# Patient Record
Sex: MALE | Race: WHITE | Employment: OTHER | URBAN - NONMETROPOLITAN AREA
[De-identification: names, ages, dates, MRNs, and addresses within clinical notes are randomized per-mention and may not be internally consistent; named-entity substitution may affect disease eponyms.]

---

## 2019-09-12 ENCOUNTER — APPOINTMENT (OUTPATIENT)
Dept: CT IMAGING | Age: 49
End: 2019-09-12
Payer: COMMERCIAL

## 2019-09-12 ENCOUNTER — APPOINTMENT (OUTPATIENT)
Dept: GENERAL RADIOLOGY | Age: 49
End: 2019-09-12
Payer: COMMERCIAL

## 2019-09-12 ENCOUNTER — HOSPITAL ENCOUNTER (EMERGENCY)
Age: 49
Discharge: ANOTHER HOSPITAL | End: 2019-09-13
Attending: EMERGENCY MEDICINE
Payer: COMMERCIAL

## 2019-09-12 DIAGNOSIS — K92.2 GASTROINTESTINAL HEMORRHAGE, UNSPECIFIED GASTROINTESTINAL HEMORRHAGE TYPE: Primary | ICD-10-CM

## 2019-09-12 LAB
-: ABNORMAL
ACETAMINOPHEN LEVEL: <5 UG/ML (ref 10–30)
ALBUMIN SERPL-MCNC: 4 G/DL (ref 3.5–5.2)
ALBUMIN/GLOBULIN RATIO: 1.2 (ref 1–2.5)
ALP BLD-CCNC: 89 U/L (ref 40–129)
ALT SERPL-CCNC: 122 U/L (ref 5–41)
AMORPHOUS: ABNORMAL
AMPHETAMINE SCREEN URINE: NEGATIVE
ANION GAP SERPL CALCULATED.3IONS-SCNC: 18 MMOL/L (ref 9–17)
AST SERPL-CCNC: 187 U/L
BACTERIA: ABNORMAL
BARBITURATE SCREEN URINE: NEGATIVE
BENZODIAZEPINE SCREEN, URINE: NEGATIVE
BILIRUB SERPL-MCNC: 1.48 MG/DL (ref 0.3–1.2)
BILIRUBIN URINE: ABNORMAL
BUN BLDV-MCNC: 7 MG/DL (ref 6–20)
BUN/CREAT BLD: 13 (ref 9–20)
BUPRENORPHINE URINE: NEGATIVE
CALCIUM SERPL-MCNC: 9.6 MG/DL (ref 8.6–10.4)
CANNABINOID SCREEN URINE: POSITIVE
CASTS UA: ABNORMAL /LPF
CHLORIDE BLD-SCNC: 87 MMOL/L (ref 98–107)
CO2: 27 MMOL/L (ref 20–31)
COCAINE METABOLITE, URINE: NEGATIVE
COLOR: YELLOW
COMMENT UA: ABNORMAL
CREAT SERPL-MCNC: 0.52 MG/DL (ref 0.7–1.2)
CRYSTALS, UA: ABNORMAL /HPF
DIRECT EXAM: POSITIVE
EPITHELIAL CELLS UA: ABNORMAL /HPF (ref 0–5)
ETHANOL PERCENT: <0.01 %
ETHANOL: <10 MG/DL
GFR AFRICAN AMERICAN: >60 ML/MIN
GFR NON-AFRICAN AMERICAN: >60 ML/MIN
GFR SERPL CREATININE-BSD FRML MDRD: ABNORMAL ML/MIN/{1.73_M2}
GFR SERPL CREATININE-BSD FRML MDRD: ABNORMAL ML/MIN/{1.73_M2}
GLUCOSE BLD-MCNC: 75 MG/DL (ref 70–99)
GLUCOSE URINE: ABNORMAL
HCT VFR BLD CALC: 39 % (ref 40.7–50.3)
HEMOGLOBIN: 13.6 G/DL (ref 13–17)
INR BLD: 1 (ref 0.9–1.2)
KETONES, URINE: ABNORMAL
LEUKOCYTE ESTERASE, URINE: NEGATIVE
Lab: ABNORMAL
MAGNESIUM: 1.9 MG/DL (ref 1.6–2.6)
MCH RBC QN AUTO: 34.1 PG (ref 25.2–33.5)
MCHC RBC AUTO-ENTMCNC: 34.9 G/DL (ref 28.4–34.8)
MCV RBC AUTO: 97.7 FL (ref 82.6–102.9)
MDMA URINE: ABNORMAL
METHADONE SCREEN, URINE: NEGATIVE
METHAMPHETAMINE, URINE: NEGATIVE
MUCUS: ABNORMAL
NITRITE, URINE: NEGATIVE
NRBC AUTOMATED: 0 PER 100 WBC
OPIATES, URINE: NEGATIVE
OTHER OBSERVATIONS UA: ABNORMAL
OXYCODONE SCREEN URINE: NEGATIVE
PDW BLD-RTO: 12.6 % (ref 11.8–14.4)
PH UA: 7 (ref 5–9)
PHENCYCLIDINE, URINE: NEGATIVE
PLATELET # BLD: ABNORMAL K/UL (ref 138–453)
PLATELET, FLUORESCENCE: 83 K/UL (ref 138–453)
PLATELET, IMMATURE FRACTION: 10.7 % (ref 1.1–10.3)
PMV BLD AUTO: ABNORMAL FL (ref 8.1–13.5)
POTASSIUM SERPL-SCNC: 3.6 MMOL/L (ref 3.7–5.3)
PROPOXYPHENE, URINE: NEGATIVE
PROTEIN UA: ABNORMAL
PROTHROMBIN TIME: 10 SEC (ref 9.7–12.2)
RBC # BLD: 3.99 M/UL (ref 4.21–5.77)
RBC UA: ABNORMAL /HPF (ref 0–2)
RENAL EPITHELIAL, UA: ABNORMAL /HPF
SALICYLATE LEVEL: <1 MG/DL (ref 3–10)
SODIUM BLD-SCNC: 132 MMOL/L (ref 135–144)
SPECIFIC GRAVITY UA: 1.01 (ref 1.01–1.02)
SPECIMEN DESCRIPTION: ABNORMAL
TEST INFORMATION: ABNORMAL
TOTAL PROTEIN: 7.4 G/DL (ref 6.4–8.3)
TRICHOMONAS: ABNORMAL
TRICYCLIC ANTIDEPRESSANTS, UR: NEGATIVE
TROPONIN INTERP: NORMAL
TROPONIN T: <0.03 NG/ML
TROPONIN, HIGH SENSITIVITY: NORMAL NG/L (ref 0–22)
TURBIDITY: CLEAR
URINE HGB: ABNORMAL
UROBILINOGEN, URINE: ABNORMAL
WBC # BLD: 5.6 K/UL (ref 3.5–11.3)
WBC UA: ABNORMAL /HPF (ref 0–5)
YEAST: ABNORMAL

## 2019-09-12 PROCEDURE — G0480 DRUG TEST DEF 1-7 CLASSES: HCPCS

## 2019-09-12 PROCEDURE — 82271 OCCULT BLOOD OTHER SOURCES: CPT

## 2019-09-12 PROCEDURE — 99285 EMERGENCY DEPT VISIT HI MDM: CPT

## 2019-09-12 PROCEDURE — C9113 INJ PANTOPRAZOLE SODIUM, VIA: HCPCS | Performed by: PHYSICIAN ASSISTANT

## 2019-09-12 PROCEDURE — 70450 CT HEAD/BRAIN W/O DYE: CPT

## 2019-09-12 PROCEDURE — 71046 X-RAY EXAM CHEST 2 VIEWS: CPT

## 2019-09-12 PROCEDURE — 83735 ASSAY OF MAGNESIUM: CPT

## 2019-09-12 PROCEDURE — 81001 URINALYSIS AUTO W/SCOPE: CPT

## 2019-09-12 PROCEDURE — 85610 PROTHROMBIN TIME: CPT

## 2019-09-12 PROCEDURE — 6360000002 HC RX W HCPCS: Performed by: PHYSICIAN ASSISTANT

## 2019-09-12 PROCEDURE — 2580000003 HC RX 258: Performed by: PHYSICIAN ASSISTANT

## 2019-09-12 PROCEDURE — 84484 ASSAY OF TROPONIN QUANT: CPT

## 2019-09-12 PROCEDURE — 85055 RETICULATED PLATELET ASSAY: CPT

## 2019-09-12 PROCEDURE — 85027 COMPLETE CBC AUTOMATED: CPT

## 2019-09-12 PROCEDURE — 72125 CT NECK SPINE W/O DYE: CPT

## 2019-09-12 PROCEDURE — 74177 CT ABD & PELVIS W/CONTRAST: CPT

## 2019-09-12 PROCEDURE — 6360000004 HC RX CONTRAST MEDICATION: Performed by: PHYSICIAN ASSISTANT

## 2019-09-12 PROCEDURE — 36415 COLL VENOUS BLD VENIPUNCTURE: CPT

## 2019-09-12 PROCEDURE — 80307 DRUG TEST PRSMV CHEM ANLYZR: CPT

## 2019-09-12 PROCEDURE — 96375 TX/PRO/DX INJ NEW DRUG ADDON: CPT

## 2019-09-12 PROCEDURE — 82140 ASSAY OF AMMONIA: CPT

## 2019-09-12 PROCEDURE — 80306 DRUG TEST PRSMV INSTRMNT: CPT

## 2019-09-12 PROCEDURE — 96365 THER/PROPH/DIAG IV INF INIT: CPT

## 2019-09-12 PROCEDURE — 80053 COMPREHEN METABOLIC PANEL: CPT

## 2019-09-12 PROCEDURE — 93005 ELECTROCARDIOGRAM TRACING: CPT | Performed by: PHYSICIAN ASSISTANT

## 2019-09-12 RX ORDER — 0.9 % SODIUM CHLORIDE 0.9 %
1000 INTRAVENOUS SOLUTION INTRAVENOUS ONCE
Status: COMPLETED | OUTPATIENT
Start: 2019-09-12 | End: 2019-09-12

## 2019-09-12 RX ORDER — OCTREOTIDE ACETATE 50 UG/ML
50 INJECTION, SOLUTION INTRAVENOUS; SUBCUTANEOUS ONCE
Status: COMPLETED | OUTPATIENT
Start: 2019-09-12 | End: 2019-09-12

## 2019-09-12 RX ORDER — ONDANSETRON 2 MG/ML
4 INJECTION INTRAMUSCULAR; INTRAVENOUS ONCE
Status: COMPLETED | OUTPATIENT
Start: 2019-09-12 | End: 2019-09-12

## 2019-09-12 RX ADMIN — OCTREOTIDE ACETATE 50 MCG: 50 INJECTION, SOLUTION INTRAVENOUS; SUBCUTANEOUS at 19:59

## 2019-09-12 RX ADMIN — IOPAMIDOL 75 ML: 755 INJECTION, SOLUTION INTRAVENOUS at 19:18

## 2019-09-12 RX ADMIN — SODIUM CHLORIDE 80 MG: 9 INJECTION, SOLUTION INTRAVENOUS at 20:04

## 2019-09-12 RX ADMIN — ONDANSETRON 4 MG: 2 INJECTION INTRAMUSCULAR; INTRAVENOUS at 19:03

## 2019-09-12 RX ADMIN — SODIUM CHLORIDE 8 MG/HR: 9 INJECTION, SOLUTION INTRAVENOUS at 20:44

## 2019-09-12 RX ADMIN — SODIUM CHLORIDE 1000 ML: 9 INJECTION, SOLUTION INTRAVENOUS at 19:28

## 2019-09-12 ASSESSMENT — ENCOUNTER SYMPTOMS
RHINORRHEA: 0
VOMITING: 0
NAUSEA: 0
EYE REDNESS: 0
WHEEZING: 0
ABDOMINAL PAIN: 0
EYE DISCHARGE: 0
SORE THROAT: 0
CONSTIPATION: 0
BLOOD IN STOOL: 0
COUGH: 1
CHEST TIGHTNESS: 0
SHORTNESS OF BREATH: 0
DIARRHEA: 0
BACK PAIN: 0

## 2019-09-12 ASSESSMENT — PAIN DESCRIPTION - DESCRIPTORS: DESCRIPTORS: SHARP

## 2019-09-12 ASSESSMENT — PAIN SCALES - GENERAL: PAINLEVEL_OUTOF10: 10

## 2019-09-12 ASSESSMENT — PAIN DESCRIPTION - PAIN TYPE: TYPE: ACUTE PAIN

## 2019-09-12 ASSESSMENT — PAIN DESCRIPTION - ORIENTATION: ORIENTATION: LEFT

## 2019-09-13 ENCOUNTER — HOSPITAL ENCOUNTER (INPATIENT)
Age: 49
LOS: 4 days | Discharge: PSYCHIATRIC OR SUBSTANCE USE FACILITY | DRG: 253 | End: 2019-09-17
Attending: INTERNAL MEDICINE | Admitting: INTERNAL MEDICINE
Payer: COMMERCIAL

## 2019-09-13 ENCOUNTER — APPOINTMENT (OUTPATIENT)
Dept: ULTRASOUND IMAGING | Age: 49
DRG: 253 | End: 2019-09-13
Attending: INTERNAL MEDICINE
Payer: COMMERCIAL

## 2019-09-13 VITALS
WEIGHT: 190 LBS | HEART RATE: 78 BPM | SYSTOLIC BLOOD PRESSURE: 157 MMHG | HEIGHT: 72 IN | BODY MASS INDEX: 25.73 KG/M2 | DIASTOLIC BLOOD PRESSURE: 92 MMHG | OXYGEN SATURATION: 98 % | TEMPERATURE: 98.5 F | RESPIRATION RATE: 20 BRPM

## 2019-09-13 PROBLEM — I47.29 NSVT (NONSUSTAINED VENTRICULAR TACHYCARDIA): Status: ACTIVE | Noted: 2019-09-13

## 2019-09-13 PROBLEM — R45.851 SUICIDAL IDEATION: Status: ACTIVE | Noted: 2019-09-13

## 2019-09-13 PROBLEM — G40.909 SEIZURE DISORDER (HCC): Status: ACTIVE | Noted: 2019-09-13

## 2019-09-13 LAB
AFP: 3.1 UG/L
CHOLESTEROL/HDL RATIO: 1.6
CHOLESTEROL: 166 MG/DL
EKG ATRIAL RATE: 95 BPM
EKG P AXIS: 55 DEGREES
EKG P-R INTERVAL: 132 MS
EKG Q-T INTERVAL: 380 MS
EKG QRS DURATION: 84 MS
EKG QTC CALCULATION (BAZETT): 477 MS
EKG R AXIS: 16 DEGREES
EKG T AXIS: 51 DEGREES
EKG VENTRICULAR RATE: 95 BPM
HAV IGM SER IA-ACNC: NONREACTIVE
HDLC SERPL-MCNC: 106 MG/DL
HEMOGLOBIN: 13.1 G/DL (ref 13–17)
HEMOGLOBIN: 13.5 G/DL (ref 13–17)
HEMOGLOBIN: 13.6 G/DL (ref 13–17)
HEPATITIS B CORE IGM ANTIBODY: NONREACTIVE
HEPATITIS B SURFACE ANTIGEN: NONREACTIVE
HEPATITIS C ANTIBODY: NONREACTIVE
LDL CHOLESTEROL: 43 MG/DL (ref 0–130)
MAGNESIUM: 2.1 MG/DL (ref 1.6–2.6)
TRIGL SERPL-MCNC: 84 MG/DL
VALPROIC ACID LEVEL: <3 UG/ML (ref 50–125)
VALPROIC DATE LAST DOSE: ABNORMAL
VALPROIC DOSE AMOUNT: ABNORMAL
VALPROIC TIME LAST DOSE: ABNORMAL
VLDLC SERPL CALC-MCNC: NORMAL MG/DL (ref 1–30)

## 2019-09-13 PROCEDURE — 85018 HEMOGLOBIN: CPT

## 2019-09-13 PROCEDURE — 90792 PSYCH DIAG EVAL W/MED SRVCS: CPT | Performed by: NURSE PRACTITIONER

## 2019-09-13 PROCEDURE — 80164 ASSAY DIPROPYLACETIC ACD TOT: CPT

## 2019-09-13 PROCEDURE — 2580000003 HC RX 258: Performed by: NURSE PRACTITIONER

## 2019-09-13 PROCEDURE — 93005 ELECTROCARDIOGRAM TRACING: CPT | Performed by: INTERNAL MEDICINE

## 2019-09-13 PROCEDURE — 86255 FLUORESCENT ANTIBODY SCREEN: CPT

## 2019-09-13 PROCEDURE — 36415 COLL VENOUS BLD VENIPUNCTURE: CPT

## 2019-09-13 PROCEDURE — 86256 FLUORESCENT ANTIBODY TITER: CPT

## 2019-09-13 PROCEDURE — C9113 INJ PANTOPRAZOLE SODIUM, VIA: HCPCS | Performed by: NURSE PRACTITIONER

## 2019-09-13 PROCEDURE — 99223 1ST HOSP IP/OBS HIGH 75: CPT | Performed by: INTERNAL MEDICINE

## 2019-09-13 PROCEDURE — 6370000000 HC RX 637 (ALT 250 FOR IP): Performed by: NURSE PRACTITIONER

## 2019-09-13 PROCEDURE — 2060000000 HC ICU INTERMEDIATE R&B

## 2019-09-13 PROCEDURE — 6370000000 HC RX 637 (ALT 250 FOR IP): Performed by: INTERNAL MEDICINE

## 2019-09-13 PROCEDURE — 2580000003 HC RX 258: Performed by: INTERNAL MEDICINE

## 2019-09-13 PROCEDURE — 6360000002 HC RX W HCPCS: Performed by: NURSE PRACTITIONER

## 2019-09-13 PROCEDURE — 99223 1ST HOSP IP/OBS HIGH 75: CPT | Performed by: PSYCHIATRY & NEUROLOGY

## 2019-09-13 PROCEDURE — 80074 ACUTE HEPATITIS PANEL: CPT

## 2019-09-13 PROCEDURE — 83735 ASSAY OF MAGNESIUM: CPT

## 2019-09-13 PROCEDURE — 82105 ALPHA-FETOPROTEIN SERUM: CPT

## 2019-09-13 PROCEDURE — 95816 EEG AWAKE AND DROWSY: CPT

## 2019-09-13 PROCEDURE — 86038 ANTINUCLEAR ANTIBODIES: CPT

## 2019-09-13 PROCEDURE — 80061 LIPID PANEL: CPT

## 2019-09-13 PROCEDURE — 93010 ELECTROCARDIOGRAM REPORT: CPT | Performed by: INTERNAL MEDICINE

## 2019-09-13 PROCEDURE — 86376 MICROSOMAL ANTIBODY EACH: CPT

## 2019-09-13 PROCEDURE — 83516 IMMUNOASSAY NONANTIBODY: CPT

## 2019-09-13 PROCEDURE — 99254 IP/OBS CNSLTJ NEW/EST MOD 60: CPT | Performed by: NURSE PRACTITIONER

## 2019-09-13 PROCEDURE — 76705 ECHO EXAM OF ABDOMEN: CPT

## 2019-09-13 RX ORDER — LORAZEPAM 1 MG/1
1 TABLET ORAL
Status: DISCONTINUED | OUTPATIENT
Start: 2019-09-13 | End: 2019-09-17 | Stop reason: HOSPADM

## 2019-09-13 RX ORDER — FOLIC ACID 1 MG/1
1 TABLET ORAL DAILY
Status: DISCONTINUED | OUTPATIENT
Start: 2019-09-13 | End: 2019-09-17 | Stop reason: HOSPADM

## 2019-09-13 RX ORDER — HYDROCODONE BITARTRATE AND ACETAMINOPHEN 5; 325 MG/1; MG/1
2 TABLET ORAL EVERY 4 HOURS PRN
Status: DISCONTINUED | OUTPATIENT
Start: 2019-09-13 | End: 2019-09-17 | Stop reason: HOSPADM

## 2019-09-13 RX ORDER — ACETAMINOPHEN 325 MG/1
650 TABLET ORAL EVERY 4 HOURS PRN
Status: DISCONTINUED | OUTPATIENT
Start: 2019-09-13 | End: 2019-09-17 | Stop reason: HOSPADM

## 2019-09-13 RX ORDER — 0.9 % SODIUM CHLORIDE 0.9 %
10 VIAL (ML) INJECTION 2 TIMES DAILY
Status: DISCONTINUED | OUTPATIENT
Start: 2019-09-13 | End: 2019-09-17

## 2019-09-13 RX ORDER — LORAZEPAM 2 MG/ML
4 INJECTION INTRAMUSCULAR
Status: DISCONTINUED | OUTPATIENT
Start: 2019-09-13 | End: 2019-09-13 | Stop reason: SDUPTHER

## 2019-09-13 RX ORDER — THIAMINE MONONITRATE (VIT B1) 100 MG
100 TABLET ORAL DAILY
Status: DISCONTINUED | OUTPATIENT
Start: 2019-09-13 | End: 2019-09-17 | Stop reason: HOSPADM

## 2019-09-13 RX ORDER — ONDANSETRON 2 MG/ML
4 INJECTION INTRAMUSCULAR; INTRAVENOUS EVERY 6 HOURS PRN
Status: DISCONTINUED | OUTPATIENT
Start: 2019-09-13 | End: 2019-09-17 | Stop reason: HOSPADM

## 2019-09-13 RX ORDER — LORAZEPAM 2 MG/ML
1 INJECTION INTRAMUSCULAR
Status: DISCONTINUED | OUTPATIENT
Start: 2019-09-13 | End: 2019-09-17 | Stop reason: HOSPADM

## 2019-09-13 RX ORDER — LORAZEPAM 2 MG/ML
1 INJECTION INTRAMUSCULAR
Status: DISCONTINUED | OUTPATIENT
Start: 2019-09-13 | End: 2019-09-13 | Stop reason: SDUPTHER

## 2019-09-13 RX ORDER — SODIUM CHLORIDE 0.9 % (FLUSH) 0.9 %
10 SYRINGE (ML) INJECTION EVERY 12 HOURS SCHEDULED
Status: DISCONTINUED | OUTPATIENT
Start: 2019-09-13 | End: 2019-09-13 | Stop reason: SDUPTHER

## 2019-09-13 RX ORDER — SODIUM CHLORIDE 0.9 % (FLUSH) 0.9 %
10 SYRINGE (ML) INJECTION PRN
Status: DISCONTINUED | OUTPATIENT
Start: 2019-09-13 | End: 2019-09-13 | Stop reason: SDUPTHER

## 2019-09-13 RX ORDER — SODIUM CHLORIDE 0.9 % (FLUSH) 0.9 %
10 SYRINGE (ML) INJECTION PRN
Status: DISCONTINUED | OUTPATIENT
Start: 2019-09-13 | End: 2019-09-17 | Stop reason: HOSPADM

## 2019-09-13 RX ORDER — BUSPIRONE HYDROCHLORIDE 5 MG/1
5 TABLET ORAL DAILY
Status: ON HOLD | COMMUNITY
Start: 2019-08-15 | End: 2019-09-16 | Stop reason: HOSPADM

## 2019-09-13 RX ORDER — LORAZEPAM 1 MG/1
2 TABLET ORAL
Status: DISCONTINUED | OUTPATIENT
Start: 2019-09-13 | End: 2019-09-17 | Stop reason: HOSPADM

## 2019-09-13 RX ORDER — PANTOPRAZOLE SODIUM 40 MG/10ML
40 INJECTION, POWDER, LYOPHILIZED, FOR SOLUTION INTRAVENOUS 2 TIMES DAILY
Status: DISCONTINUED | OUTPATIENT
Start: 2019-09-13 | End: 2019-09-17

## 2019-09-13 RX ORDER — PANTOPRAZOLE SODIUM 40 MG/10ML
40 INJECTION, POWDER, LYOPHILIZED, FOR SOLUTION INTRAVENOUS DAILY
Status: DISCONTINUED | OUTPATIENT
Start: 2019-09-16 | End: 2019-09-13

## 2019-09-13 RX ORDER — MULTIVITAMIN WITH FOLIC ACID 400 MCG
1 TABLET ORAL DAILY
Status: DISCONTINUED | OUTPATIENT
Start: 2019-09-13 | End: 2019-09-17 | Stop reason: HOSPADM

## 2019-09-13 RX ORDER — LORAZEPAM 1 MG/1
4 TABLET ORAL
Status: DISCONTINUED | OUTPATIENT
Start: 2019-09-13 | End: 2019-09-13 | Stop reason: SDUPTHER

## 2019-09-13 RX ORDER — LORAZEPAM 1 MG/1
3 TABLET ORAL
Status: DISCONTINUED | OUTPATIENT
Start: 2019-09-13 | End: 2019-09-17 | Stop reason: HOSPADM

## 2019-09-13 RX ORDER — SODIUM CHLORIDE 9 MG/ML
INJECTION, SOLUTION INTRAVENOUS CONTINUOUS
Status: DISCONTINUED | OUTPATIENT
Start: 2019-09-13 | End: 2019-09-13

## 2019-09-13 RX ORDER — HYDROCODONE BITARTRATE AND ACETAMINOPHEN 5; 325 MG/1; MG/1
1 TABLET ORAL EVERY 4 HOURS PRN
Status: DISCONTINUED | OUTPATIENT
Start: 2019-09-13 | End: 2019-09-17 | Stop reason: HOSPADM

## 2019-09-13 RX ORDER — LORAZEPAM 1 MG/1
1 TABLET ORAL
Status: DISCONTINUED | OUTPATIENT
Start: 2019-09-13 | End: 2019-09-13 | Stop reason: SDUPTHER

## 2019-09-13 RX ORDER — SODIUM CHLORIDE 0.9 % (FLUSH) 0.9 %
10 SYRINGE (ML) INJECTION EVERY 12 HOURS SCHEDULED
Status: DISCONTINUED | OUTPATIENT
Start: 2019-09-13 | End: 2019-09-17 | Stop reason: HOSPADM

## 2019-09-13 RX ORDER — LORAZEPAM 1 MG/1
2 TABLET ORAL
Status: DISCONTINUED | OUTPATIENT
Start: 2019-09-13 | End: 2019-09-13 | Stop reason: SDUPTHER

## 2019-09-13 RX ORDER — LORAZEPAM 2 MG/ML
2 INJECTION INTRAMUSCULAR
Status: DISCONTINUED | OUTPATIENT
Start: 2019-09-13 | End: 2019-09-17 | Stop reason: HOSPADM

## 2019-09-13 RX ORDER — LORAZEPAM 2 MG/ML
3 INJECTION INTRAMUSCULAR
Status: DISCONTINUED | OUTPATIENT
Start: 2019-09-13 | End: 2019-09-17 | Stop reason: HOSPADM

## 2019-09-13 RX ORDER — 0.9 % SODIUM CHLORIDE 0.9 %
10 VIAL (ML) INJECTION DAILY
Status: DISCONTINUED | OUTPATIENT
Start: 2019-09-16 | End: 2019-09-13

## 2019-09-13 RX ORDER — LORAZEPAM 2 MG/ML
3 INJECTION INTRAMUSCULAR
Status: DISCONTINUED | OUTPATIENT
Start: 2019-09-13 | End: 2019-09-13 | Stop reason: SDUPTHER

## 2019-09-13 RX ORDER — MORPHINE SULFATE 2 MG/ML
2 INJECTION, SOLUTION INTRAMUSCULAR; INTRAVENOUS
Status: DISCONTINUED | OUTPATIENT
Start: 2019-09-13 | End: 2019-09-17 | Stop reason: HOSPADM

## 2019-09-13 RX ORDER — LORAZEPAM 2 MG/ML
4 INJECTION INTRAMUSCULAR
Status: DISCONTINUED | OUTPATIENT
Start: 2019-09-13 | End: 2019-09-17 | Stop reason: HOSPADM

## 2019-09-13 RX ORDER — LORAZEPAM 1 MG/1
3 TABLET ORAL
Status: DISCONTINUED | OUTPATIENT
Start: 2019-09-13 | End: 2019-09-13 | Stop reason: SDUPTHER

## 2019-09-13 RX ORDER — NICOTINE 21 MG/24HR
1 PATCH, TRANSDERMAL 24 HOURS TRANSDERMAL DAILY
Status: DISCONTINUED | OUTPATIENT
Start: 2019-09-13 | End: 2019-09-17 | Stop reason: HOSPADM

## 2019-09-13 RX ORDER — LORAZEPAM 1 MG/1
4 TABLET ORAL
Status: DISCONTINUED | OUTPATIENT
Start: 2019-09-13 | End: 2019-09-17 | Stop reason: HOSPADM

## 2019-09-13 RX ORDER — LORAZEPAM 2 MG/ML
2 INJECTION INTRAMUSCULAR
Status: DISCONTINUED | OUTPATIENT
Start: 2019-09-13 | End: 2019-09-13 | Stop reason: SDUPTHER

## 2019-09-13 RX ORDER — MORPHINE SULFATE 4 MG/ML
4 INJECTION, SOLUTION INTRAMUSCULAR; INTRAVENOUS
Status: DISCONTINUED | OUTPATIENT
Start: 2019-09-13 | End: 2019-09-17 | Stop reason: HOSPADM

## 2019-09-13 RX ADMIN — Medication 10 ML: at 20:39

## 2019-09-13 RX ADMIN — ACETAMINOPHEN 650 MG: 325 TABLET ORAL at 03:13

## 2019-09-13 RX ADMIN — THERA TABS 1 TABLET: TAB at 08:55

## 2019-09-13 RX ADMIN — FOLIC ACID 1 MG: 1 TABLET ORAL at 08:55

## 2019-09-13 RX ADMIN — PANTOPRAZOLE SODIUM 40 MG: 40 INJECTION, POWDER, FOR SOLUTION INTRAVENOUS at 19:46

## 2019-09-13 RX ADMIN — SODIUM CHLORIDE: 9 INJECTION, SOLUTION INTRAVENOUS at 03:11

## 2019-09-13 RX ADMIN — SODIUM CHLORIDE 10 ML: 9 INJECTION, SOLUTION INTRAMUSCULAR; INTRAVENOUS; SUBCUTANEOUS at 19:46

## 2019-09-13 RX ADMIN — Medication 100 MG: at 08:55

## 2019-09-13 RX ADMIN — Medication 10 ML: at 19:48

## 2019-09-13 RX ADMIN — SODIUM CHLORIDE 8 MG/HR: 9 INJECTION, SOLUTION INTRAVENOUS at 04:39

## 2019-09-13 RX ADMIN — SODIUM CHLORIDE 80 MG: 9 INJECTION, SOLUTION INTRAVENOUS at 04:04

## 2019-09-13 RX ADMIN — SODIUM CHLORIDE, PRESERVATIVE FREE 10 ML: 5 INJECTION INTRAVENOUS at 08:56

## 2019-09-13 ASSESSMENT — PAIN SCALES - GENERAL
PAINLEVEL_OUTOF10: 8
PAINLEVEL_OUTOF10: 10

## 2019-09-13 ASSESSMENT — PAIN - FUNCTIONAL ASSESSMENT: PAIN_FUNCTIONAL_ASSESSMENT: 0-10

## 2019-09-13 ASSESSMENT — PAIN DESCRIPTION - DESCRIPTORS: DESCRIPTORS: ACHING

## 2019-09-13 NOTE — H&P
477 ms    P Axis 55 degrees    R Axis 16 degrees    T Axis 51 degrees   CBC    Collection Time: 09/12/19  6:30 PM   Result Value Ref Range    WBC 5.6 3.5 - 11.3 k/uL    RBC 3.99 (L) 4.21 - 5.77 m/uL    Hemoglobin 13.6 13.0 - 17.0 g/dL    Hematocrit 39.0 (L) 40.7 - 50.3 %    MCV 97.7 82.6 - 102.9 fL    MCH 34.1 (H) 25.2 - 33.5 pg    MCHC 34.9 (H) 28.4 - 34.8 g/dL    RDW 12.6 11.8 - 14.4 %    Platelets See Reflexed IPF Result 138 - 453 k/uL    MPV NOT REPORTED 8.1 - 13.5 fL    NRBC Automated 0.0 0.0 per 100 WBC   Comprehensive Metabolic Panel    Collection Time: 09/12/19  6:30 PM   Result Value Ref Range    Glucose 75 70 - 99 mg/dL    BUN 7 6 - 20 mg/dL    CREATININE 0.52 (L) 0.70 - 1.20 mg/dL    Bun/Cre Ratio 13 9 - 20    Calcium 9.6 8.6 - 10.4 mg/dL    Sodium 132 (L) 135 - 144 mmol/L    Potassium 3.6 (L) 3.7 - 5.3 mmol/L    Chloride 87 (L) 98 - 107 mmol/L    CO2 27 20 - 31 mmol/L    Anion Gap 18 (H) 9 - 17 mmol/L    Alkaline Phosphatase 89 40 - 129 U/L     (H) 5 - 41 U/L     (H) <40 U/L    Total Bilirubin 1.48 (H) 0.3 - 1.2 mg/dL    Total Protein 7.4 6.4 - 8.3 g/dL    Alb 4.0 3.5 - 5.2 g/dL    Albumin/Globulin Ratio 1.2 1.0 - 2.5    GFR Non-African American >60 >60 mL/min    GFR African American >60 >60 mL/min    GFR Comment          GFR Staging         Salicylate    Collection Time: 09/12/19  6:30 PM   Result Value Ref Range    Salicylate Lvl <1 (L) 3 - 10 mg/dL   Acetaminophen level    Collection Time: 09/12/19  6:30 PM   Result Value Ref Range    Acetaminophen Level <5 (L) 10 - 30 ug/mL   Magnesium    Collection Time: 09/12/19  6:30 PM   Result Value Ref Range    Magnesium 1.9 1.6 - 2.6 mg/dL   Troponin    Collection Time: 09/12/19  6:30 PM   Result Value Ref Range    Troponin, High Sensitivity NOT REPORTED 0 - 22 ng/L    Troponin T <0.03 <0.03 ng/mL    Troponin Interp         Ethanol    Collection Time: 09/12/19  6:30 PM   Result Value Ref Range    Ethanol <10 <10 mg/dL    Ethanol percent <0.010

## 2019-09-13 NOTE — CONSULTS
home, indicates that his energy level recently has been down and states he does not have any interest in any activities \"because of my eyesight. \"  He also endorses some difficulty concentrating at times. He mentions that his daughter lives in [facility] and therefore he has limited contact, and this is a stressor. Throughout the interview, the patient appears to have significant psychomotor retardation, with monotone speech. When asked specifically about his current psychiatric medications, patient states he generally takes them, but he has previously reported history of noncompliance. Notably, he does not see a psychiatrist.  He sees a counselor in the office of his PCP, who makes recommendations for psychiatric medications to his physician. Patient is not currently taking an antidepressant and reportedly has a history of bipolar disorder. However, he is unable to describe any past manic symptoms. He does state that in the past he heard voices, but none recently. He is prescribed Depakote for seizures, that is an effective mood stabilizer for bipolar symptoms, however he is not always compliant. Current Outpatient Psychiatric Medications:  Haldol 2 mg at bedtime, Cogentin 2 mg at bedtime, BuSpar 5 mg daily.     Medications:    Current Facility-Administered Medications: folic acid (FOLVITE) tablet 1 mg, 1 mg, Oral, Daily  multivitamin 1 tablet, 1 tablet, Oral, Daily  vitamin B-1 (THIAMINE) tablet 100 mg, 100 mg, Oral, Daily  acetaminophen (TYLENOL) tablet 650 mg, 650 mg, Oral, Q4H PRN  HYDROcodone-acetaminophen (NORCO) 5-325 MG per tablet 1 tablet, 1 tablet, Oral, Q4H PRN **OR** HYDROcodone-acetaminophen (NORCO) 5-325 MG per tablet 2 tablet, 2 tablet, Oral, Q4H PRN  morphine (PF) injection 2 mg, 2 mg, Intravenous, Q2H PRN **OR** morphine injection 4 mg, 4 mg, Intravenous, Q2H PRN  ondansetron (ZOFRAN) injection 4 mg, 4 mg, Intravenous, Q6H PRN  valproate (DEPACON) 500 mg in dextrose 5 % 100 mL IVPB, 500 mg,

## 2019-09-13 NOTE — ED PROVIDER NOTES
FUMARATE-FA (PRENATAL 1 PLUS 1 PO)    Take 1 tablet by mouth daily. VITAMIN B-1 (THIAMINE) 100 MG TABLET    Take 100 mg by mouth daily. ALLERGIES     Patient has no known allergies.     FAMILY HISTORY       Family History   Problem Relation Age of Onset    High Blood Pressure Mother     Diabetes Mother           SOCIAL HISTORY       Social History     Socioeconomic History    Marital status: Single     Spouse name: None    Number of children: None    Years of education: None    Highest education level: None   Occupational History    None   Social Needs    Financial resource strain: None    Food insecurity:     Worry: None     Inability: None    Transportation needs:     Medical: None     Non-medical: None   Tobacco Use    Smoking status: Current Every Day Smoker     Packs/day: 2.00    Smokeless tobacco: Never Used   Substance and Sexual Activity    Alcohol use: Yes     Comment: 12 pack per week of beer    Drug use: Yes     Comment: Marijuana last week    Sexual activity: None   Lifestyle    Physical activity:     Days per week: None     Minutes per session: None    Stress: None   Relationships    Social connections:     Talks on phone: None     Gets together: None     Attends Temple service: None     Active member of club or organization: None     Attends meetings of clubs or organizations: None     Relationship status: None    Intimate partner violence:     Fear of current or ex partner: None     Emotionally abused: None     Physically abused: None     Forced sexual activity: None   Other Topics Concern    None   Social History Narrative    None       SCREENINGS    Naty Coma Scale  Eye Opening: Spontaneous  Best Verbal Response: Oriented  Best Motor Response: Obeys commands  Naty Coma Scale Score: 15 @FLOW([id number])@      PHYSICAL EXAM    (up to 7 for level 4, 8 or more for level 5)     ED Triage Vitals   BP Temp Temp Source Pulse Resp SpO2 Height Weight   09/12/19 1717

## 2019-09-13 NOTE — CONSULTS
with iron   Yes Historical Provider, MD   vitamin B-1 (THIAMINE) 100 MG tablet Take 100 mg by mouth daily. Yes Historical Provider, MD   artificial tears (ARTIFICIAL TEARS) OINT as needed. Yes Historical Provider, MD   albuterol (PROVENTIL HFA) 108 (90 BASE) MCG/ACT inhaler Inhale 2 puffs into the lungs every 6 hours as needed for Wheezing. Yes Historical Provider, MD   benztropine (COGENTIN) 2 MG tablet Take 2 mg by mouth nightly. Yes Historical Provider, MD   pantoprazole (PROTONIX) 40 MG tablet Take 40 mg by mouth daily. Yes Historical Provider, MD   busPIRone (BUSPAR) 5 MG tablet Take 5 mg by mouth daily 8/15/19   Historical Provider, MD   haloperidol (HALDOL) 2 MG tablet Take 2 mg by mouth nightly. Historical Provider, MD   folic acid (FOLVITE) 1 MG tablet Take 1 mg by mouth daily. Historical Provider, MD   Prenatal Vit-Fe Fumarate-FA (PRENATAL 1 PLUS 1 PO) Take 1 tablet by mouth daily. Historical Provider, MD   haloperidol (HALDOL) 1 MG tablet Take 1 mg by mouth 2 times daily. Historical Provider, MD   LORazepam (ATIVAN) 0.5 MG tablet Take 0.5 mg by mouth every 8 hours. Historical Provider, MD        Allergies:     Patient has no known allergies. Social History:     Tobacco:    reports that he has been smoking. He has been smoking about 2.00 packs per day. He has never used smokeless tobacco.  Alcohol:      reports that he drinks alcohol. Drug Use:  reports that he has current or past drug history.     Family History:     Family History   Problem Relation Age of Onset    High Blood Pressure Mother     Diabetes Mother        Review of Systems:       Constitutional Negative for fever and chills   HEENT Negative for ear discharge, ear pain, nosebleed   Eyes Negative for photophobia, pain and discharge   Respiratory Negative for hemoptysis and sputum   Cardiovascular Negative for orthopnea, claudication and PND   Gastrointestinal  positive for emesis, negative for abdominal pain, and tone. Mild action tremor in UE bilaterally                       Sensory function Intact to touch, pin, vibration, proprioception throughout     Cerebellar Intact finger-nose-finger testing. Intact heel-shin testing. No dysdiadochokinesia present. Reflex function 3/4 symmetric throughout . Downgoing plantar response bilaterally. (-)Iyer's sign bilaterally    Gait                  Normal station and gait             Diagnostics:      Laboratory Testing:  CBC:   Recent Labs     09/12/19  1830 09/13/19  0347   WBC 5.6  --    HGB 13.6 13.1   PLT See Reflexed IPF Result  --      BMP:    Recent Labs     09/12/19  1830   *   K 3.6*   CL 87*   CO2 27   BUN 7   CREATININE 0.52*   GLUCOSE 75         Lab Results   Component Value Date     (H) 09/12/2019     (H) 09/12/2019    INR 1.0 09/12/2019         Imaging/Diagnostics:      EEG: Pending      CT head - no acute process. Mild diffuse cerebral and cerebellar atrophy. CT abdomen pelvis - severe hepatic steatosis and findings compatible with portal venous hypertension     All of the above medications, clinical laboratory, imaging and other diagnostic tests were reviewed by myself. Impression:      1. Breakthrough seizure in patient with history of epilepsy, medically noncompliant. 2.  Hematemesis, possible GI bleed    3. History of alcohol abuse    4. Diffuse cerebral, cerebellar atrophy suspect secondary to history of alcohol abuse    5. History of bipolar disorder    Plan:   -Check EEG  -Restart home dose Depakote 500 mg daily  -Check valproic acid levels  -Seizure precautions  -GI work-up for GI bleed  -Recommend CIWA protocol  -Will follow       Thank you for this very interesting consultation.       Electronically signed by Corazon Mae DO on 9/13/2019 at 9:39 AM      Corazon Mae, [address] [place] [address]  Neurology

## 2019-09-13 NOTE — PROGRESS NOTES
Smoking Cessation - topics covered   []  Health Risks  []  Benefits of Quitting   []  Smoking Cessation  []  Patient has no history of tobacco use  []  Patient is former smoker. []  No need for tobacco cessation education. []  Booklet given  []  Patient verbalizes understanding. []  Patient denies need for tobacco cessation education. [x]  Unable to meet with patient today. Will follow up as able.   Alex Rued  1:26 PM

## 2019-09-14 LAB
ALBUMIN SERPL-MCNC: 3.5 G/DL (ref 3.5–5.2)
ALBUMIN/GLOBULIN RATIO: 1.1 (ref 1–2.5)
ALP BLD-CCNC: 101 U/L (ref 40–129)
ALT SERPL-CCNC: 102 U/L (ref 5–41)
ANION GAP SERPL CALCULATED.3IONS-SCNC: 15 MMOL/L (ref 9–17)
AST SERPL-CCNC: 143 U/L
BILIRUB SERPL-MCNC: 1.34 MG/DL (ref 0.3–1.2)
BILIRUBIN DIRECT: 0.35 MG/DL
BILIRUBIN, INDIRECT: 0.99 MG/DL (ref 0–1)
BUN BLDV-MCNC: 10 MG/DL (ref 6–20)
BUN/CREAT BLD: ABNORMAL (ref 9–20)
CALCIUM SERPL-MCNC: 9.1 MG/DL (ref 8.6–10.4)
CHLORIDE BLD-SCNC: 95 MMOL/L (ref 98–107)
CO2: 26 MMOL/L (ref 20–31)
CREAT SERPL-MCNC: 0.44 MG/DL (ref 0.7–1.2)
GFR AFRICAN AMERICAN: >60 ML/MIN
GFR NON-AFRICAN AMERICAN: >60 ML/MIN
GFR SERPL CREATININE-BSD FRML MDRD: ABNORMAL ML/MIN/{1.73_M2}
GFR SERPL CREATININE-BSD FRML MDRD: ABNORMAL ML/MIN/{1.73_M2}
GLOBULIN: ABNORMAL G/DL (ref 1.5–3.8)
GLUCOSE BLD-MCNC: 87 MG/DL (ref 70–99)
HCT VFR BLD CALC: 39.1 % (ref 40.7–50.3)
HEMOGLOBIN: 12.9 G/DL (ref 13–17)
HEMOGLOBIN: 13 G/DL (ref 13–17)
HEMOGLOBIN: 13.2 G/DL (ref 13–17)
HEMOGLOBIN: 13.3 G/DL (ref 13–17)
INR BLD: 1.1
INR BLD: 1.1
MCH RBC QN AUTO: 33.7 PG (ref 25.2–33.5)
MCHC RBC AUTO-ENTMCNC: 33.8 G/DL (ref 28.4–34.8)
MCV RBC AUTO: 99.7 FL (ref 82.6–102.9)
NRBC AUTOMATED: 0 PER 100 WBC
PDW BLD-RTO: 12.3 % (ref 11.8–14.4)
PLATELET # BLD: 67 K/UL (ref 138–453)
PMV BLD AUTO: 12.2 FL (ref 8.1–13.5)
POTASSIUM SERPL-SCNC: 3.6 MMOL/L (ref 3.7–5.3)
PROTHROMBIN TIME: 11.3 SEC (ref 9–12)
PROTHROMBIN TIME: 11.4 SEC (ref 9–12)
RBC # BLD: 3.92 M/UL (ref 4.21–5.77)
SODIUM BLD-SCNC: 136 MMOL/L (ref 135–144)
TOTAL PROTEIN: 6.8 G/DL (ref 6.4–8.3)
VALPROIC ACID LEVEL: 30 UG/ML (ref 50–125)
VALPROIC ACID, FREE: 5.4 UG/ML (ref 7–23)
VALPROIC DATE LAST DOSE: ABNORMAL
VALPROIC DOSE AMOUNT: ABNORMAL
VALPROIC TIME LAST DOSE: ABNORMAL
WBC # BLD: 4.2 K/UL (ref 3.5–11.3)

## 2019-09-14 PROCEDURE — 36415 COLL VENOUS BLD VENIPUNCTURE: CPT

## 2019-09-14 PROCEDURE — 6370000000 HC RX 637 (ALT 250 FOR IP): Performed by: INTERNAL MEDICINE

## 2019-09-14 PROCEDURE — 85610 PROTHROMBIN TIME: CPT

## 2019-09-14 PROCEDURE — 2500000003 HC RX 250 WO HCPCS: Performed by: INTERNAL MEDICINE

## 2019-09-14 PROCEDURE — 80048 BASIC METABOLIC PNL TOTAL CA: CPT

## 2019-09-14 PROCEDURE — 80076 HEPATIC FUNCTION PANEL: CPT

## 2019-09-14 PROCEDURE — 2060000000 HC ICU INTERMEDIATE R&B

## 2019-09-14 PROCEDURE — 85027 COMPLETE CBC AUTOMATED: CPT

## 2019-09-14 PROCEDURE — 99232 SBSQ HOSP IP/OBS MODERATE 35: CPT | Performed by: INTERNAL MEDICINE

## 2019-09-14 PROCEDURE — 85018 HEMOGLOBIN: CPT

## 2019-09-14 PROCEDURE — 80165 DIPROPYLACETIC ACID FREE: CPT

## 2019-09-14 PROCEDURE — 99233 SBSQ HOSP IP/OBS HIGH 50: CPT | Performed by: NURSE PRACTITIONER

## 2019-09-14 PROCEDURE — 2580000003 HC RX 258: Performed by: NURSE PRACTITIONER

## 2019-09-14 PROCEDURE — 2500000003 HC RX 250 WO HCPCS: Performed by: NURSE PRACTITIONER

## 2019-09-14 PROCEDURE — 2580000003 HC RX 258: Performed by: INTERNAL MEDICINE

## 2019-09-14 PROCEDURE — 95819 EEG AWAKE AND ASLEEP: CPT | Performed by: PSYCHIATRY & NEUROLOGY

## 2019-09-14 PROCEDURE — 6360000002 HC RX W HCPCS: Performed by: NURSE PRACTITIONER

## 2019-09-14 PROCEDURE — C9113 INJ PANTOPRAZOLE SODIUM, VIA: HCPCS | Performed by: NURSE PRACTITIONER

## 2019-09-14 PROCEDURE — 80164 ASSAY DIPROPYLACETIC ACD TOT: CPT

## 2019-09-14 RX ORDER — SERTRALINE HYDROCHLORIDE 25 MG/1
25 TABLET, FILM COATED ORAL DAILY
Status: DISCONTINUED | OUTPATIENT
Start: 2019-09-15 | End: 2019-09-17 | Stop reason: HOSPADM

## 2019-09-14 RX ORDER — LORAZEPAM 2 MG/ML
1 INJECTION INTRAMUSCULAR EVERY 30 MIN PRN
Status: DISCONTINUED | OUTPATIENT
Start: 2019-09-14 | End: 2019-09-17 | Stop reason: HOSPADM

## 2019-09-14 RX ADMIN — FOLIC ACID 1 MG: 1 TABLET ORAL at 08:23

## 2019-09-14 RX ADMIN — Medication 10 ML: at 23:44

## 2019-09-14 RX ADMIN — Medication 10 ML: at 20:45

## 2019-09-14 RX ADMIN — THERA TABS 1 TABLET: TAB at 10:11

## 2019-09-14 RX ADMIN — METOPROLOL TARTRATE 25 MG: 25 TABLET ORAL at 13:00

## 2019-09-14 RX ADMIN — Medication 10 ML: at 08:30

## 2019-09-14 RX ADMIN — VALPROATE SODIUM 500 MG: 100 INJECTION, SOLUTION INTRAVENOUS at 08:30

## 2019-09-14 RX ADMIN — VALPROATE SODIUM 500 MG: 100 INJECTION, SOLUTION INTRAVENOUS at 20:45

## 2019-09-14 RX ADMIN — PANTOPRAZOLE SODIUM 40 MG: 40 INJECTION, POWDER, FOR SOLUTION INTRAVENOUS at 08:30

## 2019-09-14 RX ADMIN — METOPROLOL TARTRATE 25 MG: 25 TABLET ORAL at 20:45

## 2019-09-14 RX ADMIN — PANTOPRAZOLE SODIUM 40 MG: 40 INJECTION, POWDER, FOR SOLUTION INTRAVENOUS at 23:43

## 2019-09-14 RX ADMIN — Medication 10 ML: at 09:00

## 2019-09-14 RX ADMIN — Medication 100 MG: at 08:23

## 2019-09-14 ASSESSMENT — PAIN SCALES - GENERAL: PAINLEVEL_OUTOF10: 0

## 2019-09-14 NOTE — PROGRESS NOTES
dysarthria, aphasia   Cranial nerves   II - visual fields intact to confrontation; pupils reactive  III, IV, VI - extraocular muscles intact; no JOS; no nystagmus; no ptosis   V - normal facial sensation                                                               VII - normal facial symmetry                                                             VIII - intact hearing                                                                             IX, X - symmetrical palate elevation                                               XI - symmetrical shoulder shrug                                                       XII - midline tongue without atrophy or fasciculation   Motor function  Strength: 5/5 RUE, 5/5 RLE, 5/5 LUE, 5/5  LLE  Normal bulk and tone. Mild tremors b/l hands                     Sensory function Grossly intact     Cerebellar Grossly intact   Reflex function 2/4 symmetric throughout . Downgoing plantar response bilaterally. (-)Iyer's sign bilaterally    Gait                  Not tested       DATA      Lab Results   Component Value Date    WBC 4.2 09/14/2019    HGB 13.2 09/14/2019    HCT 39.1 (L) 09/14/2019    PLT 67 (L) 09/14/2019    CHOL 166 09/13/2019    TRIG 84 09/13/2019     09/13/2019     (H) 09/12/2019     (H) 09/12/2019     09/14/2019    K 3.6 (L) 09/14/2019    CL 95 (L) 09/14/2019    CREATININE 0.44 (L) 09/14/2019    BUN 10 09/14/2019    CO2 26 09/14/2019    INR 1.1 09/14/2019 9/13/2019 12:19 9/14/2019 11:06   Valproic Acid Lvl <3 (L) 30 (L)   Valproic Acid, Free  5.4 (L)             CT HEAD (9/12/2019):  Mild diffuse cerebral & cerebellar atrophy     ECHO    EEG (9/13/2019): Normal                          IMPRESSION & PLAN: 44 y.o.  male admitted with  Breakthrough seizures with AED non-compliance; continue Depacon 500 mg IVPB Q12hr (higher dose) & repeat levels tomorrow morning; continue Ativan 1 mg IV PRN; seizure precautions    Hematemesis; severe hepatic steatosis & portal venous HTN. EGD was planned but cancelled due to episode of NSVT. EGD will be done if EF is preserved      H/O Wernicke's encephalopathy; chronic acoholism with diffuse cerebral & cerebellar atrophy; is on Thiamine, folic acid & multivitamin     Bipolar disorder, depression, anxiety with suicidal ideation; non-compliant; underwent telepsych evaluation.  Pt agreed to admit in [facility] after medically cleared    Comorbid conditions - HTN, COPD, hearing loss, cannabinoid use    Will follow

## 2019-09-14 NOTE — CONSULTS
loss. There's been No change in energy level, No change in activity level. · Eyes: No visual changes or diplopia. No scleral icterus. · ENT: No Headaches  · Cardiovascular: No cardiac history. Otherwise as noted above  · Respiratory: No previous pulmonary problems, + cough  · Gastrointestinal: + abdominal pain. No change in bowel or bladder habits. · Genitourinary: No dysuria, trouble voiding, or hematuria. · Musculoskeletal:  No gait disturbance, No weakness or joint complaints. · Integumentary: No rash or pruritis. · Neurological: No headache, diplopia, change in muscle strength, numbness or tingling. No change in gait, balance, coordination, mood, affect, memory, mentation, behavior. · Psychiatric: + anxiety, + depression. · Endocrine: No temperature intolerance. No excessive thirst, fluid intake, or urination. No tremor. · Hematologic/Lymphatic: + bleeding, no blood clots or swollen lymph nodes. PHYSICAL EXAM:      BP (!) 149/88   Pulse 69   Temp 98 °F (36.7 °C) (Oral)   Resp 15   Ht 6' (1.829 m)   Wt 182 lb 8.7 oz (82.8 kg)   SpO2 99%   BMI 24.76 kg/m²    Constitutional and General Appearance: Alert, cooperative, no distress and appears stated age  HEENT: PERRLA, no cervical lymphadenopathy. No masses palpable. Respiratory:  · Normal excursion and expansion without use of accessory muscles  · Resp Auscultation: Fair respiratory effort. No increased work of breathing. · Clear to auscultation bilaterally  Cardiovascular:  · Normal S1 and S2.   · Jugular venous pulsation Normal  · The carotid upstroke is normal in amplitude and contour without delay or bruit  · Peripheral pulses are symmetrical and full   Abdomen:   · Soft  · No tenderness  · Bowel sounds present  Extremities:  · No cyanosis or clubbing  · No lower extremity edema  · Skin: Warm and dry  Neurologic:  · Alert and oriented.   · Moves all extremities well  Psychiatric:  · No abnormalities of mood, affect, memory, mentation, or behavior are noted      DATA:    Diagnostics:    EKG: normal EKG, normal sinus rhythm, unchanged from previous tracings. ECHO: not available    Stress Test: reports has had one some years ago. No report available. Cardiac Angiography: not obtained. Labs:     CBC:   Recent Labs     09/12/19 1830 09/14/19  0041 09/14/19 0455   WBC 5.6  --   --  4.2   HGB 13.6   < > 12.9* 13.2   HCT 39.0*  --   --  39.1*   PLT See Reflexed IPF Result  --   --  67*    < > = values in this interval not displayed. BMP:   Recent Labs     09/12/19 1830 09/14/19 0455   * 136   K 3.6* 3.6*   CO2 27 26   BUN 7 10   CREATININE 0.52* 0.44*   LABGLOM >60 >60   GLUCOSE 75 87     BNP: No results for input(s): BNP in the last 72 hours. PT/INR:   Recent Labs     09/12/19 1830 09/14/19 0455   PROTIME 10.0 11.3   INR 1.0 1.1     APTT:No results for input(s): APTT in the last 72 hours. CARDIAC ENZYMES:  Recent Labs     09/12/19  1830   TROPHS NOT REPORTED     No results for input(s): CKTOTAL, CKMB, CKMBINDEX, TROPONINI in the last 72 hours. Invalid input(s):  [address]  Recent Labs     09/12/19 1830   TROPONINT <0.03     FASTING LIPID PANEL:  Lab Results   Component Value Date     09/13/2019    TRIG 84 09/13/2019     LIVER PROFILE:  Recent Labs     09/12/19 1830   *   *   LABALBU 4.0     LDL 43 (9/13/19)    IMPRESSION:    1. HTN - not on home meds, uncontrolled  2. NSVT  3. GI bleed - likely upper GI  4. Active tobacco use  5. Depression with suicidal ideation  6. Seizure disorder  7. Alcohol use disorder  8.  Possible COPD    Patient Active Problem List   Diagnosis    Wernicke-Korsakoff psychosis    Hypertension    [facility]ism ([facility]    Malnutrition ([facility]    Tobacco abuse    GI bleed    Seizure disorder ([facility]    NSVT (nonsustained ventricular tachycardia) (HCC)    Suicidal ideation    Bipolar 1 disorder, depressed, severe ([facility]    Alcohol use disorder, severe, dependence ([facility] RECOMMENDATIONS:  1. Obtain formal ECHO  2. Monitor for alcohol withdrawal  3. Replete electrolytes K >4, Mg >2  4. Will consider Cardiolite stress test (patient unable to run, has hx of ankle fracture on right with decreased range of motion). Discussed with patient and nurse.       Electronically signed on 09/14/19 at 7:14 AM by:    Cheyenne Milligan MD   Fellow, [address]

## 2019-09-14 NOTE — PROCEDURES
rhythm. Interpretation  This EEG was normal in wakefulness and sleep. Clinical correlation  This EEG was normal. No focal or epileptiform abnormalities were seen.     Wilmer Vaca MD  Diplomate, American Board of Psychiatry and Neurology  Diplomate, American Board of Clinical Neurophysiology  Diplomate, American Board of Epilepsy

## 2019-09-15 LAB
ALBUMIN SERPL-MCNC: 3.6 G/DL (ref 3.5–5.2)
ALBUMIN/GLOBULIN RATIO: 1 (ref 1–2.5)
ALP BLD-CCNC: 105 U/L (ref 40–129)
ALT SERPL-CCNC: 108 U/L (ref 5–41)
AST SERPL-CCNC: 117 U/L
BILIRUB SERPL-MCNC: 1.21 MG/DL (ref 0.3–1.2)
BILIRUBIN DIRECT: 0.4 MG/DL
BILIRUBIN, INDIRECT: 0.81 MG/DL (ref 0–1)
EKG ATRIAL RATE: 78 BPM
EKG P AXIS: 46 DEGREES
EKG P-R INTERVAL: 134 MS
EKG Q-T INTERVAL: 380 MS
EKG QRS DURATION: 96 MS
EKG QTC CALCULATION (BAZETT): 433 MS
EKG R AXIS: 19 DEGREES
EKG T AXIS: 36 DEGREES
EKG VENTRICULAR RATE: 78 BPM
GLOBULIN: ABNORMAL G/DL (ref 1.5–3.8)
HEMOGLOBIN: 13 G/DL (ref 13–17)
HEMOGLOBIN: 13.1 G/DL (ref 13–17)
HEMOGLOBIN: 13.6 G/DL (ref 13–17)
HEMOGLOBIN: 13.9 G/DL (ref 13–17)
INR BLD: 1
LIVER-KIDNEY MICROSOMAL AB: NORMAL
PROTHROMBIN TIME: 10.9 SEC (ref 9–12)
TOTAL PROTEIN: 7.1 G/DL (ref 6.4–8.3)
VALPROIC ACID LEVEL: 38 UG/ML (ref 50–125)
VALPROIC ACID, FREE: 5.9 UG/ML (ref 7–23)
VALPROIC DATE LAST DOSE: ABNORMAL
VALPROIC DOSE AMOUNT: ABNORMAL
VALPROIC TIME LAST DOSE: ABNORMAL

## 2019-09-15 PROCEDURE — 80076 HEPATIC FUNCTION PANEL: CPT

## 2019-09-15 PROCEDURE — 2580000003 HC RX 258: Performed by: NURSE PRACTITIONER

## 2019-09-15 PROCEDURE — 93010 ELECTROCARDIOGRAM REPORT: CPT | Performed by: INTERNAL MEDICINE

## 2019-09-15 PROCEDURE — 36415 COLL VENOUS BLD VENIPUNCTURE: CPT

## 2019-09-15 PROCEDURE — 6370000000 HC RX 637 (ALT 250 FOR IP): Performed by: NURSE PRACTITIONER

## 2019-09-15 PROCEDURE — 99232 SBSQ HOSP IP/OBS MODERATE 35: CPT | Performed by: INTERNAL MEDICINE

## 2019-09-15 PROCEDURE — 99232 SBSQ HOSP IP/OBS MODERATE 35: CPT | Performed by: NURSE PRACTITIONER

## 2019-09-15 PROCEDURE — 6370000000 HC RX 637 (ALT 250 FOR IP): Performed by: INTERNAL MEDICINE

## 2019-09-15 PROCEDURE — 2580000003 HC RX 258: Performed by: INTERNAL MEDICINE

## 2019-09-15 PROCEDURE — 80165 DIPROPYLACETIC ACID FREE: CPT

## 2019-09-15 PROCEDURE — 2060000000 HC ICU INTERMEDIATE R&B

## 2019-09-15 PROCEDURE — 85610 PROTHROMBIN TIME: CPT

## 2019-09-15 PROCEDURE — 6360000002 HC RX W HCPCS: Performed by: INTERNAL MEDICINE

## 2019-09-15 PROCEDURE — 2500000003 HC RX 250 WO HCPCS: Performed by: NURSE PRACTITIONER

## 2019-09-15 PROCEDURE — 85018 HEMOGLOBIN: CPT

## 2019-09-15 PROCEDURE — C9113 INJ PANTOPRAZOLE SODIUM, VIA: HCPCS | Performed by: NURSE PRACTITIONER

## 2019-09-15 PROCEDURE — 6360000002 HC RX W HCPCS: Performed by: NURSE PRACTITIONER

## 2019-09-15 PROCEDURE — 80164 ASSAY DIPROPYLACETIC ACD TOT: CPT

## 2019-09-15 PROCEDURE — 6370000000 HC RX 637 (ALT 250 FOR IP): Performed by: STUDENT IN AN ORGANIZED HEALTH CARE EDUCATION/TRAINING PROGRAM

## 2019-09-15 RX ORDER — DIVALPROEX SODIUM 500 MG/1
500 TABLET, DELAYED RELEASE ORAL EVERY 12 HOURS SCHEDULED
Status: DISCONTINUED | OUTPATIENT
Start: 2019-09-15 | End: 2019-09-17 | Stop reason: HOSPADM

## 2019-09-15 RX ORDER — METOPROLOL TARTRATE 50 MG/1
50 TABLET, FILM COATED ORAL 2 TIMES DAILY
Status: DISCONTINUED | OUTPATIENT
Start: 2019-09-15 | End: 2019-09-17

## 2019-09-15 RX ADMIN — Medication 10 ML: at 10:50

## 2019-09-15 RX ADMIN — LORAZEPAM 2 MG: 2 INJECTION INTRAMUSCULAR; INTRAVENOUS at 13:17

## 2019-09-15 RX ADMIN — Medication 10 ML: at 09:00

## 2019-09-15 RX ADMIN — METOPROLOL TARTRATE 25 MG: 25 TABLET ORAL at 08:30

## 2019-09-15 RX ADMIN — FOLIC ACID 1 MG: 1 TABLET ORAL at 10:47

## 2019-09-15 RX ADMIN — METOPROLOL TARTRATE 50 MG: 25 TABLET ORAL at 22:06

## 2019-09-15 RX ADMIN — THERA TABS 1 TABLET: TAB at 10:47

## 2019-09-15 RX ADMIN — Medication 10 ML: at 22:16

## 2019-09-15 RX ADMIN — PANTOPRAZOLE SODIUM 40 MG: 40 INJECTION, POWDER, FOR SOLUTION INTRAVENOUS at 09:00

## 2019-09-15 RX ADMIN — Medication 100 MG: at 10:51

## 2019-09-15 RX ADMIN — Medication 10 ML: at 22:17

## 2019-09-15 RX ADMIN — VALPROATE SODIUM 500 MG: 100 INJECTION, SOLUTION INTRAVENOUS at 10:00

## 2019-09-15 RX ADMIN — SERTRALINE 25 MG: 25 TABLET, FILM COATED ORAL at 10:49

## 2019-09-15 RX ADMIN — DIVALPROEX SODIUM 500 MG: 500 TABLET, DELAYED RELEASE ORAL at 22:02

## 2019-09-15 RX ADMIN — PANTOPRAZOLE SODIUM 40 MG: 40 INJECTION, POWDER, FOR SOLUTION INTRAVENOUS at 22:14

## 2019-09-15 ASSESSMENT — PAIN SCALES - GENERAL
PAINLEVEL_OUTOF10: 0

## 2019-09-15 NOTE — PROGRESS NOTES
--   --   --    MPV NOT REPORTED  --  12.2  --   --   --   --    INR 1.0  --  1.1 1.1  --   --  1.0    < > = values in this interval not displayed. Chemistry:  Recent Labs     09/12/19 1830 09/13/19 0347 09/14/19 0455   *  --  136   K 3.6*  --  3.6*   CL 87*  --  95*   CO2 27  --  26   GLUCOSE 75  --  87   BUN 7  --  10   CREATININE 0.52*  --  0.44*   MG 1.9 2.1  --    ANIONGAP 18*  --  15   LABGLOM >60  --  >60   GFRAA >60  --  >60   CALCIUM 9.6  --  9.1   TROPHS NOT REPORTED  --   --      Recent Labs     09/12/19  1830 09/13/19  1838 09/14/19  0455 09/15/19  0524   PROT 7.4  --  6.8 7.1   LABALBU 4.0  --  3.5 3.6   *  --  143* 117*   *  --  102* 108*   ALKPHOS 89  --  101 105   BILITOT 1.48*  --  1.34* 1.21*   BILIDIR  --   --  0.35* 0.40*   CHOL  --  166  --   --    HDL  --  106  --   --    LDLCHOLESTEROL  --  43  --   --    CHOLHDLRATIO  --  1.6  --   --    TRIG  --  84  --   --    VLDL  --  NOT REPORTED  --   --      ABG:No results found for: POCPH, PHART, PH, POCPCO2, [id number], PCO2, POCPO2, PO2ART, PO2, POCHCO3, HXB2CZG, HCO3, NBEA, PBEA, BEART, BE, THGBART, THB, WMW3TDX, EGKY3LVW, P3OTWRBY, O2SAT, FIO2  Lab Results   Component Value Date/Time    SPECIAL NOT REPORTED 09/12/2019 07:25 PM     No results found for: CULTURE    Radiology:  Xr Chest Standard (2 Vw)    Result Date: 9/12/2019  No acute cardiopulmonary process. Findings of COPD. Ct Head Wo Contrast    Result Date: 9/12/2019  No acute intracranial abnormality. No fracture or malalignment of the cervical spine. Ct Cervical Spine Wo Contrast    Result Date: 9/12/2019  No acute intracranial abnormality. No fracture or malalignment of the cervical spine. Ct Abdomen Pelvis W Iv Contrast Additional Contrast? None    Result Date: 9/12/2019  No evidence for acute intra-abdominal or intrapelvic pathology. No bowel obstruction or inflammation. No free intraperitoneal air or fluid. No evidence for urinary obstruction. Normal appendix. Severe hepatic steatosis and findings compatible with portal venous hypertension. Us Abdomen Limited    Result Date: 9/13/2019  Coarsely increased echogenicity of the liver, finding suggesting diffuse hepatocellular disease to include at least a component of fatty infiltration.        Physical Examination:        General appearance:  alert, cooperative and no distress  Mental Status:  oriented to person, place and time and normal affect  Lungs:  clear to auscultation bilaterally, normal effort  Heart:  regular rate and rhythm  Abdomen:  soft, nontender, nondistended, normal bowel sounds  Extremities:  no edema, redness, tenderness in the calves  Skin:  no gross lesions, rashes, induration    Assessment:        Hospital Problems           Last Modified POA    * (Principal) GI bleed 9/13/2019 Yes    Hypertension 9/13/2019 Yes    Alcoholism ([address] 9/13/2019 Yes    Tobacco abuse 9/13/2019 Yes    Seizure disorder ([address] 9/13/2019 Yes    NSVT (nonsustained ventricular tachycardia) ([address] 9/13/2019 Yes    Suicidal ideation 9/13/2019 Yes    Bipolar 1 disorder, depressed, severe ([address] 9/13/2019 Yes    Alcohol use disorder, severe, dependence ([address] 9/13/2019 Yes          Plan:        - GI consulted - plans for EGD pending cardiology clearance  - Cardiology consulted - check ECHO, if EF preserved ok to proceed  - Neurology consulted - continue seizure precautions, AED  - Psych consulted - admit to [facility] when medically cleared, start zoloft  - Monitor H/H  - Continue PPI  - Continue CIWA  - Hold home meds due to non-compliance  - Sitter at bedside  - Suicide precautions  - Check ECHO, if preserved EF, clear for EGD  - [facility] when medically stable       Vince Greene MD  9/15/2019  10:16 AM

## 2019-09-15 NOTE — PROGRESS NOTES
commands; speech is fluent, no dysarthria, aphasia   Cranial nerves   II - visual fields intact to confrontation; pupils reactive  III, IV, VI - extraocular muscles intact; no JOS; no nystagmus; no ptosis   V - normal facial sensation                                                               VII - normal facial symmetry                                                             VIII - intact hearing                                                                             IX, X - symmetrical palate elevation                                               XI - symmetrical shoulder shrug                                                       XII - midline tongue without atrophy or fasciculation   Motor function  Strength: 5/5 RUE, 5/5 RLE, 5/5 LUE, 5/5  LLE  Normal bulk and tone. Sensory function Grossly intact     Cerebellar Grossly intact   Reflex function 2/4 symmetric throughout . Downgoing plantar response bilaterally. (-)Iyer's sign bilaterally    Gait                  Not tested       DATA      Lab Results   Component Value Date    WBC 4.2 09/14/2019    HGB 13.1 09/15/2019    HCT 39.1 (L) 09/14/2019    PLT 67 (L) 09/14/2019    CHOL 166 09/13/2019    TRIG 84 09/13/2019     09/13/2019     (H) 09/15/2019     (H) 09/15/2019     09/14/2019    K 3.6 (L) 09/14/2019    CL 95 (L) 09/14/2019    CREATININE 0.44 (L) 09/14/2019    BUN 10 09/14/2019    CO2 26 09/14/2019    INR 1.0 09/15/2019        9/13/2019 12:19 9/14/2019          9/15/2019    11:06                   05:24   Valproic Acid Lvl <3 (L) 30 (L)                    38 (L)   Valproic Acid, Free  5.4 (L)                   5.9 (L)             CT HEAD (9/12/2019):  Mild diffuse cerebral & cerebellar atrophy     ECHO    EEG (9/13/2019): Normal                          IMPRESSION & PLAN: 44 y.o.  male admitted with  Breakthrough seizures with AED non-compliance; continue Depakote 500 mg PO Q12hr; continue Ativan 1 mg

## 2019-09-15 NOTE — PROGRESS NOTES
THE [facility] AT [employer] [facility] Progress Note    Hui Schilling is a 44 y.o. male patient. Hospitalization Day:2      Chief consult reason:   GI bleed-hematemesis    Subjective:  Pt seen and examined. No acute issues overnight. Pt is resting in bed. No episodes of hematemesis. Awaiting ECHo to assess LV function-if preserved we will proceed with EGD  LFT's continue to improve  Pt a/o x 3-only slight tremors    VITALS:  /84   Pulse 152   Temp 97.8 °F (36.6 °C) (Oral)   Resp 18   Ht 6' (1.829 m)   Wt 182 lb 8.7 oz (82.8 kg)   SpO2 97%   BMI 24.76 kg/m²   TEMPERATURE:  Current - Temp: 97.8 °F (36.6 °C); Max - Temp  Av.3 °F (36.8 °C)  Min: 97.8 °F (36.6 °C)  Max: 98.6 °F (37 °C)    Physical Assessment:  General appearance:  alert, cooperative and slightly anxious  Lungs:  clear to auscultation bilaterally, normal effort  Heart:  regular rate and rhythm, no murmur  Abdomen:  soft, nontender, nondistended, normal bowel sounds, no masses, hepatomegaly, splenomegaly  Extremities:  no edema, redness, tenderness in the calves  Skin:  no gross lesions, rashes, induration    Data Review:  LABS and IMAGING:     CBC  Recent Labs     19  1830  19  0455 19  1106 19  1843 09/15/19  0007 09/15/19  0524 09/15/19  1123   WBC 5.6  --  4.2  --   --   --   --   --    HGB 13.6   < > 13.2 13.3 13.0 13.6 13.9 13.1   MCV 97.7  --  99.7  --   --   --   --   --    RDW 12.6  --  12.3  --   --   --   --   --    PLT See Reflexed IPF Result  --  67*  --   --   --   --   --     < > = values in this interval not displayed. ANEMIA STUDIES  No results for input(s): LABIRON, TIBC, FERRITIN, [id number], FOLATE, OCCULTBLD in the last 72 hours.     BMP  Recent Labs     19  1830 19  0455   * 136   K 3.6* 3.6*   CL 87* 95*   CO2 27 26   BUN 7 10   CREATININE 0.52* 0.44*   GLUCOSE 75 87   CALCIUM 9.6 9.1       LFTS  Recent Labs     19  1830 19  0455 09/15/19  0524   ALKPHOS 89 101 105 hydronephrosis.       PANCREAS:  Not well seen.       OTHER: No evidence of right upper quadrant ascites.           Impression   Coarsely increased echogenicity of the liver, finding suggesting diffuse   hepatocellular disease to include at least a component of fatty infiltration. Principal Problem:    GI bleed  Active Problems:    Hypertension    Alcoholism ([address]    Tobacco abuse    Seizure disorder (HCC)    NSVT (nonsustained ventricular tachycardia) (HCC)    Suicidal ideation    Bipolar 1 disorder, depressed, severe (HCC)    Alcohol use disorder, severe, dependence ([address]  Resolved Problems:    * No resolved hospital problems. *       GI Assessment:  1. Hematemesis- No further episodes of hematemesis.      2. HX ETOH misuse-recent binge from Tuesday this week until admission. LFT pattern indicated Alcoholic Hepatis-improving. US showed coarsely increased echogenicity of the liver/diffuse hepatocellular disease  -AFP WNL, Hep panel non-reactive, Lipid panel WNL     3. Current suicidal ideations-no plan or action-guard at bedside for pt safety. Mgt per primary/Psych     3. HX of Wernickie's encephalopathy     4. HX of seizures     Plan of care:  1. Will plan for EGD once cleared per cardiology. Per note Dr. Radha Miguel LVEF is preserved, then okay to proceed with EGD at intermediate risk. Hopefully Echo can be completed and follow by EGD. 2. Monitor daily CBC, BMP, LFT's, INR  3. Monitor for alcohol withdrawal-rec CIWA  4. Cont MV, Thiamine, Folate  5. Will follow up liver serologies-pending  6. Cont Protonix 40 mg IV BID  7. Monitor for suicidal actions-guard at bedside  8. Rec watch closely for seizures due to alcohol withdrawal  9. Pt will eventually need OP screening Colonoscopy  10. Will follow closely     This plan was formulated in collaboration with Dr. Molly Padilla MD      Thank you for allowing me to participate in the care of your patient. Please feel free to contact me with any questions or concerns.

## 2019-09-16 ENCOUNTER — ANESTHESIA (OUTPATIENT)
Dept: ENDOSCOPY | Age: 49
DRG: 253 | End: 2019-09-16
Payer: COMMERCIAL

## 2019-09-16 ENCOUNTER — ANESTHESIA EVENT (OUTPATIENT)
Dept: ENDOSCOPY | Age: 49
DRG: 253 | End: 2019-09-16
Payer: COMMERCIAL

## 2019-09-16 LAB
ALBUMIN SERPL-MCNC: 3.7 G/DL (ref 3.5–5.2)
ALBUMIN/GLOBULIN RATIO: 1.2 (ref 1–2.5)
ALP BLD-CCNC: 108 U/L (ref 40–129)
ALT SERPL-CCNC: 102 U/L (ref 5–41)
ANION GAP SERPL CALCULATED.3IONS-SCNC: 12 MMOL/L (ref 9–17)
ANTI-NUCLEAR ANTIBODY (ANA): NEGATIVE
AST SERPL-CCNC: 95 U/L
BILIRUB SERPL-MCNC: 0.72 MG/DL (ref 0.3–1.2)
BILIRUBIN DIRECT: 0.31 MG/DL
BILIRUBIN, INDIRECT: 0.41 MG/DL (ref 0–1)
BUN BLDV-MCNC: 9 MG/DL (ref 6–20)
BUN/CREAT BLD: ABNORMAL (ref 9–20)
CALCIUM SERPL-MCNC: 9.4 MG/DL (ref 8.6–10.4)
CHLORIDE BLD-SCNC: 102 MMOL/L (ref 98–107)
CO2: 24 MMOL/L (ref 20–31)
CREAT SERPL-MCNC: 0.51 MG/DL (ref 0.7–1.2)
GFR AFRICAN AMERICAN: >60 ML/MIN
GFR NON-AFRICAN AMERICAN: >60 ML/MIN
GFR SERPL CREATININE-BSD FRML MDRD: ABNORMAL ML/MIN/{1.73_M2}
GFR SERPL CREATININE-BSD FRML MDRD: ABNORMAL ML/MIN/{1.73_M2}
GLOBULIN: ABNORMAL G/DL (ref 1.5–3.8)
GLUCOSE BLD-MCNC: 79 MG/DL (ref 70–99)
HEMOGLOBIN: 13.9 G/DL (ref 13–17)
HEMOGLOBIN: 14 G/DL (ref 13–17)
INR BLD: 1
LV EF: 60 %
LVEF MODALITY: NORMAL
MAGNESIUM: 2.2 MG/DL (ref 1.6–2.6)
POTASSIUM SERPL-SCNC: 3.8 MMOL/L (ref 3.7–5.3)
PROTHROMBIN TIME: 10.6 SEC (ref 9–12)
SMOOTH MUSCLE ANTIBODY: 13 UNITS (ref 0–19)
SMOOTH MUSCLE ANTIBODY: NORMAL
SODIUM BLD-SCNC: 138 MMOL/L (ref 135–144)
TOTAL PROTEIN: 6.8 G/DL (ref 6.4–8.3)

## 2019-09-16 PROCEDURE — 6370000000 HC RX 637 (ALT 250 FOR IP): Performed by: INTERNAL MEDICINE

## 2019-09-16 PROCEDURE — 6370000000 HC RX 637 (ALT 250 FOR IP): Performed by: NURSE PRACTITIONER

## 2019-09-16 PROCEDURE — 6360000002 HC RX W HCPCS: Performed by: NURSE PRACTITIONER

## 2019-09-16 PROCEDURE — 36415 COLL VENOUS BLD VENIPUNCTURE: CPT

## 2019-09-16 PROCEDURE — 80048 BASIC METABOLIC PNL TOTAL CA: CPT

## 2019-09-16 PROCEDURE — 2580000003 HC RX 258: Performed by: INTERNAL MEDICINE

## 2019-09-16 PROCEDURE — 93306 TTE W/DOPPLER COMPLETE: CPT

## 2019-09-16 PROCEDURE — 80076 HEPATIC FUNCTION PANEL: CPT

## 2019-09-16 PROCEDURE — 85610 PROTHROMBIN TIME: CPT

## 2019-09-16 PROCEDURE — 2580000003 HC RX 258: Performed by: NURSE PRACTITIONER

## 2019-09-16 PROCEDURE — 2060000000 HC ICU INTERMEDIATE R&B

## 2019-09-16 PROCEDURE — 99232 SBSQ HOSP IP/OBS MODERATE 35: CPT | Performed by: INTERNAL MEDICINE

## 2019-09-16 PROCEDURE — 83735 ASSAY OF MAGNESIUM: CPT

## 2019-09-16 PROCEDURE — C9113 INJ PANTOPRAZOLE SODIUM, VIA: HCPCS | Performed by: NURSE PRACTITIONER

## 2019-09-16 PROCEDURE — 85018 HEMOGLOBIN: CPT

## 2019-09-16 PROCEDURE — 6370000000 HC RX 637 (ALT 250 FOR IP): Performed by: STUDENT IN AN ORGANIZED HEALTH CARE EDUCATION/TRAINING PROGRAM

## 2019-09-16 RX ORDER — M-VIT,TX,IRON,MINS/CALC/FOLIC 27MG-0.4MG
1 TABLET ORAL DAILY
Qty: 30 TABLET | Refills: 3 | Status: SHIPPED | OUTPATIENT
Start: 2019-09-16 | End: 2019-11-27 | Stop reason: SDUPTHER

## 2019-09-16 RX ORDER — SERTRALINE HYDROCHLORIDE 25 MG/1
25 TABLET, FILM COATED ORAL DAILY
Status: ON HOLD | DISCHARGE
Start: 2019-09-17 | End: 2019-09-23 | Stop reason: HOSPADM

## 2019-09-16 RX ORDER — THIAMINE MONONITRATE (VIT B1) 100 MG
100 TABLET ORAL DAILY
Qty: 30 TABLET | Refills: 3 | Status: SHIPPED | OUTPATIENT
Start: 2019-09-16 | End: 2019-12-05 | Stop reason: SDUPTHER

## 2019-09-16 RX ORDER — DIVALPROEX SODIUM 500 MG/1
500 TABLET, DELAYED RELEASE ORAL EVERY 12 HOURS SCHEDULED
Qty: 60 TABLET | Refills: 3 | Status: ON HOLD | OUTPATIENT
Start: 2019-09-16 | End: 2019-09-23 | Stop reason: HOSPADM

## 2019-09-16 RX ORDER — FOLIC ACID 1 MG/1
1 TABLET ORAL DAILY
Qty: 30 TABLET | Refills: 3 | Status: SHIPPED | OUTPATIENT
Start: 2019-09-16 | End: 2019-12-05 | Stop reason: SDUPTHER

## 2019-09-16 RX ADMIN — SERTRALINE 25 MG: 25 TABLET, FILM COATED ORAL at 09:25

## 2019-09-16 RX ADMIN — Medication 10 ML: at 09:26

## 2019-09-16 RX ADMIN — HYDROCODONE BITARTRATE AND ACETAMINOPHEN 2 TABLET: 5; 325 TABLET ORAL at 20:28

## 2019-09-16 RX ADMIN — METOPROLOL TARTRATE 50 MG: 25 TABLET ORAL at 20:25

## 2019-09-16 RX ADMIN — DIVALPROEX SODIUM 500 MG: 500 TABLET, DELAYED RELEASE ORAL at 20:25

## 2019-09-16 RX ADMIN — DIVALPROEX SODIUM 500 MG: 500 TABLET, DELAYED RELEASE ORAL at 09:26

## 2019-09-16 RX ADMIN — FOLIC ACID 1 MG: 1 TABLET ORAL at 09:26

## 2019-09-16 RX ADMIN — Medication 10 ML: at 09:28

## 2019-09-16 RX ADMIN — Medication 10 ML: at 20:25

## 2019-09-16 RX ADMIN — PANTOPRAZOLE SODIUM 40 MG: 40 INJECTION, POWDER, FOR SOLUTION INTRAVENOUS at 09:26

## 2019-09-16 RX ADMIN — Medication 100 MG: at 09:25

## 2019-09-16 RX ADMIN — PANTOPRAZOLE SODIUM 40 MG: 40 INJECTION, POWDER, FOR SOLUTION INTRAVENOUS at 20:24

## 2019-09-16 RX ADMIN — METOPROLOL TARTRATE 50 MG: 25 TABLET ORAL at 09:25

## 2019-09-16 RX ADMIN — THERA TABS 1 TABLET: TAB at 09:25

## 2019-09-16 ASSESSMENT — PAIN SCALES - GENERAL
PAINLEVEL_OUTOF10: 0
PAINLEVEL_OUTOF10: 7
PAINLEVEL_OUTOF10: 0
PAINLEVEL_OUTOF10: 4
PAINLEVEL_OUTOF10: 0

## 2019-09-16 NOTE — PROGRESS NOTES
Oral Daily    thiamine  100 mg Oral Daily    pantoprazole  40 mg Intravenous BID    And    sodium chloride (PF)  10 mL Intravenous BID    sodium chloride flush  10 mL Intravenous 2 times per day    nicotine  1 patch Transdermal Daily     Continuous Infusions:   PRN Meds: LORazepam, acetaminophen, HYDROcodone 5 mg - acetaminophen **OR** HYDROcodone 5 mg - acetaminophen, morphine **OR** morphine, ondansetron, sodium chloride flush, LORazepam **OR** LORazepam **OR** LORazepam **OR** LORazepam **OR** LORazepam **OR** LORazepam **OR** LORazepam **OR** LORazepam    Data:     Past Medical History:   has a past medical history of Alcohol abuse, Anxiety, Depression, and Hypertension. Social History:   reports that he has been smoking. He has been smoking about 2.00 packs per day. He has never used smokeless tobacco. He reports that he drinks alcohol. He reports that he has current or past drug history. Family History:   Family History   Problem Relation Age of Onset    High Blood Pressure Mother     Diabetes Mother        Vitals:  /67   Pulse 60   Temp 98.2 °F (36.8 °C) (Oral)   Resp 16   Ht 6' (1.829 m)   Wt 181 lb 11.2 oz (82.4 kg)   SpO2 99%   BMI 24.64 kg/m²   Temp (24hrs), Av.4 °F (36.9 °C), Min:97.8 °F (36.6 °C), Max:99.3 °F (37.4 °C)    No results for input(s): POCGLU in the last 72 hours. I/O (24Hr):     Intake/Output Summary (Last 24 hours) at 2019 1349  Last data filed at 2019 0600  Gross per 24 hour   Intake 720 ml   Output --   Net 720 ml       Labs:  Hematology:  Recent Labs     19  0455 19  1106  09/15/19  0524 09/15/19  1123 09/15/19  2009 09/16/19  0308 19  0726   WBC 4.2  --   --   --   --   --   --   --    RBC 3.92*  --   --   --   --   --   --   --    HGB 13.2 13.3   < > 13.9 13.1 13.0  --  14.0   HCT 39.1*  --   --   --   --   --   --   --    MCV 99.7  --   --   --   --   --   --   --    MCH 33.7*  --   --   --   --   --   --   --    MCHC 33.8

## 2019-09-16 NOTE — PROGRESS NOTES
AMYLASE/LIPASE/AMMONIA  No results for input(s): AMYLASE, LIPASE, AMMONIA in the last 72 hours. Acute Hepatitis Panel   Lab Results   Component Value Date    HEPBSAG NONREACTIVE 09/13/2019    HEPCAB NONREACTIVE 09/13/2019    HEPBIGM NONREACTIVE 09/13/2019    HEPAIGM NONREACTIVE 09/13/2019       HCV Genotype   No results found for: HEPATITISCGENOTYPE    HCV Quantitative   No results found for: HCVQNT    LIVER WORK UP:    AFP  Lab Results   Component Value Date    AFP 3.1 09/13/2019       Alpha 1 antitrypsin   No results found for: A1A    Anti - Liver/Kidney Ab  Lab Results   Component Value Date    LIVER-KIDNEYMICROSOMALAB <1:20 09/13/2019       JENNY  Lab Results   Component Value Date    JENNY NEGATIVE 09/13/2019       AMA  No results found for: MITOAB    ASMA  Lab Results   Component Value Date    SMOOTHMUSCAB 1:20 09/13/2019    SMOOTHMUSCAB 13 09/13/2019       Ceruloplasmin  No results found for: CERULOPLSM    Celiac panel  No results found for: Vadim Nilsa, IGA    PT/INR  Recent Labs     09/14/19  1106 09/15/19  0524 09/16/19  0308   PROTIME 11.4 10.9 10.6   INR 1.1 1.0 1.0       Cancer Markers:  CEA:  No results for input(s): CEA in the last 72 hours. Ca 125:  No results for input(s):  in the last 72 hours. Ca 19-9:   Invalid input(s):   AFP:   Recent Labs     09/13/19  1838   AFP 3.1       Lactic acid:Invalid input(s): LACTIC ACID    Radiology Review:      9/13/ Liver Ultrasound: FINDINGS:   LIVER:  The overall echogenicity of the liver is coarsely increased.  No   focal lesion of the visualized liver parenchyma.       There is normal hepatofugal flow within the main portal vein; velocity is   approximately 20 centimeter/second.       BILIARY SYSTEM:  No intraluminal stones or bowel wall thickening is   identified. [place] sonographic Raymond's sign.       Common bile duct is within normal limits measuring 2-3 mm.       RIGHT KIDNEY: The right kidney is grossly unremarkable

## 2019-09-16 NOTE — PROGRESS NOTES
cooperative with exam  HEENT: Head: Normocephalic, no lesions, without obvious abnormality. Neck:no JVD, trachea midline, no adenopathy  Lungs: Clear to auscultation  Heart: Regular rate and rhythm, s1/s2 auscultated, no murmurs. SR  Abdomen: soft, non-tender, bowel sounds active  Extremities: no edema  Neurologic: not done        Assessment / Acute Cardiac Problems:   1. HTN  2. NSVT  3. GIB  4. Elevated liver enzymes  5. Tobacco & ETOH abuse  6. Seizure disorder      Patient Active Problem List:     Wernicke-Korsakoff psychosis     Hypertension     Alcoholism ([facility]     Malnutrition ([facility]     Tobacco abuse     GI bleed     Seizure disorder (HCC)     NSVT (nonsustained ventricular tachycardia) (HCC)     Suicidal ideation     Bipolar 1 disorder, depressed, severe (HCC)     Alcohol use disorder, severe, dependence ([facility] [facility]      Plan of Treatment:   1. Await echo. 2. No further NSVT/tachycardia. Continue PO BB.  Recheck BMP & Mg    Electronically signed by KODY Victor CNP on 9/16/2019 at 10:38 AM  [address]  [phone]

## 2019-09-17 ENCOUNTER — HOSPITAL ENCOUNTER (INPATIENT)
Age: 49
LOS: 10 days | Discharge: HOME | DRG: 753 | End: 2019-09-27
Attending: PSYCHIATRY & NEUROLOGY | Admitting: PSYCHIATRY & NEUROLOGY
Payer: COMMERCIAL

## 2019-09-17 VITALS
WEIGHT: 184.75 LBS | OXYGEN SATURATION: 100 % | BODY MASS INDEX: 25.02 KG/M2 | HEART RATE: 82 BPM | DIASTOLIC BLOOD PRESSURE: 78 MMHG | RESPIRATION RATE: 16 BRPM | SYSTOLIC BLOOD PRESSURE: 125 MMHG | TEMPERATURE: 98 F | HEIGHT: 72 IN

## 2019-09-17 VITALS
DIASTOLIC BLOOD PRESSURE: 62 MMHG | RESPIRATION RATE: 17 BRPM | SYSTOLIC BLOOD PRESSURE: 104 MMHG | OXYGEN SATURATION: 99 %

## 2019-09-17 PROBLEM — F31.30 BIPOLAR I DISORDER, MOST RECENT EPISODE DEPRESSED (HCC): Status: ACTIVE | Noted: 2019-09-17

## 2019-09-17 PROBLEM — K92.2 GI BLEED: Status: RESOLVED | Noted: 2019-09-12 | Resolved: 2019-09-17

## 2019-09-17 LAB
ABSOLUTE EOS #: 0.52 K/UL (ref 0–0.44)
ABSOLUTE IMMATURE GRANULOCYTE: <0.03 K/UL (ref 0–0.3)
ABSOLUTE LYMPH #: 1.27 K/UL (ref 1.1–3.7)
ABSOLUTE MONO #: 1.25 K/UL (ref 0.1–1.2)
ALBUMIN SERPL-MCNC: 3.6 G/DL (ref 3.5–5.2)
ALBUMIN/GLOBULIN RATIO: 1.1 (ref 1–2.5)
ALP BLD-CCNC: 96 U/L (ref 40–129)
ALT SERPL-CCNC: 113 U/L (ref 5–41)
AST SERPL-CCNC: 104 U/L
BASOPHILS # BLD: 1 % (ref 0–2)
BASOPHILS ABSOLUTE: 0.07 K/UL (ref 0–0.2)
BILIRUB SERPL-MCNC: 0.51 MG/DL (ref 0.3–1.2)
BILIRUBIN DIRECT: 0.25 MG/DL
BILIRUBIN, INDIRECT: 0.26 MG/DL (ref 0–1)
DIFFERENTIAL TYPE: ABNORMAL
EOSINOPHILS RELATIVE PERCENT: 10 % (ref 1–4)
GLOBULIN: ABNORMAL G/DL (ref 1.5–3.8)
HCT VFR BLD CALC: 42 % (ref 40.7–50.3)
HEMOGLOBIN: 13.9 G/DL (ref 13–17)
IMMATURE GRANULOCYTES: 0 %
INR BLD: 1
LYMPHOCYTES # BLD: 25 % (ref 24–43)
MCH RBC QN AUTO: 34.2 PG (ref 25.2–33.5)
MCHC RBC AUTO-ENTMCNC: 33.1 G/DL (ref 28.4–34.8)
MCV RBC AUTO: 103.4 FL (ref 82.6–102.9)
MONOCYTES # BLD: 25 % (ref 3–12)
NRBC AUTOMATED: 0 PER 100 WBC
PDW BLD-RTO: 12.9 % (ref 11.8–14.4)
PLATELET # BLD: ABNORMAL K/UL (ref 138–453)
PLATELET ESTIMATE: ABNORMAL
PLATELET, FLUORESCENCE: 85 K/UL (ref 138–453)
PLATELET, IMMATURE FRACTION: 9.3 % (ref 1.1–10.3)
PMV BLD AUTO: ABNORMAL FL (ref 8.1–13.5)
PROTHROMBIN TIME: 10.6 SEC (ref 9–12)
RBC # BLD: 4.06 M/UL (ref 4.21–5.77)
RBC # BLD: ABNORMAL 10*6/UL
SEG NEUTROPHILS: 38 % (ref 36–65)
SEGMENTED NEUTROPHILS ABSOLUTE COUNT: 1.96 K/UL (ref 1.5–8.1)
TOTAL PROTEIN: 7 G/DL (ref 6.4–8.3)
WBC # BLD: 5.1 K/UL (ref 3.5–11.3)
WBC # BLD: ABNORMAL 10*3/UL

## 2019-09-17 PROCEDURE — C9113 INJ PANTOPRAZOLE SODIUM, VIA: HCPCS | Performed by: INTERNAL MEDICINE

## 2019-09-17 PROCEDURE — 6360000002 HC RX W HCPCS: Performed by: NURSE ANESTHETIST, CERTIFIED REGISTERED

## 2019-09-17 PROCEDURE — 3700000000 HC ANESTHESIA ATTENDED CARE: Performed by: INTERNAL MEDICINE

## 2019-09-17 PROCEDURE — 6360000002 HC RX W HCPCS: Performed by: INTERNAL MEDICINE

## 2019-09-17 PROCEDURE — 85055 RETICULATED PLATELET ASSAY: CPT

## 2019-09-17 PROCEDURE — 99239 HOSP IP/OBS DSCHRG MGMT >30: CPT | Performed by: INTERNAL MEDICINE

## 2019-09-17 PROCEDURE — 85025 COMPLETE CBC W/AUTO DIFF WBC: CPT

## 2019-09-17 PROCEDURE — 6370000000 HC RX 637 (ALT 250 FOR IP): Performed by: INTERNAL MEDICINE

## 2019-09-17 PROCEDURE — 7100000011 HC PHASE II RECOVERY - ADDTL 15 MIN: Performed by: INTERNAL MEDICINE

## 2019-09-17 PROCEDURE — 2580000003 HC RX 258: Performed by: INTERNAL MEDICINE

## 2019-09-17 PROCEDURE — 7100000010 HC PHASE II RECOVERY - FIRST 15 MIN: Performed by: INTERNAL MEDICINE

## 2019-09-17 PROCEDURE — 3609012400 HC EGD TRANSORAL BIOPSY SINGLE/MULTIPLE: Performed by: INTERNAL MEDICINE

## 2019-09-17 PROCEDURE — 80076 HEPATIC FUNCTION PANEL: CPT

## 2019-09-17 PROCEDURE — 1240000000 HC EMOTIONAL WELLNESS R&B

## 2019-09-17 PROCEDURE — 0DB68ZX EXCISION OF STOMACH, VIA NATURAL OR ARTIFICIAL OPENING ENDOSCOPIC, DIAGNOSTIC: ICD-10-PCS | Performed by: INTERNAL MEDICINE

## 2019-09-17 PROCEDURE — 88305 TISSUE EXAM BY PATHOLOGIST: CPT

## 2019-09-17 PROCEDURE — 43239 EGD BIOPSY SINGLE/MULTIPLE: CPT | Performed by: INTERNAL MEDICINE

## 2019-09-17 PROCEDURE — 3700000001 HC ADD 15 MINUTES (ANESTHESIA): Performed by: INTERNAL MEDICINE

## 2019-09-17 PROCEDURE — 2500000003 HC RX 250 WO HCPCS: Performed by: NURSE ANESTHETIST, CERTIFIED REGISTERED

## 2019-09-17 PROCEDURE — 36415 COLL VENOUS BLD VENIPUNCTURE: CPT

## 2019-09-17 PROCEDURE — 85610 PROTHROMBIN TIME: CPT

## 2019-09-17 RX ORDER — METOPROLOL SUCCINATE 25 MG/1
25 TABLET, EXTENDED RELEASE ORAL DAILY
Qty: 30 TABLET | Refills: 3 | Status: ON HOLD | OUTPATIENT
Start: 2019-09-17 | End: 2020-06-15 | Stop reason: HOSPADM

## 2019-09-17 RX ORDER — NICOTINE 21 MG/24HR
1 PATCH, TRANSDERMAL 24 HOURS TRANSDERMAL DAILY
Status: DISCONTINUED | OUTPATIENT
Start: 2019-09-18 | End: 2019-09-27 | Stop reason: HOSPADM

## 2019-09-17 RX ORDER — PANTOPRAZOLE SODIUM 40 MG/1
40 TABLET, DELAYED RELEASE ORAL DAILY
Qty: 30 TABLET | Refills: 3 | Status: ON HOLD | OUTPATIENT
Start: 2019-09-17 | End: 2020-06-15 | Stop reason: HOSPADM

## 2019-09-17 RX ORDER — ACETAMINOPHEN 325 MG/1
650 TABLET ORAL EVERY 4 HOURS PRN
Status: DISCONTINUED | OUTPATIENT
Start: 2019-09-17 | End: 2019-09-26

## 2019-09-17 RX ORDER — DIVALPROEX SODIUM 500 MG/1
500 TABLET, DELAYED RELEASE ORAL EVERY 12 HOURS SCHEDULED
Status: CANCELLED | OUTPATIENT
Start: 2019-09-17

## 2019-09-17 RX ORDER — PROPOFOL 10 MG/ML
INJECTION, EMULSION INTRAVENOUS PRN
Status: DISCONTINUED | OUTPATIENT
Start: 2019-09-17 | End: 2019-09-17 | Stop reason: SDUPTHER

## 2019-09-17 RX ORDER — METOPROLOL SUCCINATE 25 MG/1
25 TABLET, EXTENDED RELEASE ORAL DAILY
Status: DISCONTINUED | OUTPATIENT
Start: 2019-09-18 | End: 2019-09-17 | Stop reason: HOSPADM

## 2019-09-17 RX ORDER — FOLIC ACID 1 MG/1
1 TABLET ORAL DAILY
Status: DISCONTINUED | OUTPATIENT
Start: 2019-09-18 | End: 2019-09-27 | Stop reason: HOSPADM

## 2019-09-17 RX ORDER — PANTOPRAZOLE SODIUM 40 MG/1
40 TABLET, DELAYED RELEASE ORAL
Status: CANCELLED | OUTPATIENT
Start: 2019-09-18

## 2019-09-17 RX ORDER — LIDOCAINE HYDROCHLORIDE 10 MG/ML
INJECTION, SOLUTION EPIDURAL; INFILTRATION; INTRACAUDAL; PERINEURAL PRN
Status: DISCONTINUED | OUTPATIENT
Start: 2019-09-17 | End: 2019-09-17 | Stop reason: SDUPTHER

## 2019-09-17 RX ORDER — ACETAMINOPHEN 325 MG/1
650 TABLET ORAL EVERY 4 HOURS PRN
Status: CANCELLED | OUTPATIENT
Start: 2019-09-17

## 2019-09-17 RX ORDER — METOPROLOL SUCCINATE 25 MG/1
25 TABLET, EXTENDED RELEASE ORAL DAILY
Status: CANCELLED | OUTPATIENT
Start: 2019-09-18

## 2019-09-17 RX ORDER — FOLIC ACID 1 MG/1
1 TABLET ORAL DAILY
Status: CANCELLED | OUTPATIENT
Start: 2019-09-18

## 2019-09-17 RX ORDER — THIAMINE MONONITRATE (VIT B1) 100 MG
100 TABLET ORAL DAILY
Status: CANCELLED | OUTPATIENT
Start: 2019-09-18

## 2019-09-17 RX ORDER — DIVALPROEX SODIUM 500 MG/1
500 TABLET, DELAYED RELEASE ORAL EVERY 12 HOURS SCHEDULED
Status: DISCONTINUED | OUTPATIENT
Start: 2019-09-18 | End: 2019-09-27 | Stop reason: HOSPADM

## 2019-09-17 RX ORDER — SODIUM CHLORIDE 9 MG/ML
INJECTION, SOLUTION INTRAVENOUS CONTINUOUS
Status: DISCONTINUED | OUTPATIENT
Start: 2019-09-17 | End: 2019-09-17 | Stop reason: HOSPADM

## 2019-09-17 RX ORDER — SERTRALINE HYDROCHLORIDE 25 MG/1
25 TABLET, FILM COATED ORAL DAILY
Status: DISCONTINUED | OUTPATIENT
Start: 2019-09-18 | End: 2019-09-18

## 2019-09-17 RX ORDER — M-VIT,TX,IRON,MINS/CALC/FOLIC 27MG-0.4MG
1 TABLET ORAL DAILY
Status: DISCONTINUED | OUTPATIENT
Start: 2019-09-18 | End: 2019-09-27 | Stop reason: HOSPADM

## 2019-09-17 RX ORDER — PHENYLEPHRINE HCL IN 0.9% NACL 0.5 MG/5ML
SYRINGE (ML) INTRAVENOUS PRN
Status: DISCONTINUED | OUTPATIENT
Start: 2019-09-17 | End: 2019-09-17 | Stop reason: SDUPTHER

## 2019-09-17 RX ORDER — NICOTINE 21 MG/24HR
1 PATCH, TRANSDERMAL 24 HOURS TRANSDERMAL DAILY
Status: CANCELLED | OUTPATIENT
Start: 2019-09-18

## 2019-09-17 RX ORDER — GLYCOPYRROLATE 1 MG/5 ML
SYRINGE (ML) INTRAVENOUS PRN
Status: DISCONTINUED | OUTPATIENT
Start: 2019-09-17 | End: 2019-09-17 | Stop reason: SDUPTHER

## 2019-09-17 RX ORDER — METOPROLOL SUCCINATE 25 MG/1
25 TABLET, EXTENDED RELEASE ORAL DAILY
Status: DISCONTINUED | OUTPATIENT
Start: 2019-09-18 | End: 2019-09-27 | Stop reason: HOSPADM

## 2019-09-17 RX ORDER — PANTOPRAZOLE SODIUM 40 MG/1
40 TABLET, DELAYED RELEASE ORAL
Status: DISCONTINUED | OUTPATIENT
Start: 2019-09-18 | End: 2019-09-17 | Stop reason: HOSPADM

## 2019-09-17 RX ORDER — MULTIVITAMIN WITH FOLIC ACID 400 MCG
1 TABLET ORAL DAILY
Status: CANCELLED | OUTPATIENT
Start: 2019-09-18

## 2019-09-17 RX ORDER — THIAMINE MONONITRATE (VIT B1) 100 MG
100 TABLET ORAL DAILY
Status: DISCONTINUED | OUTPATIENT
Start: 2019-09-18 | End: 2019-09-27 | Stop reason: HOSPADM

## 2019-09-17 RX ORDER — PANTOPRAZOLE SODIUM 40 MG/1
40 TABLET, DELAYED RELEASE ORAL
Status: DISCONTINUED | OUTPATIENT
Start: 2019-09-18 | End: 2019-09-22

## 2019-09-17 RX ORDER — SERTRALINE HYDROCHLORIDE 25 MG/1
25 TABLET, FILM COATED ORAL DAILY
Status: CANCELLED | OUTPATIENT
Start: 2019-09-18

## 2019-09-17 RX ADMIN — THERA TABS 1 TABLET: TAB at 12:25

## 2019-09-17 RX ADMIN — PANTOPRAZOLE SODIUM 40 MG: 40 INJECTION, POWDER, FOR SOLUTION INTRAVENOUS at 12:25

## 2019-09-17 RX ADMIN — Medication 100 MG: at 12:25

## 2019-09-17 RX ADMIN — Medication 100 MCG: at 09:44

## 2019-09-17 RX ADMIN — Medication 10 ML: at 12:26

## 2019-09-17 RX ADMIN — FOLIC ACID 1 MG: 1 TABLET ORAL at 12:25

## 2019-09-17 RX ADMIN — SERTRALINE 25 MG: 25 TABLET, FILM COATED ORAL at 12:24

## 2019-09-17 RX ADMIN — PROPOFOL 60 MG: 10 INJECTION, EMULSION INTRAVENOUS at 09:39

## 2019-09-17 RX ADMIN — DIVALPROEX SODIUM 500 MG: 500 TABLET, DELAYED RELEASE ORAL at 12:25

## 2019-09-17 RX ADMIN — SODIUM CHLORIDE: 9 INJECTION, SOLUTION INTRAVENOUS at 09:01

## 2019-09-17 RX ADMIN — PROPOFOL 30 MG: 10 INJECTION, EMULSION INTRAVENOUS at 09:42

## 2019-09-17 RX ADMIN — LIDOCAINE HYDROCHLORIDE 50 MG: 10 INJECTION, SOLUTION EPIDURAL; INFILTRATION; INTRACAUDAL at 09:39

## 2019-09-17 RX ADMIN — Medication 10 ML: at 12:30

## 2019-09-17 RX ADMIN — Medication 0.2 MG: at 09:32

## 2019-09-17 ASSESSMENT — SLEEP AND FATIGUE QUESTIONNAIRES
DIFFICULTY FALLING ASLEEP: YES
RESTFUL SLEEP: NO
DO YOU HAVE DIFFICULTY SLEEPING: NO
DO YOU USE A SLEEP AID: YES
AVERAGE NUMBER OF SLEEP HOURS: 5
DIFFICULTY STAYING ASLEEP: NO
SLEEP PATTERN: DIFFICULTY FALLING ASLEEP
DIFFICULTY ARISING: NO

## 2019-09-17 ASSESSMENT — LIFESTYLE VARIABLES
HISTORY_ALCOHOL_USE: YES
SMOKING_STATUS: 1

## 2019-09-17 ASSESSMENT — PAIN SCALES - GENERAL
PAINLEVEL_OUTOF10: 0
PAINLEVEL_OUTOF10: 0

## 2019-09-17 ASSESSMENT — PATIENT HEALTH QUESTIONNAIRE - PHQ9: SUM OF ALL RESPONSES TO PHQ QUESTIONS 1-9: 10

## 2019-09-17 NOTE — PLAN OF CARE
Problem: Pain:  Goal: Pain level will decrease  Description  Pain level will decrease  Outcome: Ongoing  Goal: Control of acute pain  Description  Control of acute pain  Outcome: Ongoing  Goal: Control of chronic pain  Description  Control of chronic pain  Outcome: Ongoing     Problem: Falls - Risk of:  Goal: Will remain free from falls  Description  Will remain free from falls  Outcome: Ongoing  Goal: Absence of physical injury  Description  Absence of physical injury  Outcome: Ongoing     Problem: Suicide risk  Goal: Provide patient with safe environment  Description  Provide patient with safe environment  Outcome: Ongoing     Problem: Nutrition  Goal: Optimal nutrition therapy  9/13/2019 1332 by Paul Lopez RN  Outcome: Ongoing  9/13/2019 1204 by Harsha Haskins RD, LD  Outcome: Ongoing  Note:   Nutrition Problem: Predicted suboptimal energy intake  Intervention: Food and/or Nutrient Delivery: Continue current diet, Start ONS  Nutritional Goals: Pt to consume >75% of meals/supplements
decrease  Outcome: Ongoing  Goal: Ability to verbalize feelings about condition will improve  Description  Ability to verbalize feelings about condition will improve  Outcome: Ongoing     Problem: Discharge Planning:  Goal: Discharged to appropriate level of care  Description  Discharged to appropriate level of care  Outcome: Ongoing     Problem:  Bowel Function - Altered:  Goal: Bowel elimination is within specified parameters  Description  Bowel elimination is within specified parameters  Outcome: Ongoing     Problem: Fluid Volume - Imbalance:  Goal: Will show no signs and symptoms of excessive bleeding  Description  Will show no signs and symptoms of excessive bleeding  Outcome: Ongoing  Goal: Absence of imbalanced fluid volume signs and symptoms  Description  Absence of imbalanced fluid volume signs and symptoms  Outcome: Ongoing     Problem: Nausea/Vomiting:  Goal: Absence of nausea/vomiting  Description  Absence of nausea/vomiting  Outcome: Ongoing  Goal: Able to drink  Description  Able to drink  Outcome: Ongoing  Goal: Able to eat  Description  Able to eat  Outcome: Ongoing  Goal: Ability to achieve adequate nutritional intake will improve  Description  Ability to achieve adequate nutritional intake will improve  Outcome: Ongoing

## 2019-09-17 NOTE — ANESTHESIA PRE PROCEDURE
Intravenous Q1H PRN Rosa Holguin MD        Or    LORazepam (ATIVAN) tablet 4 mg  4 mg Oral Q1H PRN Rosa Holguin MD        Or    LORazepam (ATIVAN) injection 4 mg  4 mg Intravenous Q1H PRN Rosa Holguin MD        nicotine (NICODERM CQ) 21 MG/24HR 1 patch  1 patch Transdermal Daily Rosa Holguin MD   1 patch at 09/16/19 6579       Allergies:  No Known Allergies    Problem List:    Patient Active Problem List   Diagnosis Code    Wernicke-Korsakoff psychosis F04    Hypertension I10    Alcoholism ([facility] [facility] F10.20    [facility] E46    Tobacco abuse Z72.0    GI bleed K92.2    Seizure disorder ([facility] G40.909    NSVT (nonsustained ventricular tachycardia) ([facility]) I47.2    Suicidal ideation R45.851    Bipolar 1 disorder, depressed, severe ([facility]) F31.4    Alcohol use disorder, severe, dependence ([facility] F10.20       Past Medical History:        Diagnosis Date    Alcohol abuse     Anxiety     Depression     Hypertension        Past Surgical History:        Procedure Laterality Date    ANKLE SURGERY  age 10    left       Social History:    Social History     Tobacco Use    Smoking status: Current Every Day Smoker     Packs/day: 2.00    Smokeless tobacco: Never Used   Substance Use Topics    Alcohol use: Yes     Comment: 12 pack per week of beer                                Ready to quit: Not Answered  Counseling given: Not Answered      Vital Signs (Current):   Vitals:    09/16/19 2345 09/17/19 0000 09/17/19 0400 09/17/19 0600   BP: 134/78 134/79 120/68    Pulse: 67 65 58    Resp: 18  16    Temp: 99 °F (37.2 °C)      TempSrc: Oral      SpO2: 98%  100%    Weight:    184 lb 11.9 oz (83.8 kg)   Height:                                                  BP Readings from Last 3 Encounters:   09/17/19 120/68   09/13/19 (!) 157/92   07/12/16 (!) 134/106       NPO Status: Time of last liquid consumption: 0600(sip of water)                        Time of last solid consumption: 0931                        Date of last liquid consumption: 09/13/19                        Date of last solid food consumption: 09/10/19    BMI:   Wt Readings from Last 3 Encounters:   09/17/19 184 lb 11.9 oz (83.8 kg)   09/12/19 190 lb (86.2 kg)   07/12/16 203 lb (92.1 kg)     Body mass index is 25.06 kg/m². CBC:   Lab Results   Component Value Date    WBC 5.1 09/17/2019    RBC 4.06 09/17/2019    HGB 13.9 09/17/2019    HCT 42.0 09/17/2019    .4 09/17/2019    RDW 12.9 09/17/2019    PLT See Reflexed IPF Result 09/17/2019       CMP:   Lab Results   Component Value Date     09/16/2019    K 3.8 09/16/2019     09/16/2019    CO2 24 09/16/2019    BUN 9 09/16/2019    CREATININE 0.51 09/16/2019    GFRAA >60 09/16/2019    LABGLOM >60 09/16/2019    GLUCOSE 79 09/16/2019    PROT 7.0 09/17/2019    CALCIUM 9.4 09/16/2019    BILITOT 0.51 09/17/2019    ALKPHOS 96 09/17/2019     09/17/2019     09/17/2019       POC Tests: No results for input(s): POCGLU, POCNA, POCK, POCCL, POCBUN, POCHEMO, POCHCT in the last 72 hours.     Coags:   Lab Results   Component Value Date    PROTIME 10.6 09/17/2019    INR 1.0 09/17/2019       HCG (If Applicable): No results found for: PREGTESTUR, PREGSERUM, HCG, HCGQUANT     ABGs: No results found for: PHART, PO2ART, FUF7JCG, LIC1DVI, BEART, W5VIPINJ     Type & Screen (If Applicable):  No results found for: LABABO, LABRH    Anesthesia Evaluation    Airway: Mallampati: II       Mouth opening: > = 3 FB Dental:          Pulmonary:   (+) decreased breath sounds,  current smoker                           Cardiovascular:    (+) hypertension:, dysrhythmias:,         Rhythm: regular  Rate: normal                    Neuro/Psych:   (+) psychiatric history:             ROS comment: Wernicke-Korsakoff psychosis  Suicidal ideation  Bipolar 1 disorder, depressed, severe (HCC)  Alcohol use disorder, severe, dependence ([facility]     GI/Hepatic/Renal: Neg GI/Hepatic/Renal ROS            Endo/Other: Negative Endo/Other ROS

## 2019-09-17 NOTE — BH NOTE
Patient given tobacco quitline number 28611917371 at this time, refusing to call at this time, states \" I just dont want to quit now\"- patient given information as to the dangers of long term tobacco use. Continue to reinforce the importance of tobacco cessation.

## 2019-09-17 NOTE — DISCHARGE SUMMARY
Patient discharged safely via stretcher by Tommy Ortiz. Report called to [facility] at [facility].

## 2019-09-17 NOTE — ANESTHESIA POSTPROCEDURE EVALUATION
Department of Anesthesiology  Postprocedure Note    Patient: Alexia Gómez  MRN: [id number]  YOB: 1970  Date of evaluation: 9/17/2019  Time:  10:49 AM     Procedure Summary     Date:  09/17/19 Room / Location:  [facility] 04 / [facility]    Anesthesia Start:  0929 Anesthesia Stop:  7890    Procedure:  EGD BIOPSY (N/A ) Diagnosis:  (hematemesis)    Surgeon:  Amanda Jacob MD Responsible Provider:  Kaitlyn Constantino MD    Anesthesia Type:  MAC ASA Status:  3          Anesthesia Type: MAC    Taras Phase I: Taras Score: 10    Taras Phase II: Taras Score: 10    Last vitals: Reviewed and per EMR flowsheets.        Anesthesia Post Evaluation    Patient location during evaluation: PACU  Patient participation: complete - patient participated  Level of consciousness: awake  Pain score: 1  Airway patency: patent  Nausea & Vomiting: no nausea and no vomiting  Complications: no  Cardiovascular status: blood pressure returned to baseline and hemodynamically stable  Respiratory status: acceptable  Hydration status: euvolemic

## 2019-09-17 NOTE — PROGRESS NOTES
[facility]  Progress Note                   Date:   9/17/2019  Patient name: Julio Chavez  Date of admission:  9/13/2019  2:32 AM  MRN:   [id number]  YOB: 1970  PCP: KODY Ace CNP    Reason for Admission: GI bleed [K92.2]    Subjective:       Clinical Changes /Abnormalities: Seen & examined in room, guard at bedside. no new issues overnight. HGB stable. Plans for EGD today. Echo reviewed. No further NSVT/tachycardia. Review of Systems    Medications:   Scheduled Meds:   metoprolol tartrate  50 mg Oral BID    divalproex  500 mg Oral 2 times per day    sertraline  25 mg Oral Daily    folic acid  1 mg Oral Daily    multivitamin  1 tablet Oral Daily    thiamine  100 mg Oral Daily    pantoprazole  40 mg Intravenous BID    And    sodium chloride (PF)  10 mL Intravenous BID    sodium chloride flush  10 mL Intravenous 2 times per day    nicotine  1 patch Transdermal Daily     Continuous Infusions:   sodium chloride 20 mL/hr at 09/17/19 0901     CBC:   Recent Labs     09/16/19  0726 09/16/19  1951 09/17/19  0530   WBC  --   --  5.1   HGB 14.0 13.9 13.9   PLT  --   --  See Reflexed IPF Result     BMP:    Recent Labs     09/16/19  1308      K 3.8      CO2 24   BUN 9   CREATININE 0.51*   GLUCOSE 79     Hepatic:  Recent Labs     09/15/19  0524 09/16/19  0334 09/17/19  0530   * 95* 104*   * 102* 113*   BILITOT 1.21* 0.72 0.51   ALKPHOS 105 108 96     Troponin: No results for input(s): TROPHS in the last 72 hours. BNP: No results for input(s): BNP in the last 72 hours. Lipids:   No results for input(s): CHOL, HDL in the last 72 hours.     Invalid input(s): LDLCALCU  INR:   Recent Labs     09/15/19  0524 09/16/19  0308 09/17/19  0530   INR 1.0 1.0 1.0       Objective:   Vitals: /79   Pulse 67   Temp 97.8 °F (36.6 °C) (Infrared)   Resp 16   Ht 6' (1.829 m)   Wt 184 lb 11.9 oz (83.8 kg)   SpO2 99%   BMI 25.06 kg/m²   General

## 2019-09-17 NOTE — DISCHARGE SUMMARY
[facility]    [facility] Summary     Patient ID: Tom Wilcox  :  1970   MRN: [id number]     ACCOUNT:  [de-identified]   Patient's PCP: KODY Noble CNP  Admit Date: 2019   Discharge Date: 2019     Length of Stay: 4  Code Status:  Full Code  Admitting Physician: Kiarra Zabala MD  Discharge Physician: Wallace Gamez MD     Active Discharge Diagnoses:     Hospital Problem Lists:  Principal Problem (Resolved):    GI bleed  Active Problems:    Hypertension    Alcoholism ([facility]    Tobacco abuse    Seizure disorder ([facility]    NSVT (nonsustained ventricular tachycardia) ([facility]    Suicidal ideation    Bipolar 1 disorder, depressed, severe ([facility]    Alcohol use disorder, severe, dependence ([facility]        Discharged Condition: stable    Hospital Stay:     Hospital Course:    Ken Deng a 44 y.o. M with hx of HTN, anxiety, depression who presented with loss of consciousness. States he woke up on floor and believes he had a seizure episode. No loss of bowel/bladder control. Called EMS and was taken to ER. Has history of seizure disorder, non compliant with home medications.      In [place] ER noted to have nausea/vomiting and dark stools. Occult blood positive, started on protonix, octreotide, transferred to [facility]. History of daily alcohol use and tobacco use. At [facility] also expressed suicidal ideation, guard placed at bedside. History of depression on chart review.      Initial plans for EGD, held due to NSVT. Cardiology consulted for clearance. Psych consulted for SI, recommending BHI. Cardiology recommended echocardiogram.  It was done showed normal LV function. Patient then underwent EGD which showed IMPRESSION:     No signs of active bleeding. No high risk lesions. No residual blood.    1) No esophagitis, no esophageal varices present. Regular Z-line and GEJ  2) Very mild erosive antritis, likely source of prior bleeding.  Low risk. 3) Subtle evidence of portal hypertensive gastropathy. 4) No large ulcerations, no gastric varices, no ectopic varices, no large erosions, no MWT. 5) Mild duodenitis            RECOMMENDATIONS:   1) Transition patient to PO protonix 40mg daily. CLD for now, advance as tolerated. 2) Avoid alcohol, smoking, NSAIDs. Patient will need alcohol counseling and referral to dependency program. Return patient back to medical floor. Patient may follow up in GI clinic for path results. GI to s/o. Patient's hemoglobin was stable. He did not have any more bleeding. Neurology evaluated him for seizure and recommended continuing him on Depakote. Patient was discharged to [facility] in a stable condition. Physical examination    Respiratory exam: Bilateral air entry no rhonchi or wheezes  Cardiovascular examination: S1, S2  Abdominal examination: Soft, nontender, bowel sounds present  Extremities: nontender, no edema    Significant therapeutic interventions: As above    Significant Diagnostic Studies:   Labs / Micro:  CBC:   Lab Results   Component Value Date    WBC 5.1 09/17/2019    RBC 4.06 09/17/2019    HGB 13.9 09/17/2019    HCT 42.0 09/17/2019    .4 09/17/2019    MCH 34.2 09/17/2019    MCHC 33.1 09/17/2019    RDW 12.9 09/17/2019    PLT See Reflexed IPF Result 09/17/2019     BMP:    Lab Results   Component Value Date    GLUCOSE 79 09/16/2019     09/16/2019    K 3.8 09/16/2019     09/16/2019    CO2 24 09/16/2019    ANIONGAP 12 09/16/2019    BUN 9 09/16/2019    CREATININE 0.51 09/16/2019    BUNCRER NOT REPORTED 09/16/2019    CALCIUM 9.4 09/16/2019    LABGLOM >60 09/16/2019    GFRAA >60 09/16/2019    GFR      09/16/2019    GFR NOT REPORTED 09/16/2019        Radiology:  Xr Chest Standard (2 Vw)    Result Date: 9/12/2019  No acute cardiopulmonary process. Findings of COPD. Ct Head Wo Contrast    Result Date: 9/12/2019  No acute intracranial abnormality.  No fracture or malalignment of the cervical spine. Ct Cervical Spine Wo Contrast    Result Date: 9/12/2019  No acute intracranial abnormality. No fracture or malalignment of the cervical spine. Ct Abdomen Pelvis W Iv Contrast Additional Contrast? None    Result Date: 9/12/2019  No evidence for acute intra-abdominal or intrapelvic pathology. No bowel obstruction or inflammation. No free intraperitoneal air or fluid. No evidence for urinary obstruction. Normal appendix. Severe hepatic steatosis and findings compatible with portal venous hypertension. Us Abdomen Limited    Result Date: 9/13/2019  Coarsely increased echogenicity of the liver, finding suggesting diffuse hepatocellular disease to include at least a component of fatty infiltration. Consultations:    Consults:     Final Specialist Recommendations/Findings:   IP CONSULT TO GI  IP CONSULT TO NEUROLOGY  IP CONSULT TO SPIRITUAL SERVICES  IP CONSULT TO SOCIAL WORK  IP CONSULT TO PSYCHIATRY  IP CONSULT TO CARDIOLOGY      The patient was seen and examined on day of discharge and this discharge summary is in conjunction with any daily progress note from day of discharge.     Discharge plan:     Disposition: BHI    Physician Follow Up:     KODY Masterson CNP  [address]  [phone]    In 1 week      Naye Bridges MD  [address]  [facility]  [address] [facility]  [phone]    In 2 weeks      Dagmar Mckoy MD  [address]  [phone]    In 3 weeks       PCP and GI to follow biopsy report      Diet: cardiac diet    Activity: As tolerated    Instructions to Patient: Alcohol cessation advised    Discharge Medications:      Medication List      START taking these medications    metoprolol succinate 25 MG extended release tablet  Commonly known as:  TOPROL XL  Take 1 tablet by mouth daily Hold for heart rate less than 60 or systolic bp less than 384     sertraline 25 MG tablet  Commonly known as:  ZOLOFT  Take 1

## 2019-09-17 NOTE — CARE COORDINATION
Discharge [facility] Case Management Department  Written by: Bobby Garrido RN    Patient Name: Tom Wilcox  Attending Provider: Kai Alfred MD  Admit Date: 2019  2:32 AM  MRN: [id number]  Account: [de-identified]                     : 1970  Discharge Date: 19      Disposition: [facility] per Raciel Solis RN

## 2019-09-18 LAB — SURGICAL PATHOLOGY REPORT: NORMAL

## 2019-09-18 PROCEDURE — 90792 PSYCH DIAG EVAL W/MED SRVCS: CPT | Performed by: PSYCHIATRY & NEUROLOGY

## 2019-09-18 PROCEDURE — 1240000000 HC EMOTIONAL WELLNESS R&B

## 2019-09-18 PROCEDURE — 6370000000 HC RX 637 (ALT 250 FOR IP): Performed by: INTERNAL MEDICINE

## 2019-09-18 RX ORDER — ALBUTEROL SULFATE 90 UG/1
2 AEROSOL, METERED RESPIRATORY (INHALATION) EVERY 6 HOURS PRN
Status: DISCONTINUED | OUTPATIENT
Start: 2019-09-18 | End: 2019-09-27 | Stop reason: HOSPADM

## 2019-09-18 RX ORDER — MINERAL OIL AND WHITE PETROLATUM 150; 830 MG/G; MG/G
OINTMENT OPHTHALMIC PRN
Status: DISCONTINUED | OUTPATIENT
Start: 2019-09-18 | End: 2019-09-27 | Stop reason: HOSPADM

## 2019-09-18 RX ADMIN — PANTOPRAZOLE SODIUM 40 MG: 40 TABLET, DELAYED RELEASE ORAL at 09:09

## 2019-09-18 RX ADMIN — DIVALPROEX SODIUM 500 MG: 500 TABLET, DELAYED RELEASE ORAL at 22:03

## 2019-09-18 RX ADMIN — SERTRALINE HYDROCHLORIDE 25 MG: 25 TABLET ORAL at 09:10

## 2019-09-18 RX ADMIN — MULTIPLE VITAMINS W/ MINERALS TAB 1 TABLET: TAB at 09:09

## 2019-09-18 RX ADMIN — FOLIC ACID 1 MG: 1 TABLET ORAL at 09:09

## 2019-09-18 RX ADMIN — THIAMINE HCL TAB 100 MG 100 MG: 100 TAB at 09:09

## 2019-09-18 RX ADMIN — METOPROLOL SUCCINATE 25 MG: 25 TABLET, EXTENDED RELEASE ORAL at 09:09

## 2019-09-18 RX ADMIN — DIVALPROEX SODIUM 500 MG: 500 TABLET, DELAYED RELEASE ORAL at 09:09

## 2019-09-18 ASSESSMENT — SLEEP AND FATIGUE QUESTIONNAIRES
DIFFICULTY STAYING ASLEEP: NO
RESTFUL SLEEP: YES
DO YOU HAVE DIFFICULTY SLEEPING: NO
DO YOU USE A SLEEP AID: NO
DIFFICULTY FALLING ASLEEP: NO
SLEEP PATTERN: NORMAL
AVERAGE NUMBER OF SLEEP HOURS: 7
DIFFICULTY ARISING: NO

## 2019-09-18 ASSESSMENT — PAIN SCALES - GENERAL: PAINLEVEL_OUTOF10: 0

## 2019-09-18 ASSESSMENT — LIFESTYLE VARIABLES: HISTORY_ALCOHOL_USE: YES

## 2019-09-18 NOTE — PROGRESS NOTES
disorder, most recent episode depressed ([facility]          TREATMENT:    · The patient is admitted with suicide precautions. · The patient gave informed consent to the following plan after discussing the risks, benefits, alternatives and side effects. .  · We will continue Depakote for seizure disorder and may help avoid alcohol drinking. · He was started on sertraline 25 mg daily. Will increase to 50 mg daily. Risk Management:  close watch per standard protocol      Psychotherapy:  participation in milieu and group and individual sessions with Attending Physician,  and Physician Assistant/CNP    Reason for Admission to Psychiatric Unit:  Threat to self requiring 24 hour professional observation      Estimated length of stay:  5-10 days      GENERAL PATIENT/FAMILY EDUCATION  Patient will understand basic signs and symptoms, Patient will understand benefits/risks and potential side effects from proposed meds and Patient will understand their role in recovery. Family is  active in patient's care. Patient assets that may be helpful during treatment include: Intent to participate and engage in treatment, sufficient fund of knowledge and intellect to understand and utilize treatments.            Physicians Signature:  Electronically signed by Aleta El MD, on 9/18/2019 at 5:53 PM

## 2019-09-18 NOTE — CARE COORDINATION
BHI Biopsychosocial Assessment    Current Level of Psychosocial Functioning     Independent   Dependent    Minimal Assist x    Comments:   PT has a principle diagnosis of Bipolar I Disorder, depressive type; prior history of Wernicke-Korsakoff psychosis; Alcohol Use Disorder; Seizure Disorder    Psychosocial High Risk Factors (check all that apply)    Unable to obtain meds   Chronic illness/pain    Substance abuse X  Lack of Family Support X  Financial stress   Isolation  X  Inadequate Community Resources X  Suicide attempt(s) X  Not taking medications X  Victim of crime   Developmental Delay  Unable to manage personal needs    Age 72 or older   Homeless  No transportation   Readmission within 30 days  Unemployment  Traumatic Event X    Psychiatric Advanced Directives: Nothing reported    Family to Involve in Treatment: Not at this time    Sexual Orientation:  PT is currently single    Patient Strengths: Medicaid; SSI; safe housing    Patient Barriers: Alcohol abuse    Opioids/AOD Referral and/or Education Provided:  Referral will be made for CD counselor    CMHC/mental health history: Russ in [facility]   medication management, group/individual therapies, family meetings, psycho -education, treatment team meetings to assist with stabilization    Initial Discharge Plan:  Stabilize mood and medications; return to home in [place]; follow-up appointment; [employer] transportation    Clinical Summary:  The patient is a 44 y.o. male who is admitted medically for treatment of possible seizure, nausea and vomiting with hematemesis, suicidal ideation. PT was initially treated in the ED at [place] [address] on 9/12/2019, stated he woke up on the floor at home and his sister called EMS. [facility] and reported to staff thoughts of suicide. He reported a history of seizures and noncompliance with medications.  The record indicates he also has a history of alcohol abuse and had been on a heavy alcohol binge for approximately 3 days. He reported drinking 12 twenty four ounce cans of beer daily and stated he occasionally uses marijuana. He reported drinking heavily for several years. He made a comment to providers during this hospitalization \"I would be better off dead because life is hard and it just sucks. \"  He denied any current suicidal plan or intent. Patient also reported that he has a history of bipolar disorder and his most recent medications were BuSpar, Haldol, Cogentin. When interviewed, the patient is cooperative, but frequently provides vague responses to questions and sometimes states \"I don't know. \" He also occasionally laughs nervously. When asked about recent symptoms of depression, he states he has some issues with his sister, with whom he resides. He indicates that his sister is critical of him and that he depends on her financially. He denies any current intent or plan to harm himself, but does state within the past couple of weeks \"I've thought about it. \"  That he [place] by the river a few times recently and admits that he had thoughts of possibly jumping in. He states that he has some difficulty sleeping at times, generally stays at home, indicates that his energy level recently has been down and states he does not have any interest in any activities \"because of my eyesight. \"  He also endorses some difficulty concentrating at times. He mentions that his daughter lives in [facility] and therefore he has limited contact, and this is a stressor. When asked specifically about his current psychiatric medications, patient states he generally takes them, but he has previously reported history of noncompliance. Notably, he does not see a psychiatrist.  He sees a counselor in the office of his PCP, who makes recommendations for psychiatric medications to his physician.   Patient is not currently taking an antidepressant and reportedly has a history

## 2019-09-18 NOTE — GROUP NOTE
Group Therapy Note    Date: September 18    Group Start Time: 1100  Group End Time: 1130  Group Topic: Cognitive Skills    [facility] ZORAIDA Santos    Pt did not attend 1100 cognitive  skills group d/t resting in room despite staff invitation to attend. 1:1 talk time offered as alternative to group session, pt declined.          Signature:  Sam Momin

## 2019-09-18 NOTE — H&P
HISTORY and [facility] [address]       NAME:  Per Alcaraz  MRN: [id number]   YOB: 1970   Date: 9/18/2019   Age: 44 y.o. Gender: male     H&P Update Note    H&P from 09/13/2019  reviewed and updated, Patient examined. Vitals: BP (!) 117/57   Pulse 82   Temp 98 °F (36.7 °C) (Oral)   Resp 14   Ht 6' (1.829 m)   Wt 187 lb (84.8 kg)   BMI 25.36 kg/m²  Body mass index is 25.36 kg/m². Per Alcaraz is 44 y.o.,  male, admitted because of depression/ Bipolar Disorder. Pt has suicidal ideation, she was hopeless see a reason to live anymore. Pt has plans to drown himself, jump off a bridge. Pt denies any homicidal ideation. Pt has no history of previous suicide attempts. Pt feels hopeless, helpless, worthless, lack of interest, loss of energy. Poor insight. Pt has poor sleep, fair appetite, poor concentration and memory. She has auditory and visual hallucinations, sees objects and animals at the corner of his eye, hears voices that tell him to hurt himself and wife is still alive the voices do not tell him to hurt anybody else. Patient patient is occasional drinker occasional marijuana user, lives with his sister on a temporary basis, patient is currently not working apply for disability. Pt has been compliant with the psychiatric medications. I concur with the findings. PROVISIONAL DIAGNOSES / SURGERY:      Bipolar Disorder.      Patient Active Problem List    Diagnosis Date Noted    Bipolar I disorder, most recent episode depressed ([address] 09/17/2019    Seizure disorder ([address] 09/13/2019    NSVT (nonsustained ventricular tachycardia) ([address] 09/13/2019    Suicidal ideation 09/13/2019    Bipolar 1 disorder, depressed, severe ([address]     Alcohol use disorder, severe, dependence ([address]     Wernicke-Korsakoff psychosis 11/08/2014    Hypertension 11/08/2014    Alcoholism ([address] 11/08/2014    Malnutrition ([address] 11/08/2014    Tobacco abuse 11/08/2014 negative joint pains, muscle aches, swelling of joints  NEUROLOGICAL:  Positive for seizures   BEHAVIOR/PSYCH:  Positive for suicidal ideation     Physical Exam:   BP (!) 117/57   Pulse 82   Temp 98 °F (36.7 °C) (Oral)   Resp 14   Ht 6' (1.829 m)   Wt 187 lb (84.8 kg)   BMI 25.36 kg/m²   Temp (24hrs), Av °F (36.7 °C), Min:98 °F (36.7 °C), Max:98 °F (36.7 °C)    No results for input(s): POCGLU in the last 72 hours. No intake or output data in the 24 hours ending 19 1806    General Appearance: alert, no acute distress  Mental status: oriented to person, place, and time  Head: normocephalic, atraumatic  Eye: no icterus, redness, pupils equal and reactive, extraocular eye movements intact, conjunctiva clear  Ear: normal external ear, no discharge, hearing intact  Nose: no drainage noted  Mouth: mucous membranes moist  Neck: supple, no carotid bruits  Lungs: Bilateral equal air entry, clear to ausculation, no wheezing  Cardiovascular: normal rate, regular rhythm  Abdomen: Soft, nontender, nondistended, normal bowel sounds  Neurologic: There are no new focal motor or sensory deficits, normal muscle tone and bulk, no abnormal sensation, normal speech, cranial nerves II through XII grossly intact  Skin: No gross lesions, rashes, bruising or bleeding on exposed skin area  Extremities: peripheral pulses palpable, no edema or calf pain with palpation    Investigations:      Laboratory Testing:  No results found for this or any previous visit (from the past 24 hour(s)). Imaging/Diagnostics:  Xr Chest Standard (2 Vw)    Result Date: 2019  No acute cardiopulmonary process. Findings of COPD. Ct Head Wo Contrast    Result Date: 2019  No acute intracranial abnormality. No fracture or malalignment of the cervical spine. Ct Cervical Spine Wo Contrast    Result Date: 2019  No acute intracranial abnormality. No fracture or malalignment of the cervical spine.      Ct Abdomen Pelvis W Iv Contrast

## 2019-09-19 PROCEDURE — 1240000000 HC EMOTIONAL WELLNESS R&B

## 2019-09-19 PROCEDURE — 6370000000 HC RX 637 (ALT 250 FOR IP): Performed by: INTERNAL MEDICINE

## 2019-09-19 PROCEDURE — 6370000000 HC RX 637 (ALT 250 FOR IP): Performed by: PSYCHIATRY & NEUROLOGY

## 2019-09-19 PROCEDURE — 99232 SBSQ HOSP IP/OBS MODERATE 35: CPT | Performed by: REGISTERED NURSE

## 2019-09-19 RX ADMIN — MULTIPLE VITAMINS W/ MINERALS TAB 1 TABLET: TAB at 08:32

## 2019-09-19 RX ADMIN — METOPROLOL SUCCINATE 25 MG: 25 TABLET, EXTENDED RELEASE ORAL at 08:31

## 2019-09-19 RX ADMIN — DIVALPROEX SODIUM 500 MG: 500 TABLET, DELAYED RELEASE ORAL at 22:11

## 2019-09-19 RX ADMIN — THIAMINE HCL TAB 100 MG 100 MG: 100 TAB at 08:32

## 2019-09-19 RX ADMIN — FOLIC ACID 1 MG: 1 TABLET ORAL at 08:32

## 2019-09-19 RX ADMIN — SERTRALINE HYDROCHLORIDE 50 MG: 50 TABLET ORAL at 08:32

## 2019-09-19 RX ADMIN — DIVALPROEX SODIUM 500 MG: 500 TABLET, DELAYED RELEASE ORAL at 08:32

## 2019-09-19 RX ADMIN — PANTOPRAZOLE SODIUM 40 MG: 40 TABLET, DELAYED RELEASE ORAL at 08:31

## 2019-09-19 ASSESSMENT — PAIN SCALES - GENERAL: PAINLEVEL_OUTOF10: 0

## 2019-09-19 NOTE — PROGRESS NOTES
Department of Psychiatry  Attending Progress Note  Chief Complaint: Bipolar 1 disorder, depressed, severe ([address]     SUBJECTIVE: Brandt Mayes is seen in treatment team meeting. He reports that he does not have any goals for short-term or long-term. We discussed the alcohol use and the physical consequences. He states he is not sure if he is ready to quit drinking or not. He is very flat. He is reclusive to his room most of the shifts according to nursing. He has minimal interactions with his peers. He does attend groups. He is extremely disheveled. Reports feeling hopeless at times about situation and cannot contract for safety outside of hospital setting. Explored his feelings and concerns. Reassurance and support provided. Charting and medications reviewed. There is no identifiable safe alternative other than continued hospitalization.        OBJECTIVE    Physical  /81   Pulse 67   Temp 97.8 °F (36.6 °C) (Oral)   Resp 14   Ht 6' (1.829 m)   Wt 187 lb (84.8 kg)   BMI 25.36 kg/m²      Mental Status Evaluation:  Orientation: alertness: alert   Mood:. decreased range      Affect:  constricted      Appearance:  disheveled   Activity:  Psychomotor Retardation   Gait/Posture: Normal   Speech:  normal pitch and normal volume   Thought Process:  circumstantial   Thought Content:  depressed   Sensorium:  person, place, time/date and situation   Cognition:  intact   Memory: intact   Insight:  limited   Judgment: limited   Suicidal Ideations: denies suicidal ideation   Homicidal Ideations: Negative for homicidal ideation      Medication Side Effects: absent       Attention Span attention span appeared shorter than expected for age       Labs  Recent Results (from the past 67 hour(s))   Hemoglobin    Collection Time: 09/16/19  7:51 PM   Result Value Ref Range    Hemoglobin 13.9 13.0 - 17.0 g/dL   HEPATIC FUNCTION PANEL    Collection Time: 09/17/19  5:30 AM   Result Value Ref Range    Alb 3.6 3.5 - 5.2 g/dL acetaminophen (TYLENOL) tablet 650 mg  650 mg Oral Q4H PRN Becky Garcia MD        divalproex (DEPAKOTE) DR tablet 500 mg  500 mg Oral 2 times per day Becky Garcia MD   500 mg at 09/19/19 7154    metoprolol succinate (TOPROL XL) extended release tablet 25 mg  25 mg Oral Daily Becky Garcia MD   25 mg at 09/19/19 0831    nicotine (NICODERM CQ) 21 MG/24HR 1 patch  1 patch Transdermal Daily Becky Garcia MD   1 patch at 09/18/19 0908    pantoprazole (PROTONIX) tablet 40 mg  40 mg Oral QAM AC Becky Garcia MD   40 mg at 09/19/19 0831         sertraline  50 mg Oral Daily    folic acid  1 mg Oral Daily    therapeutic multivitamin-minerals  1 tablet Oral Daily    thiamine  100 mg Oral Daily    divalproex  500 mg Oral 2 times per day    metoprolol succinate  25 mg Oral Daily    nicotine  1 patch Transdermal Daily    pantoprazole  40 mg Oral QAM AC       ASSESSMENT  Bipolar 1 disorder, depressed, severe (HCC)     Patient's Response to Treatment: positive    PLAN  · Continue inpatient psychiatric treatment  · Supportive therapy with medication management. Reviewed risks and benefits as well as potential side effects with patient. · Continue Depakote for seizure disorder and may help avoid alcohol drinking. · Continue Zoloft 50 mg daily. · Therapeutic activities and groups  · Follow up at [facility] after symptoms stabilized           Electronically signed by KODY Darling on 9/19/2019 at 5:44 PM.    Dragon voice recognition software used in portions of this document.

## 2019-09-19 NOTE — GROUP NOTE
Group Therapy Note    Date: September 19    Group Start Time: 1000  Group End Time: 8909  Group Topic: Psychotherapy    [facility] D    Carrie Snider        Group Therapy Note      Patient refused to attend psychotherapy group after encouragement from staff. 1:1 talk time offered but refused. Signature:   Carrie Snider

## 2019-09-19 NOTE — GROUP NOTE
Group Therapy Note     Date: September 19     Group Start Time: 900AM  Group End Time: 945AM  Group Topic: Community meeting     [facility] D    Yasmeen JOYS     Pt did not attend 900am Community meeting/goals group d/t resting in room despite staff invitation to attend. Talk time offered as alternative to group session, pt declined.          Signature: Yasmeen CONWAY

## 2019-09-20 PROCEDURE — 1240000000 HC EMOTIONAL WELLNESS R&B

## 2019-09-20 PROCEDURE — 99232 SBSQ HOSP IP/OBS MODERATE 35: CPT | Performed by: REGISTERED NURSE

## 2019-09-20 PROCEDURE — 6370000000 HC RX 637 (ALT 250 FOR IP): Performed by: INTERNAL MEDICINE

## 2019-09-20 PROCEDURE — 6370000000 HC RX 637 (ALT 250 FOR IP): Performed by: PSYCHIATRY & NEUROLOGY

## 2019-09-20 RX ADMIN — PANTOPRAZOLE SODIUM 40 MG: 40 TABLET, DELAYED RELEASE ORAL at 07:11

## 2019-09-20 RX ADMIN — MULTIPLE VITAMINS W/ MINERALS TAB 1 TABLET: TAB at 08:48

## 2019-09-20 RX ADMIN — METOPROLOL SUCCINATE 25 MG: 25 TABLET, EXTENDED RELEASE ORAL at 08:48

## 2019-09-20 RX ADMIN — THIAMINE HCL TAB 100 MG 100 MG: 100 TAB at 08:48

## 2019-09-20 RX ADMIN — FOLIC ACID 1 MG: 1 TABLET ORAL at 08:48

## 2019-09-20 RX ADMIN — DIVALPROEX SODIUM 500 MG: 500 TABLET, DELAYED RELEASE ORAL at 08:48

## 2019-09-20 RX ADMIN — DIVALPROEX SODIUM 500 MG: 500 TABLET, DELAYED RELEASE ORAL at 22:02

## 2019-09-20 RX ADMIN — SERTRALINE HYDROCHLORIDE 50 MG: 50 TABLET ORAL at 08:48

## 2019-09-20 NOTE — BH NOTE
Group Therapy Note     Date: September 20  Group Start Time: 1430  Group End Time: 8942  Group Topic: Cognitive Skills     [facility] D    Margarito Mansfieldisela, [address]           Group Therapy Note     Attendees:   7/18        Patient's Goal: To increase social interaction and to practice problem solving, work on improving thought processes using word association strategies     Notes: Pt participated fully in group and was able to practice problem solving and improving thought processes . Pt was pleasant and supportive of peers      Status After Intervention:  Improved     Participation Level:  Active Listener and Interactive     Participation Quality: Appropriate, Attentive, Sharing         Speech:   Normal     Thought Process/Content: Logical,linear     Affective Functioning: Congruent     Mood: euthymic         Level of consciousness:  Alert, and Attentive        Response to Learning: Able to verbalize current knowledge/experience, Able to verbalize/acknowledge new learning, and Progressing to goal        Endings: None Reported     Modes of Intervention: Education, Support, Socialization, Exploration, Clarifying and Problem-solving        Discipline Responsible: Psychoeducational Specialist        Signature:  Danni Vargas

## 2019-09-20 NOTE — PROGRESS NOTES
Department of Psychiatry  Attending Progress Note  Chief Complaint: Bipolar 1 disorder, depressed, severe ([address]     SUBJECTIVE: Alfonzo Bee is seen in his room today in bed and reported that his sleep was restless last night, that he tossed and turned. He reports that he was up for breakfast. He reports that he does not have any goals for short-term or long-term, writer encouraged him to attend meetings today and focus on setting goals. He denies SI/HI. We discussed the alcohol use and the physical consequences. He states he is not sure if he is ready to quit drinking or not. Writer observed generalized red marks on bilateral arms, writer asked Alfonzo Bee what the de dios on his arms are from and he responded that he did not know his arms itch. He is very flat. He is reclusive to his room most of the shifts according to nursing. He has minimal interactions with his peers. He does attend groups. He is extremely disheveled. Reports feeling hopeless at times about situation and cannot contract for safety outside of hospital setting. Explored his feelings and concerns. Reassurance and support provided. Charting and medications reviewed. There is no identifiable safe alternative other than continued hospitalization.        OBJECTIVE    Physical  BP 99/64   Pulse 88   Temp 98.4 °F (36.9 °C) (Oral)   Resp 14   Ht 6' (1.829 m)   Wt 187 lb (84.8 kg)   BMI 25.36 kg/m²      Mental Status Evaluation:  Orientation: alertness: alert   Mood:. decreased range      Affect:  constricted      Appearance:  disheveled   Activity:  Psychomotor Retardation   Gait/Posture: Normal   Speech:  normal pitch and normal volume   Thought Process:  circumstantial   Thought Content:  depressed   Sensorium:  person, place, time/date and situation   Cognition:  intact   Memory: intact   Insight:  limited   Judgment: limited   Suicidal Ideations: denies suicidal ideation   Homicidal Ideations: Negative for homicidal ideation      Medication Side

## 2019-09-21 PROCEDURE — 1240000000 HC EMOTIONAL WELLNESS R&B

## 2019-09-21 PROCEDURE — 99232 SBSQ HOSP IP/OBS MODERATE 35: CPT | Performed by: PSYCHIATRY & NEUROLOGY

## 2019-09-21 PROCEDURE — 6370000000 HC RX 637 (ALT 250 FOR IP): Performed by: INTERNAL MEDICINE

## 2019-09-21 PROCEDURE — 6370000000 HC RX 637 (ALT 250 FOR IP): Performed by: PSYCHIATRY & NEUROLOGY

## 2019-09-21 RX ADMIN — METOPROLOL SUCCINATE 25 MG: 25 TABLET, EXTENDED RELEASE ORAL at 09:06

## 2019-09-21 RX ADMIN — MULTIPLE VITAMINS W/ MINERALS TAB 1 TABLET: TAB at 09:06

## 2019-09-21 RX ADMIN — FOLIC ACID 1 MG: 1 TABLET ORAL at 09:06

## 2019-09-21 RX ADMIN — THIAMINE HCL TAB 100 MG 100 MG: 100 TAB at 09:06

## 2019-09-21 RX ADMIN — SERTRALINE HYDROCHLORIDE 50 MG: 50 TABLET ORAL at 09:06

## 2019-09-21 RX ADMIN — DIVALPROEX SODIUM 500 MG: 500 TABLET, DELAYED RELEASE ORAL at 09:06

## 2019-09-21 RX ADMIN — PANTOPRAZOLE SODIUM 40 MG: 40 TABLET, DELAYED RELEASE ORAL at 09:44

## 2019-09-21 RX ADMIN — DIVALPROEX SODIUM 500 MG: 500 TABLET, DELAYED RELEASE ORAL at 21:32

## 2019-09-21 ASSESSMENT — PAIN SCALES - GENERAL: PAINLEVEL_OUTOF10: 5

## 2019-09-21 NOTE — GROUP NOTE
Group Therapy Note    Date: September 21    Group Start Time: 1600  Group End Time: 1630  Group Topic: Healthy Living/Wellness    [facility]    [place] VIVEK Renae; Rosalina Ye RN        Group Therapy Note    Attendees: 10         Status After Intervention:  Unchanged    Participation Level: Minimal    Participation Quality: Supportive      Speech:  hesitant      Thought Process/Content: Linear      Affective Functioning: Blunted      Mood: anxious      Level of consciousness:  Alert and Oriented x4      Response to Learning: Progressing to goal      Endings: None Reported    Modes of Intervention: Education, Socialization and Activity      Discipline Responsible: Registered Nurse      Signature:  Rosalina Ye RN

## 2019-09-22 PROCEDURE — 6370000000 HC RX 637 (ALT 250 FOR IP): Performed by: INTERNAL MEDICINE

## 2019-09-22 PROCEDURE — 6370000000 HC RX 637 (ALT 250 FOR IP): Performed by: PSYCHIATRY & NEUROLOGY

## 2019-09-22 PROCEDURE — 99232 SBSQ HOSP IP/OBS MODERATE 35: CPT | Performed by: NURSE PRACTITIONER

## 2019-09-22 PROCEDURE — 1240000000 HC EMOTIONAL WELLNESS R&B

## 2019-09-22 RX ORDER — PANTOPRAZOLE SODIUM 40 MG/1
40 TABLET, DELAYED RELEASE ORAL
Status: DISCONTINUED | OUTPATIENT
Start: 2019-09-22 | End: 2019-09-27 | Stop reason: HOSPADM

## 2019-09-22 RX ORDER — TRAZODONE HYDROCHLORIDE 50 MG/1
50 TABLET ORAL NIGHTLY PRN
Status: DISCONTINUED | OUTPATIENT
Start: 2019-09-22 | End: 2019-09-27 | Stop reason: HOSPADM

## 2019-09-22 RX ORDER — HYDROXYZINE HYDROCHLORIDE 25 MG/1
25 TABLET, FILM COATED ORAL 3 TIMES DAILY PRN
Status: DISCONTINUED | OUTPATIENT
Start: 2019-09-22 | End: 2019-09-27 | Stop reason: HOSPADM

## 2019-09-22 RX ADMIN — FOLIC ACID 1 MG: 1 TABLET ORAL at 08:14

## 2019-09-22 RX ADMIN — DIVALPROEX SODIUM 500 MG: 500 TABLET, DELAYED RELEASE ORAL at 21:09

## 2019-09-22 RX ADMIN — THIAMINE HCL TAB 100 MG 100 MG: 100 TAB at 08:15

## 2019-09-22 RX ADMIN — SERTRALINE HYDROCHLORIDE 50 MG: 50 TABLET ORAL at 08:15

## 2019-09-22 RX ADMIN — MULTIPLE VITAMINS W/ MINERALS TAB 1 TABLET: TAB at 08:15

## 2019-09-22 RX ADMIN — PANTOPRAZOLE SODIUM 40 MG: 40 TABLET, DELAYED RELEASE ORAL at 08:15

## 2019-09-22 RX ADMIN — METOPROLOL SUCCINATE 25 MG: 25 TABLET, EXTENDED RELEASE ORAL at 08:14

## 2019-09-22 RX ADMIN — DIVALPROEX SODIUM 500 MG: 500 TABLET, DELAYED RELEASE ORAL at 08:15

## 2019-09-22 NOTE — PROGRESS NOTES
Department of Psychiatry  Attending Progress Note    Chief Complaint: Bipolar 1 disorder, depressed, severe ([address]     SUBJECTIVE: Isaías Marquez was seen in the day room today. Said he does not feel that his mood is any different from when he was admitted to the hospital.  Continues to feel depressed with intermittent SI. Denies a plan for ending his life, but does not think he could maintain safety outside of the hospital.  Reports occasional headaches and intermittent nausea at night. Denies side effects to medications. Ambivalent about sobriety. OBJECTIVE    Physical  /73   Pulse 81   Temp 98 °F (36.7 °C) (Oral)   Resp 14   Ht 6' (1.829 m)   Wt 187 lb (84.8 kg)   BMI 25.36 kg/m²      Mental Status Evaluation:  Orientation: alertness: alert   Mood:. \"the same\"      Affect:  constricted      Appearance:  disheveled   Activity:  Psychomotor Retardation   Gait/Posture: Normal   Speech:  normal pitch and normal volume   Thought Process:  Circumstantial   Thought Content:  +SI, No overtly delusional thoughts   Sensorium:  person, place, time/date and situation   Cognition:  intact   Memory: intact   Insight:  limited   Judgment: limited   Suicidal Ideations: Reports intermittent suicidal ideation without specific plan   Homicidal Ideations: Negative for homicidal ideation      Medication Side Effects: absent       Attention Span attention span appeared shorter than expected for age       Labs  No results found for this or any previous visit (from the past 67 hour(s)).     Medications  Current Facility-Administered Medications   Medication Dose Route Frequency Provider Last Rate Last Dose    pantoprazole (PROTONIX) tablet 40 mg  40 mg Oral Daily with breakfast Rogerio TIM MD        albuterol sulfate  (90 Base) MCG/ACT inhaler 2 puff  2 puff Inhalation Q6H PRN Primo Mortimer, MD        lubrifresh P.M. (artificial tears) ophthalmic ointment   Ophthalmic PRN Rob Mortimer, MD Cosme Mano

## 2019-09-22 NOTE — PROGRESS NOTES
of Risk for Harm to Self/Others:  None    PLAN  · Continue inpatient psychiatric treatment  · Supportive therapy with medication management. Reviewed risks and benefits as well as potential side effects with patient. · Review medications for efficacy and side effects. · Therapeutic support and empathetic care provided. · Engage in therapeutic activities and groups. · Follow up at [facility] mental health [place] after symptoms stabilized.   · Ordered Depakote level on 9/24/19 AM      Anticipated Discharge Date: TBD    Patient's Response to Treatment: Minimal    Javier Laughter  9/22/2019  11:54 AM

## 2019-09-23 PROCEDURE — 6370000000 HC RX 637 (ALT 250 FOR IP): Performed by: PSYCHIATRY & NEUROLOGY

## 2019-09-23 PROCEDURE — 99232 SBSQ HOSP IP/OBS MODERATE 35: CPT | Performed by: REGISTERED NURSE

## 2019-09-23 PROCEDURE — 6370000000 HC RX 637 (ALT 250 FOR IP): Performed by: INTERNAL MEDICINE

## 2019-09-23 PROCEDURE — 1240000000 HC EMOTIONAL WELLNESS R&B

## 2019-09-23 RX ORDER — DIVALPROEX SODIUM 500 MG/1
500 TABLET, DELAYED RELEASE ORAL EVERY 12 HOURS SCHEDULED
Qty: 28 TABLET | Refills: 0 | Status: SHIPPED | OUTPATIENT
Start: 2019-09-23 | End: 2019-09-27

## 2019-09-23 RX ORDER — HYDROXYZINE HYDROCHLORIDE 25 MG/1
25 TABLET, FILM COATED ORAL 3 TIMES DAILY PRN
Qty: 30 TABLET | Refills: 0 | Status: SHIPPED | OUTPATIENT
Start: 2019-09-23 | End: 2019-09-27 | Stop reason: HOSPADM

## 2019-09-23 RX ORDER — SERTRALINE HYDROCHLORIDE 100 MG/1
100 TABLET, FILM COATED ORAL DAILY
Status: DISCONTINUED | OUTPATIENT
Start: 2019-09-24 | End: 2019-09-25

## 2019-09-23 RX ORDER — TRAZODONE HYDROCHLORIDE 50 MG/1
50 TABLET ORAL NIGHTLY PRN
Qty: 14 TABLET | Refills: 0 | Status: SHIPPED | OUTPATIENT
Start: 2019-09-23 | End: 2019-09-27

## 2019-09-23 RX ORDER — SERTRALINE HYDROCHLORIDE 100 MG/1
100 TABLET, FILM COATED ORAL DAILY
Qty: 14 TABLET | Refills: 0 | Status: SHIPPED | OUTPATIENT
Start: 2019-09-24 | End: 2019-09-27

## 2019-09-23 RX ADMIN — FOLIC ACID 1 MG: 1 TABLET ORAL at 08:09

## 2019-09-23 RX ADMIN — PANTOPRAZOLE SODIUM 40 MG: 40 TABLET, DELAYED RELEASE ORAL at 08:09

## 2019-09-23 RX ADMIN — DIVALPROEX SODIUM 500 MG: 500 TABLET, DELAYED RELEASE ORAL at 22:33

## 2019-09-23 RX ADMIN — SERTRALINE HYDROCHLORIDE 50 MG: 50 TABLET ORAL at 08:09

## 2019-09-23 RX ADMIN — DIVALPROEX SODIUM 500 MG: 500 TABLET, DELAYED RELEASE ORAL at 08:09

## 2019-09-23 RX ADMIN — MULTIPLE VITAMINS W/ MINERALS TAB 1 TABLET: TAB at 08:08

## 2019-09-23 RX ADMIN — THIAMINE HCL TAB 100 MG 100 MG: 100 TAB at 08:08

## 2019-09-23 RX ADMIN — METOPROLOL SUCCINATE 25 MG: 25 TABLET, EXTENDED RELEASE ORAL at 08:08

## 2019-09-23 ASSESSMENT — PAIN DESCRIPTION - ORIENTATION: ORIENTATION: RIGHT;LEFT

## 2019-09-23 ASSESSMENT — PAIN SCALES - GENERAL: PAINLEVEL_OUTOF10: 5

## 2019-09-23 ASSESSMENT — PAIN DESCRIPTION - LOCATION: LOCATION: FOOT;KNEE

## 2019-09-23 ASSESSMENT — PAIN DESCRIPTION - PAIN TYPE: TYPE: ACUTE PAIN

## 2019-09-23 NOTE — GROUP NOTE
Group Therapy Note     Date: September 23     Group Start Time: 0900  Group End Time: 0930  Group Topic: community meeting     [facility] CHRISTY CONWAY     Pt did not attend 0900 community meeting /goals group d/t resting in room despite staff invitation to attend. Talk time offered as alternative to group session, pt declined.          Signature: Grace CONWAY

## 2019-09-23 NOTE — PROGRESS NOTES
homicidal ideation      Medication Side Effects: absent       Attention Span attention span and concentration were age appropriate     Clinical Assessment Medical Decision    Axis I: Bipolar, Depressed        Precautions with Justification:   None    Medication Review/Mgmt: Medications reviewed - Increase Zoloft    Medical Issues: See Chart    Assessment of Risk for Harm to Self/Others:  None    PLAN  · Continue inpatient psychiatric treatment  · Supportive therapy with medication management. Reviewed risks and benefits as well as potential side effects with patient. · Review medications for efficacy and side effects. · Therapeutic support and empathetic care provided. · Engage in therapeutic activities and groups. · Follow up at [facility] after symptoms stabilized. · Ordered Depakote level on 9/24/19 AM  · Increase Zoloft to 100 mg daily.        Anticipated Discharge Date: TBD    Patient's Response to Treatment: Minimal    Renuka Cabrales  9/23/2019  2:05 PM

## 2019-09-23 NOTE — GROUP NOTE
Group Therapy Note     Date: September 23     Group Start Time: 1100  Group End Time:1145  Group Topic:  cognitive skills       [facility] CHRISTY Hernandez, CTRS        Group Therapy Note     Attendees:         Patient's Goal:  To improve goal setting skills      Notes:   Pt participated and was pleasant     Status After Intervention:  Improved     Participation Level:  Active Listener     Participation Quality: Appropriate and Attentive        Speech:  normal        Thought Process/Content: Logical        Affective Functioning: Congruent           Level of consciousness:  Alert and Oriented x4        Response to Learning: Able to verbalize current knowledge/experience and Progressing to goal        Endings: None Reported     Modes of Intervention: Education and Support        Discipline Responsible: Psychoeducational Specialist        Signature:  Fab Murray

## 2019-09-24 PROBLEM — K29.70 GASTRITIS: Status: ACTIVE | Noted: 2019-09-24

## 2019-09-24 LAB
VALPROIC ACID LEVEL: 46 UG/ML (ref 50–125)
VALPROIC DATE LAST DOSE: ABNORMAL
VALPROIC DOSE AMOUNT: ABNORMAL
VALPROIC TIME LAST DOSE: 2233

## 2019-09-24 PROCEDURE — 1240000000 HC EMOTIONAL WELLNESS R&B

## 2019-09-24 PROCEDURE — 80164 ASSAY DIPROPYLACETIC ACD TOT: CPT

## 2019-09-24 PROCEDURE — 6370000000 HC RX 637 (ALT 250 FOR IP): Performed by: REGISTERED NURSE

## 2019-09-24 PROCEDURE — 36415 COLL VENOUS BLD VENIPUNCTURE: CPT

## 2019-09-24 PROCEDURE — 6370000000 HC RX 637 (ALT 250 FOR IP): Performed by: PSYCHIATRY & NEUROLOGY

## 2019-09-24 PROCEDURE — 99232 SBSQ HOSP IP/OBS MODERATE 35: CPT | Performed by: REGISTERED NURSE

## 2019-09-24 PROCEDURE — 6370000000 HC RX 637 (ALT 250 FOR IP): Performed by: INTERNAL MEDICINE

## 2019-09-24 RX ADMIN — FOLIC ACID 1 MG: 1 TABLET ORAL at 09:18

## 2019-09-24 RX ADMIN — MULTIPLE VITAMINS W/ MINERALS TAB 1 TABLET: TAB at 09:18

## 2019-09-24 RX ADMIN — PANTOPRAZOLE SODIUM 40 MG: 40 TABLET, DELAYED RELEASE ORAL at 09:18

## 2019-09-24 RX ADMIN — DIVALPROEX SODIUM 500 MG: 500 TABLET, DELAYED RELEASE ORAL at 09:18

## 2019-09-24 RX ADMIN — THIAMINE HCL TAB 100 MG 100 MG: 100 TAB at 09:18

## 2019-09-24 RX ADMIN — SERTRALINE HYDROCHLORIDE 100 MG: 100 TABLET ORAL at 09:18

## 2019-09-24 RX ADMIN — DIVALPROEX SODIUM 500 MG: 500 TABLET, DELAYED RELEASE ORAL at 21:53

## 2019-09-24 RX ADMIN — METOPROLOL SUCCINATE 25 MG: 25 TABLET, EXTENDED RELEASE ORAL at 09:18

## 2019-09-24 ASSESSMENT — PAIN - FUNCTIONAL ASSESSMENT: PAIN_FUNCTIONAL_ASSESSMENT: 0-10

## 2019-09-24 NOTE — GROUP NOTE
Group Therapy Note    Date: September 24    Group Start Time: 1430  Group End Time: 3990  Group Topic: Recovery    [facility]    SANDRA Valle LSW        Group Therapy Note    Attendees: 4         Patient's Goal:  Increase understanding of addiction and recovery process. Notes:  Pt is making progress AEB participating in group discussion about avoiding and coping with triggers. Status After Intervention:  Improved    Participation Level:  Active Listener and Interactive    Participation Quality: Appropriate, Attentive, Sharing and Supportive      Speech:  normal      Thought Process/Content: Logical      Affective Functioning: Congruent      Mood: stable      Level of consciousness:  Alert, Oriented x4 and Attentive      Response to Learning: Able to verbalize current knowledge/experience, Able to verbalize/acknowledge new learning, Able to retain information, Capable of insight, Able to change behavior and Progressing to goal      Endings: None Reported    Modes of Intervention: Support, Socialization, Exploration, Clarifying and Problem-solving      Discipline Responsible: /Counselor      Signature:  SANDRA Valle LSW

## 2019-09-24 NOTE — GROUP NOTE
Group Therapy Note    Date: September 24    Group Start Time: 1600  Group End Time: 1640  Group Topic: Group Documentation    [facility]    Paul Nation        Group Therapy Note    Attendees: 12/18         Patient's Goal:  Attend and participate in group  Notes:  Green behavior scale - life enhancing areas    Status After Intervention:  Improved    Participation Level:  Active Listener and Interactive    Participation Quality: Appropriate, Attentive, Sharing and Supportive      Speech:  normal      Thought Process/Content: Logical      Affective Functioning: Congruent      Mood: anxious      Level of consciousness:  Alert, Oriented x4 and Attentive      Response to Learning: Able to verbalize current knowledge/experience, Able to verbalize/acknowledge new learning, Able to retain information and Capable of insight      Endings: None Reported    Modes of Intervention: Education, Support, Exploration and Problem-solving      Discipline Responsible: [address] [place] [employer]      Signature:  Paul Nation

## 2019-09-24 NOTE — GROUP NOTE
Group Therapy Note    Date: September 24    Group Start Time: 1330  Group End Time: 1505  Group Topic: Cognitive Skills    [facility]    Fredo Canas    Patient's Goal:  To increase interpersonal interaction     Notes:      Status After Intervention:  Improved    Participation Level:  Active Listener and Interactive    Participation Quality: Appropriate, Attentive and Sharing      Speech:  normal      Thought Process/Content: Logical  Linear      Affective Functioning: Congruent      Mood: euthymic      Level of consciousness:  Alert, Oriented x4 and Attentive      Response to Learning: Able to verbalize current knowledge/experience, Able to verbalize/acknowledge new learning, Able to retain information and Progressing to goal      Endings: None Reported    Modes of Intervention: Education, Support, Socialization, Exploration, Clarifying and Problem-solving      Discipline Responsible: Psychoeducational Specialist      Signature:  Fredo Canas

## 2019-09-25 PROCEDURE — 1240000000 HC EMOTIONAL WELLNESS R&B

## 2019-09-25 PROCEDURE — 6370000000 HC RX 637 (ALT 250 FOR IP): Performed by: INTERNAL MEDICINE

## 2019-09-25 PROCEDURE — 6370000000 HC RX 637 (ALT 250 FOR IP): Performed by: PSYCHIATRY & NEUROLOGY

## 2019-09-25 PROCEDURE — 99232 SBSQ HOSP IP/OBS MODERATE 35: CPT | Performed by: REGISTERED NURSE

## 2019-09-25 PROCEDURE — 6370000000 HC RX 637 (ALT 250 FOR IP): Performed by: NURSE PRACTITIONER

## 2019-09-25 PROCEDURE — 6370000000 HC RX 637 (ALT 250 FOR IP): Performed by: REGISTERED NURSE

## 2019-09-25 RX ADMIN — PANTOPRAZOLE SODIUM 40 MG: 40 TABLET, DELAYED RELEASE ORAL at 08:38

## 2019-09-25 RX ADMIN — DIVALPROEX SODIUM 500 MG: 500 TABLET, DELAYED RELEASE ORAL at 21:01

## 2019-09-25 RX ADMIN — DIVALPROEX SODIUM 500 MG: 500 TABLET, DELAYED RELEASE ORAL at 08:38

## 2019-09-25 RX ADMIN — TRAZODONE HYDROCHLORIDE 50 MG: 50 TABLET ORAL at 21:02

## 2019-09-25 RX ADMIN — FOLIC ACID 1 MG: 1 TABLET ORAL at 08:38

## 2019-09-25 RX ADMIN — SERTRALINE HYDROCHLORIDE 100 MG: 100 TABLET ORAL at 08:38

## 2019-09-25 RX ADMIN — MULTIPLE VITAMINS W/ MINERALS TAB 1 TABLET: TAB at 08:38

## 2019-09-25 RX ADMIN — THIAMINE HCL TAB 100 MG 100 MG: 100 TAB at 08:38

## 2019-09-25 NOTE — GROUP NOTE
Group Therapy Note     Date: September 25     Group Start Time: 1100  Group End Time: 8290  Group Topic:  cognitive skills     [facility] ZORAIDA Floyd        Group Therapy Note     Attendees:         Patient's Goal:  To improve decision making skills / improve concentration     Notes:   Pt participated and was pleasant     Status After Intervention:  Improved     Participation Level:  Active Listener     Participation Quality: Appropriate and Attentive        Speech:  normal        Thought Process/Content: Logical        Affective Functioning: Congruent           Level of consciousness:  Alert and Oriented x4        Response to Learning: Able to verbalize current knowledge/experience and Progressing to goal        Endings: None Reported     Modes of Intervention: Education and Support        Discipline Responsible: Psychoeducational Specialist        Signature:  Arturo Matute

## 2019-09-25 NOTE — PROGRESS NOTES
limited   Suicidal Ideations: denies suicidal ideation   Homicidal Ideations: Negative for homicidal ideation      Medication Side Effects: absent       Attention Span attention span and concentration were age appropriate     Clinical Assessment Medical Decision    Axis I: Bipolar, Depressed    Precautions with Justification:   None    Medication Review/Mgmt: Medications reviewed - increased Zoloft to 150 mg daily 9/25/2019    Medical Issues: See Chart    Assessment of Risk for Harm to Self/Others:  None    PLAN  · Continue inpatient psychiatric treatment  · Supportive therapy with medication management. Reviewed risks and benefits as well as potential side effects with patient. · Review medications for efficacy and side effects. · Therapeutic support and empathetic care provided. · Engage in therapeutic activities and groups. · Follow up at [facility] after symptoms stabilized. · Ordered Depakote level on 9/24/19 AM and still slightly low but much improved. · Increased Zoloft to 150 mg daily 9/25/2019. · Discharge planning to sisters home possibly 9/27/2019.       Anticipated Discharge Date: 9/27/2019    Patient's Response to Treatment: Minimal/Slow    Renuka Cabrales  9/25/2019  2:40 PM

## 2019-09-25 NOTE — FLOWSHEET NOTE
*Patient participated in the [facility]       09/25/19 1534   Encounter Summary   Services provided to: Patient   Referral/Consult From: Rounding   Continue Visiting   (9/25/19)   Complexity of Encounter Low   Length of Encounter 30 minutes   Spiritual Assessment Completed Yes   Spiritual/Shinto   Type Spiritual support   Assessment Calm; Approachable   Intervention Active listening   Outcome Receptive

## 2019-09-26 PROCEDURE — 6370000000 HC RX 637 (ALT 250 FOR IP): Performed by: PSYCHIATRY & NEUROLOGY

## 2019-09-26 PROCEDURE — 1240000000 HC EMOTIONAL WELLNESS R&B

## 2019-09-26 PROCEDURE — 99232 SBSQ HOSP IP/OBS MODERATE 35: CPT | Performed by: REGISTERED NURSE

## 2019-09-26 PROCEDURE — 6370000000 HC RX 637 (ALT 250 FOR IP): Performed by: INTERNAL MEDICINE

## 2019-09-26 PROCEDURE — 6370000000 HC RX 637 (ALT 250 FOR IP): Performed by: NURSE PRACTITIONER

## 2019-09-26 PROCEDURE — 6370000000 HC RX 637 (ALT 250 FOR IP): Performed by: REGISTERED NURSE

## 2019-09-26 RX ORDER — IBUPROFEN 600 MG/1
600 TABLET ORAL EVERY 6 HOURS PRN
Status: DISCONTINUED | OUTPATIENT
Start: 2019-09-26 | End: 2019-09-27 | Stop reason: HOSPADM

## 2019-09-26 RX ADMIN — DIVALPROEX SODIUM 500 MG: 500 TABLET, DELAYED RELEASE ORAL at 08:29

## 2019-09-26 RX ADMIN — PANTOPRAZOLE SODIUM 40 MG: 40 TABLET, DELAYED RELEASE ORAL at 08:30

## 2019-09-26 RX ADMIN — SERTRALINE HYDROCHLORIDE 150 MG: 100 TABLET ORAL at 08:30

## 2019-09-26 RX ADMIN — METOPROLOL SUCCINATE 25 MG: 25 TABLET, EXTENDED RELEASE ORAL at 08:30

## 2019-09-26 RX ADMIN — FOLIC ACID 1 MG: 1 TABLET ORAL at 08:30

## 2019-09-26 RX ADMIN — MULTIPLE VITAMINS W/ MINERALS TAB 1 TABLET: TAB at 08:30

## 2019-09-26 RX ADMIN — THIAMINE HCL TAB 100 MG 100 MG: 100 TAB at 08:30

## 2019-09-26 RX ADMIN — DIVALPROEX SODIUM 500 MG: 500 TABLET, DELAYED RELEASE ORAL at 22:05

## 2019-09-26 RX ADMIN — TRAZODONE HYDROCHLORIDE 50 MG: 50 TABLET ORAL at 22:05

## 2019-09-26 RX ADMIN — IBUPROFEN 600 MG: 600 TABLET ORAL at 15:16

## 2019-09-26 ASSESSMENT — PAIN SCALES - GENERAL: PAINLEVEL_OUTOF10: 2

## 2019-09-26 NOTE — GROUP NOTE
Group Therapy Note     Date: September 26  Group Start Time: 1430  Group End Time: 6832  Group Topic: Cognitive Skills     [facility]    Mari Elisabeth, [address]           Group Therapy Note     Attendees:   6/14        Patient's Goal: To increase social interaction and to practice decision making and concentration. Notes: Pt participated fully in group and was able to practice decision making and concentration independently. Pt was pleasant and supportive of peers           Status After Intervention:  Improved     Participation Level:  Active Listener and Interactive     Participation Quality: Appropriate, Attentive, Sharing         Speech:   Normal     Thought Process/Content: Logical,linear     Affective Functioning: Congruent     Mood: euthymic         Level of consciousness:  Alert, and Attentive        Response to Learning: Able to verbalize current knowledge/experience, Able to verbalize/acknowledge new learning, and Progressing to goal        Endings: None Reported     Modes of Intervention: Education, Support, Socialization, Exploration, Clarifying and Problem-solving        Discipline Responsible: Psychoeducational Specialist        Signature:  Tamra Pencil

## 2019-09-26 NOTE — PROGRESS NOTES
Psychiatric Progress Note - Psychiatric Nurse Practitioner      Pertinent History & Psychiatric Examination    HPI: Jeremías Neumann was seen in his room in bed today where he reports that he has less depression. He reports that he is ready to go home to his sisters house. He reports hallucinations come and go, today there are none. He reports no itching on his arms related to hallucinations of bugs. He reports he slept better last night related to taking trazodone prior to bed. He reports eating well. He has not attended to ADLs today and has attended one group therapy session. He denies SI and HI. Reviewed medication and charting. Plan for discharge 9/27/2019. Complaints of Pain: none  Functioning Relationships: slow to socialize with others      Mental Status Evaluation:  Orientation: alertness: alert   Mood:. depressed      Affect:  euthymic       Appearance:  older than stated age   Activity:  Within Normal Limits   Gait/Posture: Normal   Speech:  normal pitch and normal volume   Thought Process:  circumstantial   Thought Content:  Previous hallucinations of bugs   Sensorium:  person, place, time/date and situation   Cognition:  grossly intact   Memory: intact   Insight:  limited   Judgment: limited   Suicidal Ideations: denies suicidal ideation   Homicidal Ideations: Negative for homicidal ideation      Medication Side Effects: absent       Attention Span attention span and concentration were age appropriate     Clinical Assessment Medical Decision    Axis I: Bipolar, Depressed    Precautions with Justification:   None    Medication Review/Mgmt: Medications reviewed   Medical Issues: See Chart    Assessment of Risk for Harm to Self/Others:  None    PLAN  · Continue inpatient psychiatric treatment  · Supportive therapy with medication management. Reviewed risks and benefits as well as potential side effects with patient. · Review medications for efficacy and side effects.   · Therapeutic support and empathetic care

## 2019-09-27 VITALS
HEART RATE: 79 BPM | SYSTOLIC BLOOD PRESSURE: 108 MMHG | RESPIRATION RATE: 14 BRPM | BODY MASS INDEX: 25.33 KG/M2 | DIASTOLIC BLOOD PRESSURE: 69 MMHG | WEIGHT: 187 LBS | TEMPERATURE: 97.3 F | HEIGHT: 72 IN | OXYGEN SATURATION: 100 %

## 2019-09-27 PROCEDURE — 5130000000 HC BRIDGE APPOINTMENT

## 2019-09-27 PROCEDURE — 6370000000 HC RX 637 (ALT 250 FOR IP): Performed by: REGISTERED NURSE

## 2019-09-27 PROCEDURE — 6370000000 HC RX 637 (ALT 250 FOR IP): Performed by: PSYCHIATRY & NEUROLOGY

## 2019-09-27 PROCEDURE — 99238 HOSP IP/OBS DSCHRG MGMT 30/<: CPT | Performed by: NURSE PRACTITIONER

## 2019-09-27 PROCEDURE — 6370000000 HC RX 637 (ALT 250 FOR IP): Performed by: INTERNAL MEDICINE

## 2019-09-27 RX ORDER — TRAZODONE HYDROCHLORIDE 50 MG/1
50 TABLET ORAL NIGHTLY PRN
Qty: 14 TABLET | Refills: 0 | Status: ON HOLD | OUTPATIENT
Start: 2019-09-27 | End: 2019-10-16 | Stop reason: HOSPADM

## 2019-09-27 RX ORDER — DIVALPROEX SODIUM 500 MG/1
500 TABLET, DELAYED RELEASE ORAL EVERY 12 HOURS SCHEDULED
Qty: 28 TABLET | Refills: 0 | Status: SHIPPED | OUTPATIENT
Start: 2019-09-27 | End: 2019-11-27 | Stop reason: SDUPTHER

## 2019-09-27 RX ORDER — DIVALPROEX SODIUM 500 MG/1
500 TABLET, DELAYED RELEASE ORAL EVERY 12 HOURS SCHEDULED
Qty: 28 TABLET | Refills: 0 | Status: CANCELLED | OUTPATIENT
Start: 2019-09-27

## 2019-09-27 RX ORDER — SERTRALINE HYDROCHLORIDE 100 MG/1
100 TABLET, FILM COATED ORAL DAILY
Qty: 14 TABLET | Refills: 0 | Status: SHIPPED | OUTPATIENT
Start: 2019-09-27 | End: 2019-09-27 | Stop reason: HOSPADM

## 2019-09-27 RX ORDER — TRAZODONE HYDROCHLORIDE 50 MG/1
50 TABLET ORAL NIGHTLY PRN
Qty: 14 TABLET | Refills: 0 | Status: CANCELLED | OUTPATIENT
Start: 2019-09-27

## 2019-09-27 RX ADMIN — THIAMINE HCL TAB 100 MG 100 MG: 100 TAB at 08:36

## 2019-09-27 RX ADMIN — METOPROLOL SUCCINATE 25 MG: 25 TABLET, EXTENDED RELEASE ORAL at 08:36

## 2019-09-27 RX ADMIN — SERTRALINE HYDROCHLORIDE 150 MG: 100 TABLET ORAL at 08:35

## 2019-09-27 RX ADMIN — DIVALPROEX SODIUM 500 MG: 500 TABLET, DELAYED RELEASE ORAL at 08:35

## 2019-09-27 RX ADMIN — PANTOPRAZOLE SODIUM 40 MG: 40 TABLET, DELAYED RELEASE ORAL at 08:36

## 2019-09-27 RX ADMIN — MULTIPLE VITAMINS W/ MINERALS TAB 1 TABLET: TAB at 08:36

## 2019-09-27 RX ADMIN — FOLIC ACID 1 MG: 1 TABLET ORAL at 08:36

## 2019-09-27 ASSESSMENT — PAIN SCALES - GENERAL: PAINLEVEL_OUTOF10: 0

## 2019-09-27 NOTE — GROUP NOTE
Group Therapy Note    Date: September 27    Group Start Time: 1100  Group End Time: 8660  Group Topic: Psychoeducation    [facility]    Best Freed    Patient's Goal: To increase interpersonal interaction     Notes:      Status After Intervention:  Improved    Participation Level:  Active Listener and Interactive    Participation Quality: Appropriate, Attentive and Sharing      Speech:  normal      Thought Process/Content: Logical  Linear      Affective Functioning: Congruent      Mood: euthymic      Level of consciousness:  Alert, Oriented x4 and Attentive      Response to Learning: Able to verbalize current knowledge/experience, Able to verbalize/acknowledge new learning, Able to retain information and Progressing to goal      Endings: None Reported    Modes of Intervention: Education, Support, Socialization, Exploration, Clarifying, Problem-solving and Activity      Discipline Responsible: Psychoeducational Specialist      Signature:  Best Freed

## 2019-09-27 NOTE — PLAN OF CARE
Patient continues to admit to non specific visual hallucinations, he remains flat in affect with minimal interaction with peers. Patient reports improvement in sleep but no discharge plan at this time. He is medication compliant this shift.
Problem: Depressive Behavior With or Without Suicide Precautions:  Goal: Able to verbalize and/or display a decrease in depressive symptoms  Description  Able to verbalize and/or display a decrease in depressive symptoms   9/23/2019 2357 by Sabino Miles RN  Outcome: Ongoing  Note:   Pt still appears depressed at times. He has a flat affect and is evasive with writer, but is seen out and social with peers this evening in day area. Denies physical concerns. No issues with sleep or appetite voiced. Pt was compliant with medications this evening. No behavioral issues. Pt also attended wrap up group tonight. Safety maintained per unit policy, including q 93E patient safety checks. Problem: Depressive Behavior With or Without Suicide Precautions:  Goal: Ability to disclose and discuss suicidal ideas will improve  Description  Ability to disclose and discuss suicidal ideas will improve   Outcome: Ongoing  Note:   Pt denies suicidal ideations at this time and agrees to seek assistance from staff should thoughts of self harm arise. Will continue to monitor patient for safety and behavior.
RECOMMENDATIONS:     continue group therapy , medications compliance, goal setting, individualized assessments and care, continue to monitor pt on unit      SHORT-TERM GOALS:   Time frame for Short-Term Goals: 5-7 days    LONG-TERM GOALS:  Time frame for Long-Term Goals: 6 months  Members Present in Team Meeting: See Signature Sheet    TOMY Sandoval

## 2019-10-10 ENCOUNTER — HOSPITAL ENCOUNTER (EMERGENCY)
Age: 49
Discharge: ANOTHER HOSPITAL | End: 2019-10-10
Attending: EMERGENCY MEDICINE
Payer: COMMERCIAL

## 2019-10-10 ENCOUNTER — APPOINTMENT (OUTPATIENT)
Dept: GENERAL RADIOLOGY | Age: 49
End: 2019-10-10
Payer: COMMERCIAL

## 2019-10-10 ENCOUNTER — HOSPITAL ENCOUNTER (INPATIENT)
Age: 49
LOS: 6 days | Discharge: OTHER INSTITUTION | DRG: 751 | End: 2019-10-16
Attending: PSYCHIATRY & NEUROLOGY | Admitting: PSYCHIATRY & NEUROLOGY
Payer: COMMERCIAL

## 2019-10-10 VITALS
SYSTOLIC BLOOD PRESSURE: 138 MMHG | HEART RATE: 72 BPM | TEMPERATURE: 98.2 F | BODY MASS INDEX: 25.5 KG/M2 | WEIGHT: 188 LBS | RESPIRATION RATE: 16 BRPM | OXYGEN SATURATION: 96 % | DIASTOLIC BLOOD PRESSURE: 86 MMHG

## 2019-10-10 DIAGNOSIS — R45.851 SUICIDAL IDEATION: ICD-10-CM

## 2019-10-10 DIAGNOSIS — F32.A DEPRESSION, UNSPECIFIED DEPRESSION TYPE: Primary | ICD-10-CM

## 2019-10-10 PROBLEM — F33.9 MAJOR DEPRESSION, RECURRENT (HCC): Status: ACTIVE | Noted: 2019-10-10

## 2019-10-10 LAB
ABSOLUTE EOS #: 0.07 K/UL (ref 0–0.44)
ABSOLUTE IMMATURE GRANULOCYTE: <0.03 K/UL (ref 0–0.3)
ABSOLUTE LYMPH #: 0.65 K/UL (ref 1.1–3.7)
ABSOLUTE MONO #: 0.61 K/UL (ref 0.1–1.2)
ACETAMINOPHEN LEVEL: <5 UG/ML (ref 10–30)
AMPHETAMINE SCREEN URINE: NEGATIVE
ANION GAP SERPL CALCULATED.3IONS-SCNC: 19 MMOL/L (ref 9–17)
BARBITURATE SCREEN URINE: NEGATIVE
BASOPHILS # BLD: 1 % (ref 0–2)
BASOPHILS ABSOLUTE: 0.05 K/UL (ref 0–0.2)
BENZODIAZEPINE SCREEN, URINE: NEGATIVE
BUN BLDV-MCNC: 6 MG/DL (ref 6–20)
BUN/CREAT BLD: 13 (ref 9–20)
BUPRENORPHINE URINE: NEGATIVE
CALCIUM SERPL-MCNC: 9.8 MG/DL (ref 8.6–10.4)
CANNABINOID SCREEN URINE: POSITIVE
CHLORIDE BLD-SCNC: 90 MMOL/L (ref 98–107)
CO2: 25 MMOL/L (ref 20–31)
COCAINE METABOLITE, URINE: NEGATIVE
CREAT SERPL-MCNC: 0.45 MG/DL (ref 0.7–1.2)
DIFFERENTIAL TYPE: ABNORMAL
EKG ATRIAL RATE: 68 BPM
EKG P AXIS: 40 DEGREES
EKG P-R INTERVAL: 136 MS
EKG Q-T INTERVAL: 390 MS
EKG QRS DURATION: 80 MS
EKG QTC CALCULATION (BAZETT): 414 MS
EKG R AXIS: 19 DEGREES
EKG T AXIS: 40 DEGREES
EKG VENTRICULAR RATE: 68 BPM
EOSINOPHILS RELATIVE PERCENT: 1 % (ref 1–4)
ETHANOL PERCENT: <0.01 %
ETHANOL: <10 MG/DL
GFR AFRICAN AMERICAN: >60 ML/MIN
GFR NON-AFRICAN AMERICAN: >60 ML/MIN
GFR SERPL CREATININE-BSD FRML MDRD: ABNORMAL ML/MIN/{1.73_M2}
GFR SERPL CREATININE-BSD FRML MDRD: ABNORMAL ML/MIN/{1.73_M2}
GLUCOSE BLD-MCNC: 86 MG/DL (ref 70–99)
HCT VFR BLD CALC: 42.9 % (ref 40.7–50.3)
HEMOGLOBIN: 14.6 G/DL (ref 13–17)
IMMATURE GRANULOCYTES: 0 %
LYMPHOCYTES # BLD: 10 % (ref 24–43)
MCH RBC QN AUTO: 34.2 PG (ref 25.2–33.5)
MCHC RBC AUTO-ENTMCNC: 34 G/DL (ref 28.4–34.8)
MCV RBC AUTO: 100.5 FL (ref 82.6–102.9)
MDMA URINE: ABNORMAL
METHADONE SCREEN, URINE: NEGATIVE
METHAMPHETAMINE, URINE: NEGATIVE
MONOCYTES # BLD: 9 % (ref 3–12)
NRBC AUTOMATED: 0 PER 100 WBC
OPIATES, URINE: NEGATIVE
OXYCODONE SCREEN URINE: NEGATIVE
PDW BLD-RTO: 12.9 % (ref 11.8–14.4)
PHENCYCLIDINE, URINE: NEGATIVE
PLATELET # BLD: 115 K/UL (ref 138–453)
PLATELET ESTIMATE: ABNORMAL
PMV BLD AUTO: 11.1 FL (ref 8.1–13.5)
POTASSIUM SERPL-SCNC: 3.6 MMOL/L (ref 3.7–5.3)
PROPOXYPHENE, URINE: NEGATIVE
RBC # BLD: 4.27 M/UL (ref 4.21–5.77)
RBC # BLD: ABNORMAL 10*6/UL
SALICYLATE LEVEL: <1 MG/DL (ref 3–10)
SEG NEUTROPHILS: 79 % (ref 36–65)
SEGMENTED NEUTROPHILS ABSOLUTE COUNT: 5.23 K/UL (ref 1.5–8.1)
SODIUM BLD-SCNC: 134 MMOL/L (ref 135–144)
TEST INFORMATION: ABNORMAL
TRICYCLIC ANTIDEPRESSANTS, UR: NEGATIVE
TROPONIN INTERP: NORMAL
TROPONIN T: <0.03 NG/ML
TROPONIN, HIGH SENSITIVITY: NORMAL NG/L (ref 0–22)
VALPROIC ACID LEVEL: <3 UG/ML (ref 50–125)
VALPROIC DATE LAST DOSE: ABNORMAL
VALPROIC DOSE AMOUNT: ABNORMAL
VALPROIC TIME LAST DOSE: ABNORMAL
WBC # BLD: 6.6 K/UL (ref 3.5–11.3)
WBC # BLD: ABNORMAL 10*3/UL

## 2019-10-10 PROCEDURE — 80306 DRUG TEST PRSMV INSTRMNT: CPT

## 2019-10-10 PROCEDURE — 99285 EMERGENCY DEPT VISIT HI MDM: CPT

## 2019-10-10 PROCEDURE — 6370000000 HC RX 637 (ALT 250 FOR IP): Performed by: REGISTERED NURSE

## 2019-10-10 PROCEDURE — 80164 ASSAY DIPROPYLACETIC ACD TOT: CPT

## 2019-10-10 PROCEDURE — 80307 DRUG TEST PRSMV CHEM ANLYZR: CPT

## 2019-10-10 PROCEDURE — 93010 ELECTROCARDIOGRAM REPORT: CPT | Performed by: INTERNAL MEDICINE

## 2019-10-10 PROCEDURE — 93005 ELECTROCARDIOGRAM TRACING: CPT | Performed by: EMERGENCY MEDICINE

## 2019-10-10 PROCEDURE — 1240000000 HC EMOTIONAL WELLNESS R&B

## 2019-10-10 PROCEDURE — 80048 BASIC METABOLIC PNL TOTAL CA: CPT

## 2019-10-10 PROCEDURE — 36415 COLL VENOUS BLD VENIPUNCTURE: CPT

## 2019-10-10 PROCEDURE — 85025 COMPLETE CBC W/AUTO DIFF WBC: CPT

## 2019-10-10 PROCEDURE — 71045 X-RAY EXAM CHEST 1 VIEW: CPT

## 2019-10-10 PROCEDURE — 84484 ASSAY OF TROPONIN QUANT: CPT

## 2019-10-10 PROCEDURE — 2580000003 HC RX 258: Performed by: EMERGENCY MEDICINE

## 2019-10-10 PROCEDURE — G0480 DRUG TEST DEF 1-7 CLASSES: HCPCS

## 2019-10-10 RX ORDER — PANTOPRAZOLE SODIUM 40 MG/1
40 TABLET, DELAYED RELEASE ORAL DAILY
Status: DISCONTINUED | OUTPATIENT
Start: 2019-10-10 | End: 2019-10-16 | Stop reason: HOSPADM

## 2019-10-10 RX ORDER — DIVALPROEX SODIUM 500 MG/1
500 TABLET, DELAYED RELEASE ORAL EVERY 12 HOURS SCHEDULED
Status: DISCONTINUED | OUTPATIENT
Start: 2019-10-10 | End: 2019-10-16 | Stop reason: HOSPADM

## 2019-10-10 RX ORDER — FOLIC ACID 1 MG/1
1 TABLET ORAL DAILY
Status: DISCONTINUED | OUTPATIENT
Start: 2019-10-10 | End: 2019-10-16 | Stop reason: HOSPADM

## 2019-10-10 RX ORDER — TRAZODONE HYDROCHLORIDE 50 MG/1
50 TABLET ORAL NIGHTLY PRN
Status: DISCONTINUED | OUTPATIENT
Start: 2019-10-10 | End: 2019-10-14

## 2019-10-10 RX ORDER — MAGNESIUM HYDROXIDE/ALUMINUM HYDROXICE/SIMETHICONE 120; 1200; 1200 MG/30ML; MG/30ML; MG/30ML
30 SUSPENSION ORAL EVERY 6 HOURS PRN
Status: DISCONTINUED | OUTPATIENT
Start: 2019-10-10 | End: 2019-10-16 | Stop reason: HOSPADM

## 2019-10-10 RX ORDER — MINERAL OIL AND WHITE PETROLATUM 150; 830 MG/G; MG/G
OINTMENT OPHTHALMIC PRN
Status: DISCONTINUED | OUTPATIENT
Start: 2019-10-10 | End: 2019-10-16 | Stop reason: HOSPADM

## 2019-10-10 RX ORDER — BENZTROPINE MESYLATE 1 MG/ML
2 INJECTION INTRAMUSCULAR; INTRAVENOUS 2 TIMES DAILY PRN
Status: DISCONTINUED | OUTPATIENT
Start: 2019-10-10 | End: 2019-10-16 | Stop reason: HOSPADM

## 2019-10-10 RX ORDER — ACETAMINOPHEN 325 MG/1
650 TABLET ORAL EVERY 4 HOURS PRN
Status: DISCONTINUED | OUTPATIENT
Start: 2019-10-10 | End: 2019-10-16 | Stop reason: HOSPADM

## 2019-10-10 RX ORDER — NICOTINE 21 MG/24HR
1 PATCH, TRANSDERMAL 24 HOURS TRANSDERMAL DAILY
Status: DISCONTINUED | OUTPATIENT
Start: 2019-10-10 | End: 2019-10-16 | Stop reason: HOSPADM

## 2019-10-10 RX ORDER — ALBUTEROL SULFATE 90 UG/1
2 AEROSOL, METERED RESPIRATORY (INHALATION) EVERY 6 HOURS PRN
Status: DISCONTINUED | OUTPATIENT
Start: 2019-10-10 | End: 2019-10-16 | Stop reason: HOSPADM

## 2019-10-10 RX ORDER — HYDROXYZINE HYDROCHLORIDE 25 MG/1
25 TABLET, FILM COATED ORAL 3 TIMES DAILY PRN
Status: DISCONTINUED | OUTPATIENT
Start: 2019-10-10 | End: 2019-10-16 | Stop reason: HOSPADM

## 2019-10-10 RX ORDER — 0.9 % SODIUM CHLORIDE 0.9 %
1000 INTRAVENOUS SOLUTION INTRAVENOUS ONCE
Status: COMPLETED | OUTPATIENT
Start: 2019-10-10 | End: 2019-10-10

## 2019-10-10 RX ORDER — M-VIT,TX,IRON,MINS/CALC/FOLIC 27MG-0.4MG
1 TABLET ORAL DAILY
Status: DISCONTINUED | OUTPATIENT
Start: 2019-10-10 | End: 2019-10-16 | Stop reason: HOSPADM

## 2019-10-10 RX ORDER — CHLORDIAZEPOXIDE HYDROCHLORIDE 10 MG/1
10 CAPSULE, GELATIN COATED ORAL EVERY 6 HOURS PRN
Status: DISCONTINUED | OUTPATIENT
Start: 2019-10-10 | End: 2019-10-16 | Stop reason: HOSPADM

## 2019-10-10 RX ORDER — METOPROLOL SUCCINATE 25 MG/1
25 TABLET, EXTENDED RELEASE ORAL DAILY
Status: DISCONTINUED | OUTPATIENT
Start: 2019-10-10 | End: 2019-10-16 | Stop reason: HOSPADM

## 2019-10-10 RX ORDER — THIAMINE MONONITRATE (VIT B1) 100 MG
100 TABLET ORAL DAILY
Status: DISCONTINUED | OUTPATIENT
Start: 2019-10-10 | End: 2019-10-16 | Stop reason: HOSPADM

## 2019-10-10 RX ADMIN — HYDROXYZINE HYDROCHLORIDE 25 MG: 25 TABLET, FILM COATED ORAL at 21:06

## 2019-10-10 RX ADMIN — DIVALPROEX SODIUM 500 MG: 500 TABLET, DELAYED RELEASE ORAL at 21:06

## 2019-10-10 RX ADMIN — MULTIPLE VITAMINS W/ MINERALS TAB 1 TABLET: TAB at 14:59

## 2019-10-10 RX ADMIN — SODIUM CHLORIDE 1000 ML: 9 INJECTION, SOLUTION INTRAVENOUS at 07:45

## 2019-10-10 RX ADMIN — THIAMINE HCL TAB 100 MG 100 MG: 100 TAB at 14:58

## 2019-10-10 RX ADMIN — PANTOPRAZOLE SODIUM 40 MG: 40 TABLET, DELAYED RELEASE ORAL at 14:58

## 2019-10-10 RX ADMIN — CHLORDIAZEPOXIDE HYDROCHLORIDE 10 MG: 10 CAPSULE ORAL at 21:06

## 2019-10-10 RX ADMIN — FOLIC ACID 1 MG: 1 TABLET ORAL at 14:58

## 2019-10-10 RX ADMIN — CHLORDIAZEPOXIDE HYDROCHLORIDE 10 MG: 10 CAPSULE ORAL at 15:02

## 2019-10-10 RX ADMIN — METOPROLOL SUCCINATE 25 MG: 25 TABLET, EXTENDED RELEASE ORAL at 14:58

## 2019-10-10 RX ADMIN — TRAZODONE HYDROCHLORIDE 50 MG: 50 TABLET ORAL at 21:06

## 2019-10-10 ASSESSMENT — PAIN DESCRIPTION - LOCATION: LOCATION: CHEST

## 2019-10-10 ASSESSMENT — PATIENT HEALTH QUESTIONNAIRE - PHQ9: SUM OF ALL RESPONSES TO PHQ QUESTIONS 1-9: 9

## 2019-10-10 ASSESSMENT — LIFESTYLE VARIABLES: HISTORY_ALCOHOL_USE: YES

## 2019-10-10 ASSESSMENT — SLEEP AND FATIGUE QUESTIONNAIRES
RESTFUL SLEEP: NO
DIFFICULTY STAYING ASLEEP: YES
DO YOU USE A SLEEP AID: NO
AVERAGE NUMBER OF SLEEP HOURS: 6
DO YOU HAVE DIFFICULTY SLEEPING: YES
SLEEP PATTERN: DIFFICULTY FALLING ASLEEP;DISTURBED/INTERRUPTED SLEEP;RESTLESSNESS
DIFFICULTY ARISING: NO
DIFFICULTY FALLING ASLEEP: YES

## 2019-10-10 ASSESSMENT — PAIN SCALES - GENERAL
PAINLEVEL_OUTOF10: 0
PAINLEVEL_OUTOF10: 0
PAINLEVEL_OUTOF10: 6

## 2019-10-10 ASSESSMENT — PAIN DESCRIPTION - PAIN TYPE: TYPE: ACUTE PAIN

## 2019-10-10 ASSESSMENT — PAIN DESCRIPTION - DESCRIPTORS: DESCRIPTORS: ACHING

## 2019-10-10 ASSESSMENT — PAIN DESCRIPTION - ORIENTATION: ORIENTATION: MID

## 2019-10-11 LAB
CHOLESTEROL/HDL RATIO: 1.9
CHOLESTEROL: 169 MG/DL
ESTIMATED AVERAGE GLUCOSE: 94 MG/DL
HBA1C MFR BLD: 4.9 % (ref 4–6)
HDLC SERPL-MCNC: 90 MG/DL
LDL CHOLESTEROL: 68 MG/DL (ref 0–130)
THYROXINE, FREE: 1.11 NG/DL (ref 0.93–1.7)
TRIGL SERPL-MCNC: 56 MG/DL
TSH SERPL DL<=0.05 MIU/L-ACNC: 1.09 MIU/L (ref 0.3–5)
VLDLC SERPL CALC-MCNC: NORMAL MG/DL (ref 1–30)

## 2019-10-11 PROCEDURE — 36415 COLL VENOUS BLD VENIPUNCTURE: CPT

## 2019-10-11 PROCEDURE — 1240000000 HC EMOTIONAL WELLNESS R&B

## 2019-10-11 PROCEDURE — 84443 ASSAY THYROID STIM HORMONE: CPT

## 2019-10-11 PROCEDURE — 6370000000 HC RX 637 (ALT 250 FOR IP): Performed by: REGISTERED NURSE

## 2019-10-11 PROCEDURE — 90792 PSYCH DIAG EVAL W/MED SRVCS: CPT | Performed by: PSYCHIATRY & NEUROLOGY

## 2019-10-11 PROCEDURE — 80061 LIPID PANEL: CPT

## 2019-10-11 PROCEDURE — 83036 HEMOGLOBIN GLYCOSYLATED A1C: CPT

## 2019-10-11 PROCEDURE — 84439 ASSAY OF FREE THYROXINE: CPT

## 2019-10-11 RX ADMIN — TRAZODONE HYDROCHLORIDE 50 MG: 50 TABLET ORAL at 21:42

## 2019-10-11 RX ADMIN — MULTIPLE VITAMINS W/ MINERALS TAB 1 TABLET: TAB at 08:11

## 2019-10-11 RX ADMIN — METOPROLOL SUCCINATE 25 MG: 25 TABLET, EXTENDED RELEASE ORAL at 08:18

## 2019-10-11 RX ADMIN — HYDROXYZINE HYDROCHLORIDE 25 MG: 25 TABLET, FILM COATED ORAL at 21:42

## 2019-10-11 RX ADMIN — PANTOPRAZOLE SODIUM 40 MG: 40 TABLET, DELAYED RELEASE ORAL at 08:10

## 2019-10-11 RX ADMIN — THIAMINE HCL TAB 100 MG 100 MG: 100 TAB at 08:10

## 2019-10-11 RX ADMIN — FOLIC ACID 1 MG: 1 TABLET ORAL at 08:11

## 2019-10-11 RX ADMIN — DIVALPROEX SODIUM 500 MG: 500 TABLET, DELAYED RELEASE ORAL at 08:10

## 2019-10-11 RX ADMIN — DIVALPROEX SODIUM 500 MG: 500 TABLET, DELAYED RELEASE ORAL at 21:42

## 2019-10-11 RX ADMIN — SERTRALINE HYDROCHLORIDE 150 MG: 100 TABLET ORAL at 08:10

## 2019-10-11 ASSESSMENT — PAIN SCALES - GENERAL: PAINLEVEL_OUTOF10: 0

## 2019-10-11 ASSESSMENT — LIFESTYLE VARIABLES: HISTORY_ALCOHOL_USE: YES

## 2019-10-12 PROCEDURE — 6370000000 HC RX 637 (ALT 250 FOR IP): Performed by: REGISTERED NURSE

## 2019-10-12 PROCEDURE — 99232 SBSQ HOSP IP/OBS MODERATE 35: CPT | Performed by: NURSE PRACTITIONER

## 2019-10-12 PROCEDURE — 1240000000 HC EMOTIONAL WELLNESS R&B

## 2019-10-12 RX ADMIN — THIAMINE HCL TAB 100 MG 100 MG: 100 TAB at 08:49

## 2019-10-12 RX ADMIN — TRAZODONE HYDROCHLORIDE 50 MG: 50 TABLET ORAL at 21:12

## 2019-10-12 RX ADMIN — FOLIC ACID 1 MG: 1 TABLET ORAL at 08:49

## 2019-10-12 RX ADMIN — METOPROLOL SUCCINATE 25 MG: 25 TABLET, EXTENDED RELEASE ORAL at 08:50

## 2019-10-12 RX ADMIN — DIVALPROEX SODIUM 500 MG: 500 TABLET, DELAYED RELEASE ORAL at 08:50

## 2019-10-12 RX ADMIN — SERTRALINE HYDROCHLORIDE 150 MG: 100 TABLET ORAL at 08:49

## 2019-10-12 RX ADMIN — HYDROXYZINE HYDROCHLORIDE 25 MG: 25 TABLET, FILM COATED ORAL at 21:12

## 2019-10-12 RX ADMIN — CHLORDIAZEPOXIDE HYDROCHLORIDE 10 MG: 10 CAPSULE ORAL at 21:12

## 2019-10-12 RX ADMIN — MULTIPLE VITAMINS W/ MINERALS TAB 1 TABLET: TAB at 08:49

## 2019-10-12 RX ADMIN — DIVALPROEX SODIUM 500 MG: 500 TABLET, DELAYED RELEASE ORAL at 21:12

## 2019-10-12 RX ADMIN — PANTOPRAZOLE SODIUM 40 MG: 40 TABLET, DELAYED RELEASE ORAL at 08:49

## 2019-10-12 RX ADMIN — CHLORDIAZEPOXIDE HYDROCHLORIDE 10 MG: 10 CAPSULE ORAL at 08:56

## 2019-10-12 ASSESSMENT — ENCOUNTER SYMPTOMS
EYE PAIN: 0
ABDOMINAL PAIN: 0
CONSTIPATION: 0
CHEST TIGHTNESS: 0
TROUBLE SWALLOWING: 0
DIARRHEA: 0
EYE REDNESS: 0
BACK PAIN: 0
SHORTNESS OF BREATH: 0
COUGH: 0
EYE DISCHARGE: 0
SINUS PAIN: 0
BLOOD IN STOOL: 0
SORE THROAT: 0
WHEEZING: 0
VOMITING: 0
COLOR CHANGE: 0
NAUSEA: 0
VOICE CHANGE: 0

## 2019-10-13 PROCEDURE — 1240000000 HC EMOTIONAL WELLNESS R&B

## 2019-10-13 PROCEDURE — 99232 SBSQ HOSP IP/OBS MODERATE 35: CPT | Performed by: NURSE PRACTITIONER

## 2019-10-13 PROCEDURE — 6370000000 HC RX 637 (ALT 250 FOR IP): Performed by: REGISTERED NURSE

## 2019-10-13 RX ADMIN — PANTOPRAZOLE SODIUM 40 MG: 40 TABLET, DELAYED RELEASE ORAL at 08:58

## 2019-10-13 RX ADMIN — TRAZODONE HYDROCHLORIDE 50 MG: 50 TABLET ORAL at 21:54

## 2019-10-13 RX ADMIN — METOPROLOL SUCCINATE 25 MG: 25 TABLET, EXTENDED RELEASE ORAL at 08:58

## 2019-10-13 RX ADMIN — MULTIPLE VITAMINS W/ MINERALS TAB 1 TABLET: TAB at 08:58

## 2019-10-13 RX ADMIN — CHLORDIAZEPOXIDE HYDROCHLORIDE 10 MG: 10 CAPSULE ORAL at 15:36

## 2019-10-13 RX ADMIN — FOLIC ACID 1 MG: 1 TABLET ORAL at 08:58

## 2019-10-13 RX ADMIN — THIAMINE HCL TAB 100 MG 100 MG: 100 TAB at 08:58

## 2019-10-13 RX ADMIN — HYDROXYZINE HYDROCHLORIDE 25 MG: 25 TABLET, FILM COATED ORAL at 21:54

## 2019-10-13 RX ADMIN — DIVALPROEX SODIUM 500 MG: 500 TABLET, DELAYED RELEASE ORAL at 08:58

## 2019-10-13 RX ADMIN — SERTRALINE HYDROCHLORIDE 150 MG: 100 TABLET ORAL at 08:58

## 2019-10-13 RX ADMIN — DIVALPROEX SODIUM 500 MG: 500 TABLET, DELAYED RELEASE ORAL at 21:54

## 2019-10-13 ASSESSMENT — PAIN SCALES - GENERAL: PAINLEVEL_OUTOF10: 0

## 2019-10-14 PROCEDURE — 6370000000 HC RX 637 (ALT 250 FOR IP): Performed by: PSYCHIATRY & NEUROLOGY

## 2019-10-14 PROCEDURE — 99232 SBSQ HOSP IP/OBS MODERATE 35: CPT | Performed by: PSYCHIATRY & NEUROLOGY

## 2019-10-14 PROCEDURE — 1240000000 HC EMOTIONAL WELLNESS R&B

## 2019-10-14 PROCEDURE — 6370000000 HC RX 637 (ALT 250 FOR IP): Performed by: REGISTERED NURSE

## 2019-10-14 RX ORDER — TRAZODONE HYDROCHLORIDE 100 MG/1
100 TABLET ORAL NIGHTLY PRN
Status: DISCONTINUED | OUTPATIENT
Start: 2019-10-14 | End: 2019-10-16 | Stop reason: HOSPADM

## 2019-10-14 RX ADMIN — THIAMINE HCL TAB 100 MG 100 MG: 100 TAB at 09:00

## 2019-10-14 RX ADMIN — FOLIC ACID 1 MG: 1 TABLET ORAL at 09:01

## 2019-10-14 RX ADMIN — TRAZODONE HYDROCHLORIDE 100 MG: 100 TABLET ORAL at 21:26

## 2019-10-14 RX ADMIN — HYDROXYZINE HYDROCHLORIDE 25 MG: 25 TABLET, FILM COATED ORAL at 21:26

## 2019-10-14 RX ADMIN — DIVALPROEX SODIUM 500 MG: 500 TABLET, DELAYED RELEASE ORAL at 21:26

## 2019-10-14 RX ADMIN — ACETAMINOPHEN 650 MG: 325 TABLET, FILM COATED ORAL at 09:31

## 2019-10-14 RX ADMIN — PANTOPRAZOLE SODIUM 40 MG: 40 TABLET, DELAYED RELEASE ORAL at 09:01

## 2019-10-14 RX ADMIN — METOPROLOL SUCCINATE 25 MG: 25 TABLET, EXTENDED RELEASE ORAL at 09:01

## 2019-10-14 RX ADMIN — MULTIPLE VITAMINS W/ MINERALS TAB 1 TABLET: TAB at 09:01

## 2019-10-14 RX ADMIN — DIVALPROEX SODIUM 500 MG: 500 TABLET, DELAYED RELEASE ORAL at 09:00

## 2019-10-14 RX ADMIN — SERTRALINE HYDROCHLORIDE 150 MG: 100 TABLET ORAL at 09:00

## 2019-10-14 ASSESSMENT — PAIN SCALES - GENERAL
PAINLEVEL_OUTOF10: 0
PAINLEVEL_OUTOF10: 8

## 2019-10-15 PROCEDURE — 1240000000 HC EMOTIONAL WELLNESS R&B

## 2019-10-15 PROCEDURE — 99232 SBSQ HOSP IP/OBS MODERATE 35: CPT | Performed by: PSYCHIATRY & NEUROLOGY

## 2019-10-15 PROCEDURE — 6370000000 HC RX 637 (ALT 250 FOR IP): Performed by: PSYCHIATRY & NEUROLOGY

## 2019-10-15 PROCEDURE — 6370000000 HC RX 637 (ALT 250 FOR IP): Performed by: REGISTERED NURSE

## 2019-10-15 RX ORDER — DOXEPIN HYDROCHLORIDE 10 MG/1
10 CAPSULE ORAL NIGHTLY
Status: DISCONTINUED | OUTPATIENT
Start: 2019-10-15 | End: 2019-10-16 | Stop reason: HOSPADM

## 2019-10-15 RX ADMIN — SERTRALINE HYDROCHLORIDE 150 MG: 100 TABLET ORAL at 09:01

## 2019-10-15 RX ADMIN — PANTOPRAZOLE SODIUM 40 MG: 40 TABLET, DELAYED RELEASE ORAL at 09:02

## 2019-10-15 RX ADMIN — HYDROXYZINE HYDROCHLORIDE 25 MG: 25 TABLET, FILM COATED ORAL at 20:54

## 2019-10-15 RX ADMIN — MULTIPLE VITAMINS W/ MINERALS TAB 1 TABLET: TAB at 09:02

## 2019-10-15 RX ADMIN — THIAMINE HCL TAB 100 MG 100 MG: 100 TAB at 09:02

## 2019-10-15 RX ADMIN — DOXEPIN HYDROCHLORIDE 10 MG: 10 CAPSULE ORAL at 20:57

## 2019-10-15 RX ADMIN — FOLIC ACID 1 MG: 1 TABLET ORAL at 09:02

## 2019-10-15 RX ADMIN — DIVALPROEX SODIUM 500 MG: 500 TABLET, DELAYED RELEASE ORAL at 09:01

## 2019-10-15 RX ADMIN — DIVALPROEX SODIUM 500 MG: 500 TABLET, DELAYED RELEASE ORAL at 20:54

## 2019-10-15 RX ADMIN — METOPROLOL SUCCINATE 25 MG: 25 TABLET, EXTENDED RELEASE ORAL at 09:01

## 2019-10-15 RX ADMIN — TRAZODONE HYDROCHLORIDE 100 MG: 100 TABLET ORAL at 20:54

## 2019-10-16 VITALS
HEART RATE: 69 BPM | TEMPERATURE: 97.5 F | RESPIRATION RATE: 14 BRPM | DIASTOLIC BLOOD PRESSURE: 72 MMHG | HEIGHT: 72 IN | BODY MASS INDEX: 25.38 KG/M2 | SYSTOLIC BLOOD PRESSURE: 101 MMHG | WEIGHT: 187.4 LBS

## 2019-10-16 PROCEDURE — 5130000000 HC BRIDGE APPOINTMENT: Performed by: COUNSELOR

## 2019-10-16 PROCEDURE — 99238 HOSP IP/OBS DSCHRG MGMT 30/<: CPT | Performed by: PSYCHIATRY & NEUROLOGY

## 2019-10-16 PROCEDURE — 6370000000 HC RX 637 (ALT 250 FOR IP): Performed by: REGISTERED NURSE

## 2019-10-16 RX ORDER — TRAZODONE HYDROCHLORIDE 100 MG/1
100 TABLET ORAL NIGHTLY PRN
Qty: 30 TABLET | Refills: 0 | Status: SHIPPED | OUTPATIENT
Start: 2019-10-16 | End: 2019-11-27 | Stop reason: SDUPTHER

## 2019-10-16 RX ORDER — DOXEPIN HYDROCHLORIDE 10 MG/1
10 CAPSULE ORAL NIGHTLY
Qty: 30 CAPSULE | Refills: 0 | Status: SHIPPED | OUTPATIENT
Start: 2019-10-16 | End: 2019-12-13

## 2019-10-16 RX ORDER — HYDROXYZINE HYDROCHLORIDE 25 MG/1
25 TABLET, FILM COATED ORAL 3 TIMES DAILY PRN
Qty: 10 TABLET | Refills: 0 | Status: SHIPPED | OUTPATIENT
Start: 2019-10-16 | End: 2019-10-26

## 2019-10-16 RX ADMIN — MULTIPLE VITAMINS W/ MINERALS TAB 1 TABLET: TAB at 08:59

## 2019-10-16 RX ADMIN — METOPROLOL SUCCINATE 25 MG: 25 TABLET, EXTENDED RELEASE ORAL at 09:04

## 2019-10-16 RX ADMIN — PANTOPRAZOLE SODIUM 40 MG: 40 TABLET, DELAYED RELEASE ORAL at 08:58

## 2019-10-16 RX ADMIN — FOLIC ACID 1 MG: 1 TABLET ORAL at 08:59

## 2019-10-16 RX ADMIN — DIVALPROEX SODIUM 500 MG: 500 TABLET, DELAYED RELEASE ORAL at 08:59

## 2019-10-16 RX ADMIN — THIAMINE HCL TAB 100 MG 100 MG: 100 TAB at 08:59

## 2019-10-16 RX ADMIN — SERTRALINE HYDROCHLORIDE 150 MG: 100 TABLET ORAL at 08:59

## 2019-11-27 ENCOUNTER — HOSPITAL ENCOUNTER (OUTPATIENT)
Age: 49
Discharge: HOME | End: 2019-11-29
Payer: COMMERCIAL

## 2019-11-27 ENCOUNTER — HOSPITAL ENCOUNTER (OUTPATIENT)
Dept: GENERAL RADIOLOGY | Age: 49
Discharge: HOME | End: 2019-11-29
Payer: COMMERCIAL

## 2019-11-27 ENCOUNTER — OFFICE VISIT (OUTPATIENT)
Dept: PRIMARY CARE CLINIC | Age: 49
End: 2019-11-27
Payer: COMMERCIAL

## 2019-11-27 VITALS
TEMPERATURE: 97.3 F | WEIGHT: 202 LBS | DIASTOLIC BLOOD PRESSURE: 75 MMHG | BODY MASS INDEX: 27.4 KG/M2 | HEART RATE: 80 BPM | OXYGEN SATURATION: 98 % | SYSTOLIC BLOOD PRESSURE: 121 MMHG

## 2019-11-27 DIAGNOSIS — G40.909 SEIZURE DISORDER (HCC): ICD-10-CM

## 2019-11-27 DIAGNOSIS — G89.29 CHRONIC PAIN OF RIGHT ANKLE: ICD-10-CM

## 2019-11-27 DIAGNOSIS — F33.2 SEVERE EPISODE OF RECURRENT MAJOR DEPRESSIVE DISORDER, WITHOUT PSYCHOTIC FEATURES (HCC): ICD-10-CM

## 2019-11-27 DIAGNOSIS — M25.571 CHRONIC PAIN OF RIGHT ANKLE: ICD-10-CM

## 2019-11-27 DIAGNOSIS — G47.00 INSOMNIA, UNSPECIFIED TYPE: ICD-10-CM

## 2019-11-27 DIAGNOSIS — Z76.0 ENCOUNTER FOR MEDICATION REFILL: Primary | ICD-10-CM

## 2019-11-27 PROCEDURE — 73610 X-RAY EXAM OF ANKLE: CPT

## 2019-11-27 PROCEDURE — 99202 OFFICE O/P NEW SF 15 MIN: CPT | Performed by: NURSE PRACTITIONER

## 2019-11-27 PROCEDURE — G8427 DOCREV CUR MEDS BY ELIG CLIN: HCPCS | Performed by: NURSE PRACTITIONER

## 2019-11-27 PROCEDURE — G8484 FLU IMMUNIZE NO ADMIN: HCPCS | Performed by: NURSE PRACTITIONER

## 2019-11-27 PROCEDURE — 3017F COLORECTAL CA SCREEN DOC REV: CPT | Performed by: NURSE PRACTITIONER

## 2019-11-27 PROCEDURE — G8419 CALC BMI OUT NRM PARAM NOF/U: HCPCS | Performed by: NURSE PRACTITIONER

## 2019-11-27 PROCEDURE — 4004F PT TOBACCO SCREEN RCVD TLK: CPT | Performed by: NURSE PRACTITIONER

## 2019-11-27 RX ORDER — HYDROXYZINE HYDROCHLORIDE 25 MG/1
25 TABLET, FILM COATED ORAL 3 TIMES DAILY PRN
COMMUNITY
End: 2021-04-08

## 2019-11-27 RX ORDER — M-VIT,TX,IRON,MINS/CALC/FOLIC 27MG-0.4MG
1 TABLET ORAL DAILY
Qty: 30 TABLET | Refills: 3 | Status: SHIPPED | OUTPATIENT
Start: 2019-11-27 | End: 2021-04-08

## 2019-11-27 RX ORDER — ACETAMINOPHEN 500 MG
500 TABLET ORAL 4 TIMES DAILY PRN
Qty: 60 TABLET | Refills: 0 | Status: SHIPPED | OUTPATIENT
Start: 2019-11-27 | End: 2021-02-14

## 2019-11-27 RX ORDER — TRAZODONE HYDROCHLORIDE 100 MG/1
100 TABLET ORAL NIGHTLY PRN
Qty: 30 TABLET | Refills: 0 | Status: SHIPPED | OUTPATIENT
Start: 2019-11-27 | End: 2019-12-13 | Stop reason: SDUPTHER

## 2019-11-27 RX ORDER — DIVALPROEX SODIUM 500 MG/1
500 TABLET, DELAYED RELEASE ORAL EVERY 12 HOURS SCHEDULED
Qty: 28 TABLET | Refills: 0 | Status: SHIPPED | OUTPATIENT
Start: 2019-11-27 | End: 2019-12-05 | Stop reason: SDUPTHER

## 2019-11-27 ASSESSMENT — ENCOUNTER SYMPTOMS
COUGH: 0
BACK PAIN: 0
VOMITING: 0
EYE PAIN: 0
ABDOMINAL PAIN: 0
SHORTNESS OF BREATH: 0
SINUS PAIN: 0
NAUSEA: 0
SORE THROAT: 0
DIARRHEA: 0

## 2019-12-04 ENCOUNTER — HOSPITAL ENCOUNTER (OUTPATIENT)
Age: 49
Setting detail: SPECIMEN
Discharge: HOME | End: 2019-12-04
Payer: COMMERCIAL

## 2019-12-04 LAB
ABSOLUTE BANDS #: 0.28 K/UL (ref 0–1)
ABSOLUTE EOS #: 0.56 K/UL (ref 0–0.4)
ABSOLUTE IMMATURE GRANULOCYTE: ABNORMAL K/UL (ref 0–0.3)
ABSOLUTE LYMPH #: 1.12 K/UL (ref 1–4.8)
ABSOLUTE MONO #: 0.84 K/UL (ref 0.1–1.3)
ALBUMIN SERPL-MCNC: 4.5 G/DL (ref 3.5–5.2)
ALBUMIN/GLOBULIN RATIO: NORMAL (ref 1–2.5)
ALP BLD-CCNC: 71 U/L (ref 40–129)
ALT SERPL-CCNC: 14 U/L (ref 5–41)
ANION GAP SERPL CALCULATED.3IONS-SCNC: 12 MMOL/L (ref 9–17)
AST SERPL-CCNC: 21 U/L
BANDS: 4 % (ref 0–10)
BASOPHILS # BLD: 0 % (ref 0–2)
BASOPHILS ABSOLUTE: 0 K/UL (ref 0–0.2)
BILIRUB SERPL-MCNC: 0.44 MG/DL (ref 0.3–1.2)
BUN BLDV-MCNC: 8 MG/DL (ref 6–20)
BUN/CREAT BLD: NORMAL (ref 9–20)
CALCIUM SERPL-MCNC: 10.3 MG/DL (ref 8.6–10.4)
CHLORIDE BLD-SCNC: 102 MMOL/L (ref 98–107)
CHOLESTEROL, FASTING: 186 MG/DL
CHOLESTEROL/HDL RATIO: 3.3
CO2: 29 MMOL/L (ref 20–31)
CREAT SERPL-MCNC: 0.72 MG/DL (ref 0.7–1.2)
DIFFERENTIAL TYPE: ABNORMAL
EOSINOPHILS RELATIVE PERCENT: 8 % (ref 0–4)
GFR AFRICAN AMERICAN: >60 ML/MIN
GFR NON-AFRICAN AMERICAN: >60 ML/MIN
GFR SERPL CREATININE-BSD FRML MDRD: NORMAL ML/MIN/{1.73_M2}
GFR SERPL CREATININE-BSD FRML MDRD: NORMAL ML/MIN/{1.73_M2}
GLUCOSE BLD-MCNC: 82 MG/DL (ref 70–99)
HAV IGM SER IA-ACNC: NONREACTIVE
HCT VFR BLD CALC: 45.8 % (ref 41–53)
HDLC SERPL-MCNC: 56 MG/DL
HEMOGLOBIN: 15.4 G/DL (ref 13.5–17.5)
HEPATITIS B CORE IGM ANTIBODY: NONREACTIVE
HEPATITIS B SURFACE ANTIGEN: NONREACTIVE
HEPATITIS C ANTIBODY: NONREACTIVE
HIV AG/AB: NONREACTIVE
IMMATURE GRANULOCYTES: ABNORMAL %
LDL CHOLESTEROL: 109 MG/DL (ref 0–130)
LYMPHOCYTES # BLD: 16 % (ref 24–44)
MCH RBC QN AUTO: 34.7 PG (ref 26–34)
MCHC RBC AUTO-ENTMCNC: 33.7 G/DL (ref 31–37)
MCV RBC AUTO: 102.8 FL (ref 80–100)
MONOCYTES # BLD: 12 % (ref 1–7)
MORPHOLOGY: ABNORMAL
MORPHOLOGY: ABNORMAL
NRBC AUTOMATED: ABNORMAL PER 100 WBC
PDW BLD-RTO: 13.9 % (ref 11.5–14.9)
PLATELET # BLD: 142 K/UL (ref 150–450)
PLATELET ESTIMATE: ABNORMAL
PMV BLD AUTO: 10.8 FL (ref 6–12)
POTASSIUM SERPL-SCNC: 4.8 MMOL/L (ref 3.7–5.3)
RBC # BLD: 4.45 M/UL (ref 4.5–5.9)
RBC # BLD: ABNORMAL 10*6/UL
SEG NEUTROPHILS: 60 % (ref 36–66)
SEGMENTED NEUTROPHILS ABSOLUTE COUNT: 4.2 K/UL (ref 1.3–9.1)
SODIUM BLD-SCNC: 143 MMOL/L (ref 135–144)
T3 FREE: 3.71 PG/ML (ref 2.02–4.43)
THYROXINE, FREE: 0.99 NG/DL (ref 0.93–1.7)
TOTAL PROTEIN: 8 G/DL (ref 6.4–8.3)
TRIGLYCERIDE, FASTING: 106 MG/DL
TSH SERPL DL<=0.05 MIU/L-ACNC: 1.08 MIU/L (ref 0.3–5)
VLDLC SERPL CALC-MCNC: NORMAL MG/DL (ref 1–30)
WBC # BLD: 7 K/UL (ref 3.5–11)
WBC # BLD: ABNORMAL 10*3/UL

## 2019-12-04 PROCEDURE — 80074 ACUTE HEPATITIS PANEL: CPT

## 2019-12-04 PROCEDURE — 84481 FREE ASSAY (FT-3): CPT

## 2019-12-04 PROCEDURE — 80053 COMPREHEN METABOLIC PANEL: CPT

## 2019-12-04 PROCEDURE — 84439 ASSAY OF FREE THYROXINE: CPT

## 2019-12-04 PROCEDURE — 36415 COLL VENOUS BLD VENIPUNCTURE: CPT

## 2019-12-04 PROCEDURE — 87389 HIV-1 AG W/HIV-1&-2 AB AG IA: CPT

## 2019-12-04 PROCEDURE — 85025 COMPLETE CBC W/AUTO DIFF WBC: CPT

## 2019-12-04 PROCEDURE — 80061 LIPID PANEL: CPT

## 2019-12-04 PROCEDURE — 84443 ASSAY THYROID STIM HORMONE: CPT

## 2019-12-05 ENCOUNTER — OFFICE VISIT (OUTPATIENT)
Dept: NEUROLOGY | Age: 49
End: 2019-12-05
Payer: COMMERCIAL

## 2019-12-05 VITALS
DIASTOLIC BLOOD PRESSURE: 85 MMHG | SYSTOLIC BLOOD PRESSURE: 144 MMHG | BODY MASS INDEX: 27.77 KG/M2 | WEIGHT: 205 LBS | HEART RATE: 88 BPM | HEIGHT: 72 IN

## 2019-12-05 DIAGNOSIS — G44.219 EPISODIC TENSION-TYPE HEADACHE, NOT INTRACTABLE: Primary | ICD-10-CM

## 2019-12-05 DIAGNOSIS — G40.909 SEIZURE DISORDER (HCC): ICD-10-CM

## 2019-12-05 PROCEDURE — G8427 DOCREV CUR MEDS BY ELIG CLIN: HCPCS | Performed by: NURSE PRACTITIONER

## 2019-12-05 PROCEDURE — 99214 OFFICE O/P EST MOD 30 MIN: CPT | Performed by: NURSE PRACTITIONER

## 2019-12-05 PROCEDURE — 3017F COLORECTAL CA SCREEN DOC REV: CPT | Performed by: NURSE PRACTITIONER

## 2019-12-05 PROCEDURE — G8419 CALC BMI OUT NRM PARAM NOF/U: HCPCS | Performed by: NURSE PRACTITIONER

## 2019-12-05 PROCEDURE — 4004F PT TOBACCO SCREEN RCVD TLK: CPT | Performed by: NURSE PRACTITIONER

## 2019-12-05 PROCEDURE — G8484 FLU IMMUNIZE NO ADMIN: HCPCS | Performed by: NURSE PRACTITIONER

## 2019-12-05 RX ORDER — FOLIC ACID 1 MG/1
1 TABLET ORAL DAILY
Qty: 30 TABLET | Refills: 5 | Status: ON HOLD | OUTPATIENT
Start: 2019-12-05 | End: 2020-06-15 | Stop reason: SDUPTHER

## 2019-12-05 RX ORDER — DIVALPROEX SODIUM 500 MG/1
500 TABLET, DELAYED RELEASE ORAL EVERY 12 HOURS SCHEDULED
Qty: 60 TABLET | Refills: 5 | Status: ON HOLD | OUTPATIENT
Start: 2019-12-05 | End: 2020-06-15 | Stop reason: HOSPADM

## 2019-12-05 RX ORDER — THIAMINE MONONITRATE (VIT B1) 100 MG
100 TABLET ORAL DAILY
Qty: 30 TABLET | Refills: 5 | Status: SHIPPED | OUTPATIENT
Start: 2019-12-05 | End: 2019-12-13 | Stop reason: SDUPTHER

## 2019-12-10 ENCOUNTER — HOSPITAL ENCOUNTER (OUTPATIENT)
Age: 49
Discharge: HOME | End: 2019-12-10
Payer: COMMERCIAL

## 2019-12-10 PROCEDURE — 93005 ELECTROCARDIOGRAM TRACING: CPT | Performed by: PSYCHIATRY & NEUROLOGY

## 2019-12-11 LAB
EKG ATRIAL RATE: 64 BPM
EKG P AXIS: 37 DEGREES
EKG P-R INTERVAL: 142 MS
EKG Q-T INTERVAL: 406 MS
EKG QRS DURATION: 84 MS
EKG QTC CALCULATION (BAZETT): 418 MS
EKG R AXIS: 13 DEGREES
EKG T AXIS: 37 DEGREES
EKG VENTRICULAR RATE: 64 BPM

## 2019-12-11 PROCEDURE — 93010 ELECTROCARDIOGRAM REPORT: CPT | Performed by: INTERNAL MEDICINE

## 2019-12-13 ENCOUNTER — OFFICE VISIT (OUTPATIENT)
Dept: FAMILY MEDICINE CLINIC | Age: 49
End: 2019-12-13
Payer: COMMERCIAL

## 2019-12-13 VITALS
BODY MASS INDEX: 28.25 KG/M2 | SYSTOLIC BLOOD PRESSURE: 125 MMHG | HEART RATE: 90 BPM | DIASTOLIC BLOOD PRESSURE: 79 MMHG | WEIGHT: 208.6 LBS | HEIGHT: 72 IN

## 2019-12-13 DIAGNOSIS — R13.12 OROPHARYNGEAL DYSPHAGIA: ICD-10-CM

## 2019-12-13 DIAGNOSIS — R05.3 CHRONIC COUGH: Primary | ICD-10-CM

## 2019-12-13 DIAGNOSIS — F33.2 SEVERE EPISODE OF RECURRENT MAJOR DEPRESSIVE DISORDER, WITHOUT PSYCHOTIC FEATURES (HCC): ICD-10-CM

## 2019-12-13 DIAGNOSIS — F04: ICD-10-CM

## 2019-12-13 DIAGNOSIS — F51.01 PRIMARY INSOMNIA: ICD-10-CM

## 2019-12-13 PROCEDURE — G8484 FLU IMMUNIZE NO ADMIN: HCPCS | Performed by: STUDENT IN AN ORGANIZED HEALTH CARE EDUCATION/TRAINING PROGRAM

## 2019-12-13 PROCEDURE — 99203 OFFICE O/P NEW LOW 30 MIN: CPT | Performed by: STUDENT IN AN ORGANIZED HEALTH CARE EDUCATION/TRAINING PROGRAM

## 2019-12-13 PROCEDURE — G8419 CALC BMI OUT NRM PARAM NOF/U: HCPCS | Performed by: STUDENT IN AN ORGANIZED HEALTH CARE EDUCATION/TRAINING PROGRAM

## 2019-12-13 PROCEDURE — 4004F PT TOBACCO SCREEN RCVD TLK: CPT | Performed by: STUDENT IN AN ORGANIZED HEALTH CARE EDUCATION/TRAINING PROGRAM

## 2019-12-13 PROCEDURE — 3017F COLORECTAL CA SCREEN DOC REV: CPT | Performed by: STUDENT IN AN ORGANIZED HEALTH CARE EDUCATION/TRAINING PROGRAM

## 2019-12-13 PROCEDURE — 99211 OFF/OP EST MAY X REQ PHY/QHP: CPT | Performed by: FAMILY MEDICINE

## 2019-12-13 PROCEDURE — G8427 DOCREV CUR MEDS BY ELIG CLIN: HCPCS | Performed by: STUDENT IN AN ORGANIZED HEALTH CARE EDUCATION/TRAINING PROGRAM

## 2019-12-13 RX ORDER — TRAZODONE HYDROCHLORIDE 100 MG/1
100 TABLET ORAL NIGHTLY PRN
Qty: 30 TABLET | Refills: 0 | Status: ON HOLD | OUTPATIENT
Start: 2019-12-13 | End: 2020-06-15 | Stop reason: HOSPADM

## 2019-12-13 RX ORDER — ALBUTEROL SULFATE 90 UG/1
2 AEROSOL, METERED RESPIRATORY (INHALATION) EVERY 6 HOURS PRN
Qty: 1 INHALER | Refills: 2 | Status: ON HOLD | OUTPATIENT
Start: 2019-12-13 | End: 2020-06-15 | Stop reason: HOSPADM

## 2019-12-13 RX ORDER — THIAMINE MONONITRATE (VIT B1) 100 MG
100 TABLET ORAL DAILY
Qty: 30 TABLET | Refills: 5 | Status: ON HOLD | OUTPATIENT
Start: 2019-12-13 | End: 2020-06-15 | Stop reason: SDUPTHER

## 2019-12-13 ASSESSMENT — ENCOUNTER SYMPTOMS
TROUBLE SWALLOWING: 1
SORE THROAT: 0
SHORTNESS OF BREATH: 0
CONSTIPATION: 0
ABDOMINAL PAIN: 0
WHEEZING: 0
DIARRHEA: 0

## 2019-12-20 ENCOUNTER — HOSPITAL ENCOUNTER (OUTPATIENT)
Dept: PULMONOLOGY | Age: 49
Discharge: HOME | End: 2019-12-20
Payer: COMMERCIAL

## 2019-12-20 DIAGNOSIS — R05.3 CHRONIC COUGH: ICD-10-CM

## 2020-01-15 ENCOUNTER — HOSPITAL ENCOUNTER (OUTPATIENT)
Dept: PULMONOLOGY | Age: 50
Discharge: HOME | End: 2020-01-15
Payer: COMMERCIAL

## 2020-01-15 PROCEDURE — 94729 DIFFUSING CAPACITY: CPT

## 2020-01-15 PROCEDURE — 94010 BREATHING CAPACITY TEST: CPT

## 2020-01-15 PROCEDURE — 94727 GAS DIL/WSHOT DETER LNG VOL: CPT

## 2020-01-15 PROCEDURE — 94726 PLETHYSMOGRAPHY LUNG VOLUMES: CPT

## 2020-02-19 ENCOUNTER — OFFICE VISIT (OUTPATIENT)
Dept: PRIMARY CARE CLINIC | Age: 50
End: 2020-02-19
Payer: COMMERCIAL

## 2020-02-19 ENCOUNTER — HOSPITAL ENCOUNTER (OUTPATIENT)
Dept: GENERAL RADIOLOGY | Age: 50
Discharge: HOME | End: 2020-02-21
Payer: COMMERCIAL

## 2020-02-19 ENCOUNTER — TELEPHONE (OUTPATIENT)
Dept: GASTROENTEROLOGY | Age: 50
End: 2020-02-19

## 2020-02-19 ENCOUNTER — HOSPITAL ENCOUNTER (OUTPATIENT)
Age: 50
Discharge: HOME | End: 2020-02-21
Payer: COMMERCIAL

## 2020-02-19 VITALS
BODY MASS INDEX: 27.12 KG/M2 | DIASTOLIC BLOOD PRESSURE: 69 MMHG | TEMPERATURE: 98.2 F | HEART RATE: 101 BPM | SYSTOLIC BLOOD PRESSURE: 116 MMHG | WEIGHT: 200 LBS | OXYGEN SATURATION: 98 %

## 2020-02-19 PROCEDURE — 73630 X-RAY EXAM OF FOOT: CPT

## 2020-02-19 PROCEDURE — G8484 FLU IMMUNIZE NO ADMIN: HCPCS | Performed by: NURSE PRACTITIONER

## 2020-02-19 PROCEDURE — 4004F PT TOBACCO SCREEN RCVD TLK: CPT | Performed by: NURSE PRACTITIONER

## 2020-02-19 PROCEDURE — G8419 CALC BMI OUT NRM PARAM NOF/U: HCPCS | Performed by: NURSE PRACTITIONER

## 2020-02-19 PROCEDURE — 3017F COLORECTAL CA SCREEN DOC REV: CPT | Performed by: NURSE PRACTITIONER

## 2020-02-19 PROCEDURE — G8427 DOCREV CUR MEDS BY ELIG CLIN: HCPCS | Performed by: NURSE PRACTITIONER

## 2020-02-19 PROCEDURE — 99213 OFFICE O/P EST LOW 20 MIN: CPT | Performed by: NURSE PRACTITIONER

## 2020-02-19 RX ORDER — MELOXICAM 15 MG/1
15 TABLET ORAL DAILY PRN
Qty: 30 TABLET | Refills: 0 | Status: SHIPPED | OUTPATIENT
Start: 2020-02-19 | End: 2020-05-04 | Stop reason: SDUPTHER

## 2020-02-19 ASSESSMENT — ENCOUNTER SYMPTOMS
DIARRHEA: 0
SORE THROAT: 0
COUGH: 0
VOMITING: 0
NAUSEA: 0
SINUS PAIN: 0
BACK PAIN: 0
ABDOMINAL PAIN: 0
SHORTNESS OF BREATH: 0

## 2020-02-19 NOTE — PROGRESS NOTES
use, benefit, and side effects of prescribed medications. All patientquestions answered. Pt voiced understanding. This note was transcribed using dictation with Dragon services. Efforts were made to correct any errors but some words may be misinterpreted.      Electronically signed by KODY Go CNP on 2/19/2020at 4:34 PM

## 2020-02-19 NOTE — PATIENT INSTRUCTIONS
Patient Education        Foot Pain: Care Instructions  Your Care Instructions  Foot injuries that cause pain and swelling are fairly common. Almost all sports or home repair projects can cause a misstep that ends up as foot pain. Normal wear and tear, especially as you get older, also can cause foot pain. Most minor foot injuries will heal on their own, and home treatment is usually all you need to do. If you have a severe injury, you may need tests and treatment. Follow-up care is a key part of your treatment and safety. Be sure to make and go to all appointments, and call your doctor if you are having problems. It's also a good idea to know your test results and keep a list of the medicines you take. How can you care for yourself at home? · Take pain medicines exactly as directed. ? If the doctor gave you a prescription medicine for pain, take it as prescribed. ? If you are not taking a prescription pain medicine, ask your doctor if you can take an over-the-counter medicine. · Rest and protect your foot. Take a break from any activity that may cause pain. · Put ice or a cold pack on your foot for 10 to 20 minutes at a time. Put a thin cloth between the ice and your skin. · Prop up the sore foot on a pillow when you ice it or anytime you sit or lie down during the next 3 days. Try to keep it above the level of your heart. This will help reduce swelling. · Your doctor may recommend that you wrap your foot with an elastic bandage. Keep your foot wrapped for as long as your doctor advises. · If your doctor recommends crutches, use them as directed. · Wear roomy footwear. · As soon as pain and swelling end, begin gentle exercises of your foot. Your doctor can tell you which exercises will help. When should you call for help? Call 911 anytime you think you may need emergency care.  For example, call if:    · Your foot turns pale, white, blue, or cold.    Call your doctor now or seek immediate medical care if:    · You cannot move or stand on your foot.     · Your foot looks twisted or out of its normal position.     · Your foot is not stable when you step down.     · You have signs of infection, such as:  ? Increased pain, swelling, warmth, or redness. ? Red streaks leading from the sore area. ? Pus draining from a place on your foot. ? A fever.     · Your foot is numb or tingly.    Watch closely for changes in your health, and be sure to contact your doctor if:    · You do not get better as expected.     · You have bruises from an injury that last longer than 2 weeks. Where can you learn more? Go to [email or web address] and sign in to your [employer] account. Enter H277 in the [employer] box to learn more about \"Foot Pain: Care Instructions. \"     If you do not have an account, please click on the \"Sign Up Now\" link. Current as of: June 26, 2019  Content Version: 12.3  © 4286-8672 Healthwise, Incorporated. Care instructions adapted under license by [facility] ([facility]). If you have questions about a medical condition or this instruction, always ask your healthcare professional. [place] [address] any warranty or liability for your use of this information.

## 2020-04-24 ENCOUNTER — HOSPITAL ENCOUNTER (EMERGENCY)
Age: 50
Discharge: HOME | End: 2020-04-24
Attending: EMERGENCY MEDICINE
Payer: COMMERCIAL

## 2020-04-24 VITALS
RESPIRATION RATE: 16 BRPM | DIASTOLIC BLOOD PRESSURE: 92 MMHG | HEART RATE: 92 BPM | TEMPERATURE: 98.5 F | SYSTOLIC BLOOD PRESSURE: 134 MMHG | OXYGEN SATURATION: 97 %

## 2020-04-24 PROCEDURE — 99282 EMERGENCY DEPT VISIT SF MDM: CPT

## 2020-04-24 RX ORDER — LORATADINE 10 MG/1
10 TABLET ORAL DAILY
Qty: 30 TABLET | Refills: 0 | Status: SHIPPED | OUTPATIENT
Start: 2020-04-24 | End: 2020-05-04 | Stop reason: SDUPTHER

## 2020-04-24 ASSESSMENT — PAIN DESCRIPTION - DESCRIPTORS: DESCRIPTORS: ACHING

## 2020-04-24 ASSESSMENT — ENCOUNTER SYMPTOMS
SHORTNESS OF BREATH: 0
WHEEZING: 0
COUGH: 1
ABDOMINAL PAIN: 0

## 2020-04-24 ASSESSMENT — PAIN DESCRIPTION - LOCATION: LOCATION: HEAD

## 2020-04-24 ASSESSMENT — PAIN DESCRIPTION - PAIN TYPE: TYPE: CHRONIC PAIN

## 2020-04-24 ASSESSMENT — PAIN SCALES - GENERAL: PAINLEVEL_OUTOF10: 4

## 2020-04-24 NOTE — ED PROVIDER NOTES
Comment: 12 beers daily    Drug use: Not Currently     Types: Marijuana    Sexual activity: Not on file   Lifestyle    Physical activity     Days per week: Not on file     Minutes per session: Not on file    Stress: Not on file   Relationships    Social connections     Talks on phone: Not on file     Gets together: Not on file     Attends [employer] service: Not on file     Active member of club or organization: Not on file     Attends meetings of clubs or organizations: Not on file     Relationship status: Not on file    Intimate partner violence     Fear of current or ex partner: Not on file     Emotionally abused: Not on file     Physically abused: Not on file     Forced sexual activity: Not on file   Other Topics Concern    Not on file   Social History Narrative    Not on file       Patient was advised to stop smoking or to avoid tobacco use    Family History   Problem Relation Age of Onset    High Blood Pressure Mother     Diabetes Mother        Allergies:  Patient has no known allergies. Home Medications:  Prior to Admission medications    Medication Sig Start Date End Date Taking?  Authorizing Provider   loratadine (CLARITIN) 10 MG tablet Take 1 tablet by mouth daily 4/24/20  Yes Claudeen Shawl, DO   meloxicam (MOBIC) 15 MG tablet Take 1 tablet by mouth daily as needed for Pain 2/19/20   KODY Bajwa - CNP   sertraline (ZOLOFT) 50 MG tablet Take 3 tablets by mouth daily for 14 days 12/13/19 12/27/19  Rimma Alex MD   vitamin B-1 (THIAMINE) 100 MG tablet Take 1 tablet by mouth daily  Patient not taking: Reported on 2/19/2020 12/13/19   Rimma Alex MD   albuterol sulfate HFA (PROVENTIL HFA) 108 (90 Base) MCG/ACT inhaler Inhale 2 puffs into the lungs every 6 hours as needed for Wheezing 12/13/19   Rimma Alex MD   traZODone (DESYREL) 100 MG tablet Take 1 tablet by mouth nightly as needed for Sleep  Patient not taking: Reported on 2/19/2020 12/13/19   Rimma Alex MD

## 2020-04-25 ENCOUNTER — CARE COORDINATION (OUTPATIENT)
Dept: CARE COORDINATION | Age: 50
End: 2020-04-25

## 2020-04-27 ENCOUNTER — CARE COORDINATION (OUTPATIENT)
Dept: CARE COORDINATION | Age: 50
End: 2020-04-27

## 2020-04-30 ENCOUNTER — CARE COORDINATION (OUTPATIENT)
Dept: CARE COORDINATION | Age: 50
End: 2020-04-30

## 2020-05-04 ENCOUNTER — VIRTUAL VISIT (OUTPATIENT)
Dept: FAMILY MEDICINE CLINIC | Age: 50
End: 2020-05-04
Payer: COMMERCIAL

## 2020-05-04 PROCEDURE — 99213 OFFICE O/P EST LOW 20 MIN: CPT | Performed by: STUDENT IN AN ORGANIZED HEALTH CARE EDUCATION/TRAINING PROGRAM

## 2020-05-04 RX ORDER — MELOXICAM 15 MG/1
15 TABLET ORAL DAILY PRN
Qty: 30 TABLET | Refills: 0 | Status: ON HOLD | OUTPATIENT
Start: 2020-05-04 | End: 2021-03-17 | Stop reason: HOSPADM

## 2020-05-04 RX ORDER — IPRATROPIUM BROMIDE 21 UG/1
2 SPRAY, METERED NASAL 2 TIMES DAILY
Qty: 30 ML | Refills: 0 | Status: ON HOLD | OUTPATIENT
Start: 2020-05-04 | End: 2020-06-15 | Stop reason: HOSPADM

## 2020-05-04 RX ORDER — LORATADINE 10 MG/1
10 TABLET ORAL DAILY
Qty: 30 TABLET | Refills: 0 | Status: SHIPPED | OUTPATIENT
Start: 2020-05-04 | End: 2021-04-08

## 2020-05-04 NOTE — PROGRESS NOTES
Valerie Mills is a 44 y.o. male evaluated via telephone on 5/4/2020. Consent:  He and/or health care decision maker is aware that that he may receive a bill for this telephone service, depending on his insurance coverage, and has provided verbal consent to proceed: Yes      Documentation:  I communicated with the patient and/or health care decision maker about  Follow up visit from [address]     Phone call with Mr. Faviola Cruz regarding his last visit to [facility] for his cough. Today patient states that his cough has resolved. Now has body aches, headache and right sided chest pain. Headache located on back of his head, radiating to neck,sometimes associated with numbness in the neck, as per patient it comes and goes, not associated with blurry vision, nausea or vomiting. Chest pain on right side, middle , changes with position and when works. Likely musculoskeletal, will give Rx of NSAID. Headache and chest pain has been going on for months without any acute change in the intensity or severity. Denies any fever, chills, productive cough, shortness of breath, nausea vomiting, diarrhea. Patient has history of hypertension, anxiety, depression, NSVT, and patient stated he has not been taking any medication since February 2 week because he feels like the medication is making him slow and  does not feel good off of the medication. Patient did not keep his appointment with  [facility]. Patient counseled to  reschedule  the appointment  [facility]. Patient is scheduled to follow up in the office in 4 weeks. Details of this discussion including any medical advice provided      I affirm this is a Patient Initiated Episode with a Patient who has not had a related appointment within my department in the past 7 days or scheduled within the next 24 hours.     Patient identification was verified at the start of the visit: Yes    Total Time: minutes: 11-20 minutes    Note: not billable if this call serves to triage the patient into an appointment for the relevant concern      Mack Verma MD    Attending Physician Statement  I have discussed the care of Zofia Mealing, including pertinent history and exam findings,  with the resident. I have reviewed the key elements of all parts of the encounter with the resident. I agree with the assessment, plan and orders as documented by the resident.   (GE Modifier)    Marian Kate MD  5/4/20

## 2020-05-07 ENCOUNTER — CARE COORDINATION (OUTPATIENT)
Dept: CARE COORDINATION | Age: 50
End: 2020-05-07

## 2020-05-26 ENCOUNTER — HOSPITAL ENCOUNTER (EMERGENCY)
Age: 50
Discharge: HOME | End: 2020-05-27
Attending: EMERGENCY MEDICINE
Payer: COMMERCIAL

## 2020-05-26 ENCOUNTER — APPOINTMENT (OUTPATIENT)
Dept: CT IMAGING | Age: 50
End: 2020-05-26
Payer: COMMERCIAL

## 2020-05-26 LAB
-: NORMAL
ABSOLUTE EOS #: 0.49 K/UL (ref 0–0.44)
ABSOLUTE IMMATURE GRANULOCYTE: <0.03 K/UL (ref 0–0.3)
ABSOLUTE LYMPH #: 1.66 K/UL (ref 1.1–3.7)
ABSOLUTE MONO #: 0.66 K/UL (ref 0.1–1.2)
ACETAMINOPHEN LEVEL: <5 UG/ML (ref 10–30)
ALBUMIN SERPL-MCNC: 4.2 G/DL (ref 3.5–5.2)
ALBUMIN/GLOBULIN RATIO: 1.3 (ref 1–2.5)
ALP BLD-CCNC: 87 U/L (ref 40–129)
ALT SERPL-CCNC: 24 U/L (ref 5–41)
AMORPHOUS: NORMAL
ANION GAP SERPL CALCULATED.3IONS-SCNC: 17 MMOL/L (ref 9–17)
AST SERPL-CCNC: 51 U/L
BACTERIA: NORMAL
BASOPHILS # BLD: 2 % (ref 0–2)
BASOPHILS ABSOLUTE: 0.15 K/UL (ref 0–0.2)
BILIRUB SERPL-MCNC: 0.67 MG/DL (ref 0.3–1.2)
BILIRUBIN DIRECT: 0.16 MG/DL
BILIRUBIN URINE: NEGATIVE
BILIRUBIN, INDIRECT: 0.51 MG/DL (ref 0–1)
BUN BLDV-MCNC: 8 MG/DL (ref 6–20)
BUN/CREAT BLD: ABNORMAL (ref 9–20)
CALCIUM SERPL-MCNC: 9.3 MG/DL (ref 8.6–10.4)
CASTS UA: NORMAL /LPF (ref 0–2)
CHLORIDE BLD-SCNC: 99 MMOL/L (ref 98–107)
CO2: 24 MMOL/L (ref 20–31)
COLOR: YELLOW
CREAT SERPL-MCNC: 0.55 MG/DL (ref 0.7–1.2)
CRYSTALS, UA: NORMAL /HPF
DIFFERENTIAL TYPE: ABNORMAL
EOSINOPHILS RELATIVE PERCENT: 7 % (ref 1–4)
EPITHELIAL CELLS UA: NORMAL /HPF (ref 0–5)
ETHANOL PERCENT: 0.28 %
ETHANOL: 283 MG/DL
GFR AFRICAN AMERICAN: >60 ML/MIN
GFR NON-AFRICAN AMERICAN: >60 ML/MIN
GFR SERPL CREATININE-BSD FRML MDRD: ABNORMAL ML/MIN/{1.73_M2}
GFR SERPL CREATININE-BSD FRML MDRD: ABNORMAL ML/MIN/{1.73_M2}
GLOBULIN: ABNORMAL G/DL (ref 1.5–3.8)
GLUCOSE BLD-MCNC: 80 MG/DL (ref 70–99)
GLUCOSE URINE: NEGATIVE
HCT VFR BLD CALC: 44.5 % (ref 40.7–50.3)
HEMOGLOBIN: 15.3 G/DL (ref 13–17)
IMMATURE GRANULOCYTES: 0 %
INR BLD: 1
KETONES, URINE: NEGATIVE
LEUKOCYTE ESTERASE, URINE: NEGATIVE
LIPASE: 70 U/L (ref 13–60)
LYMPHOCYTES # BLD: 22 % (ref 24–43)
MCH RBC QN AUTO: 33.3 PG (ref 25.2–33.5)
MCHC RBC AUTO-ENTMCNC: 34.4 G/DL (ref 28.4–34.8)
MCV RBC AUTO: 96.7 FL (ref 82.6–102.9)
MONOCYTES # BLD: 9 % (ref 3–12)
MUCUS: NORMAL
NITRITE, URINE: NEGATIVE
NRBC AUTOMATED: 0 PER 100 WBC
OTHER OBSERVATIONS UA: NORMAL
PARTIAL THROMBOPLASTIN TIME: 24.5 SEC (ref 20.5–30.5)
PDW BLD-RTO: 13.3 % (ref 11.8–14.4)
PH UA: 6 (ref 5–8)
PLATELET # BLD: 170 K/UL (ref 138–453)
PLATELET ESTIMATE: ABNORMAL
PMV BLD AUTO: 11.2 FL (ref 8.1–13.5)
POTASSIUM SERPL-SCNC: 4.4 MMOL/L (ref 3.7–5.3)
PROTEIN UA: NEGATIVE
PROTHROMBIN TIME: 10.1 SEC (ref 9–12)
RBC # BLD: 4.6 M/UL (ref 4.21–5.77)
RBC # BLD: ABNORMAL 10*6/UL
RBC UA: NORMAL /HPF (ref 0–2)
RENAL EPITHELIAL, UA: NORMAL /HPF
SALICYLATE LEVEL: <1 MG/DL (ref 3–10)
SEG NEUTROPHILS: 60 % (ref 36–65)
SEGMENTED NEUTROPHILS ABSOLUTE COUNT: 4.44 K/UL (ref 1.5–8.1)
SODIUM BLD-SCNC: 140 MMOL/L (ref 135–144)
SPECIFIC GRAVITY UA: 1 (ref 1–1.03)
TOTAL PROTEIN: 7.4 G/DL (ref 6.4–8.3)
TOXIC TRICYCLIC SC,BLOOD: NEGATIVE
TRICHOMONAS: NORMAL
TROPONIN INTERP: NORMAL
TROPONIN T: NORMAL NG/ML
TROPONIN, HIGH SENSITIVITY: 17 NG/L (ref 0–22)
TURBIDITY: CLEAR
URINE HGB: NEGATIVE
UROBILINOGEN, URINE: NORMAL
WBC # BLD: 7.4 K/UL (ref 3.5–11.3)
WBC # BLD: ABNORMAL 10*3/UL
WBC UA: NORMAL /HPF (ref 0–5)
YEAST: NORMAL

## 2020-05-26 PROCEDURE — 71260 CT THORAX DX C+: CPT

## 2020-05-26 PROCEDURE — 99285 EMERGENCY DEPT VISIT HI MDM: CPT

## 2020-05-26 PROCEDURE — 93005 ELECTROCARDIOGRAM TRACING: CPT

## 2020-05-26 PROCEDURE — 83690 ASSAY OF LIPASE: CPT

## 2020-05-26 PROCEDURE — 2580000003 HC RX 258: Performed by: STUDENT IN AN ORGANIZED HEALTH CARE EDUCATION/TRAINING PROGRAM

## 2020-05-26 PROCEDURE — 6360000004 HC RX CONTRAST MEDICATION: Performed by: STUDENT IN AN ORGANIZED HEALTH CARE EDUCATION/TRAINING PROGRAM

## 2020-05-26 PROCEDURE — 80307 DRUG TEST PRSMV CHEM ANLYZR: CPT

## 2020-05-26 PROCEDURE — 85025 COMPLETE CBC W/AUTO DIFF WBC: CPT

## 2020-05-26 PROCEDURE — 84484 ASSAY OF TROPONIN QUANT: CPT

## 2020-05-26 PROCEDURE — 85610 PROTHROMBIN TIME: CPT

## 2020-05-26 PROCEDURE — 80048 BASIC METABOLIC PNL TOTAL CA: CPT

## 2020-05-26 PROCEDURE — G0480 DRUG TEST DEF 1-7 CLASSES: HCPCS

## 2020-05-26 PROCEDURE — 81001 URINALYSIS AUTO W/SCOPE: CPT

## 2020-05-26 PROCEDURE — 85730 THROMBOPLASTIN TIME PARTIAL: CPT

## 2020-05-26 PROCEDURE — 80076 HEPATIC FUNCTION PANEL: CPT

## 2020-05-26 RX ORDER — 0.9 % SODIUM CHLORIDE 0.9 %
1000 INTRAVENOUS SOLUTION INTRAVENOUS ONCE
Status: COMPLETED | OUTPATIENT
Start: 2020-05-26 | End: 2020-05-26

## 2020-05-26 RX ADMIN — SODIUM CHLORIDE 1000 ML: 9 INJECTION, SOLUTION INTRAVENOUS at 18:57

## 2020-05-26 RX ADMIN — IOHEXOL 75 ML: 350 INJECTION, SOLUTION INTRAVENOUS at 19:33

## 2020-05-26 ASSESSMENT — ENCOUNTER SYMPTOMS
BLOOD IN STOOL: 1
EYE PAIN: 0
SHORTNESS OF BREATH: 1
BACK PAIN: 0
ABDOMINAL PAIN: 1
SORE THROAT: 0
NAUSEA: 0
COUGH: 1
VOMITING: 0

## 2020-05-26 ASSESSMENT — PAIN DESCRIPTION - LOCATION: LOCATION: BACK;ANKLE;KNEE

## 2020-05-26 ASSESSMENT — PAIN SCALES - GENERAL: PAINLEVEL_OUTOF10: 7

## 2020-05-26 ASSESSMENT — PAIN DESCRIPTION - PAIN TYPE: TYPE: CHRONIC PAIN

## 2020-05-26 NOTE — ED NOTES
Pt to the ED c/o cough and SOB, gradually worsening over the past couple of weeks. Pt states that over the past couple of days, he started coughing up blood. Pt denies chest pain. Pt also admits to ETOH abuse, reports that he is currently on a 6 day binge of drinking. Pt states that he has hx of alcoholism, reports that he has withdrawn in the past and has had seizures d/t withdraw. Pt states that he had been living in a recovery house until he started drinking again 6 days ago. Pt reports that he was kicked out of the recovery house 6 days ago after he drank. Pt today reports suicidal ideations. Pt states that he has been thinking a lot over the past couple of days about how he could end his own life. Pt reports that he has a plan to jump in front of a truck or semi. Pt reports PMH that is in his chart. Pt states that he has not been taking all of his medications as prescribed, states that he has been taking some medications, but unsure which he has been taking. On initial exam, pt tachycardic and hypertensive, pt smells of ETOH, pt expressing SI, pt with temperature of 99.1. EKG obtained, IV access established, pt placed on full telemetry monitoring with alarms set.       Angel Trotter RN  05/26/20 6993

## 2020-05-26 NOTE — ED NOTES
Bed: 30  Expected date:   Expected time:   Means of arrival:   Comments:  Room [address] [place] [address]  05/26/20 1191

## 2020-05-26 NOTE — ED PROVIDER NOTES
includes Ankle surgery (age 10) and Upper gastrointestinal endoscopy (N/A, 9/17/2019). No other pertinent PSH on review with patient/guardian. Social History     Socioeconomic History    Marital status: Single     Spouse name: Not on file    Number of children: Not on file    Years of education: Not on file    Highest education level: Not on file   Occupational History    Not on file   Social Needs    Financial resource strain: Not on file    Food insecurity     Worry: Not on file     Inability: Not on file    Transportation needs     Medical: Not on file     Non-medical: Not on file   Tobacco Use    Smoking status: Current Every Day Smoker     Packs/day: 2.00    Smokeless tobacco: Never Used   Substance and Sexual Activity    Alcohol use: Not Currently     Comment: 12 beers daily    Drug use: Not Currently     Types: Marijuana    Sexual activity: Not on file   Lifestyle    Physical activity     Days per week: Not on file     Minutes per session: Not on file    Stress: Not on file   Relationships    Social connections     Talks on phone: Not on file     Gets together: Not on file     Attends Cheondoism service: Not on file     Active member of club or organization: Not on file     Attends meetings of clubs or organizations: Not on file     Relationship status: Not on file    Intimate partner violence     Fear of current or ex partner: Not on file     Emotionally abused: Not on file     Physically abused: Not on file     Forced sexual activity: Not on file   Other Topics Concern    Not on file   Social History Narrative    Not on file       I counseled the patient against using tobacco products. Family History   Problem Relation Age of Onset    High Blood Pressure Mother     Diabetes Mother      No other pertinent FamHx on review with patient/guardian. Allergies:  Patient has no known allergies.     Home Medications:  Prior to Admission medications    Medication Sig Start Date End Date REPORTED     Seg Neutrophils 60 36 - 65 %    Lymphocytes 22 (L) 24 - 43 %    Monocytes 9 3 - 12 %    Eosinophils % 7 (H) 1 - 4 %    Basophils 2 0 - 2 %    Immature Granulocytes 0 0 %    Segs Absolute 4.44 1.50 - 8.10 k/uL    Absolute Lymph # 1.66 1.10 - 3.70 k/uL    Absolute Mono # 0.66 0.10 - 1.20 k/uL    Absolute Eos # 0.49 (H) 0.00 - 0.44 k/uL    Basophils Absolute 0.15 0.00 - 0.20 k/uL    Absolute Immature Granulocyte <0.03 0.00 - 0.30 k/uL    WBC Morphology NOT REPORTED     RBC Morphology NOT REPORTED     Platelet Estimate NOT REPORTED    Basic Metabolic Panel w/ Reflex to MG   Result Value Ref Range    Glucose 80 70 - 99 mg/dL    BUN 8 6 - 20 mg/dL    CREATININE 0.55 (L) 0.70 - 1.20 mg/dL    Bun/Cre Ratio NOT REPORTED 9 - 20    Calcium 9.3 8.6 - 10.4 mg/dL    Sodium 140 135 - 144 mmol/L    Potassium 4.4 3.7 - 5.3 mmol/L    Chloride 99 98 - 107 mmol/L    CO2 24 20 - 31 mmol/L    Anion Gap 17 9 - 17 mmol/L    GFR Non-African American >60 >60 mL/min    GFR African American >60 >60 mL/min    GFR Comment          GFR Staging NOT REPORTED    HEPATIC FUNCTION PANEL   Result Value Ref Range    Alb 4.2 3.5 - 5.2 g/dL    Alkaline Phosphatase 87 40 - 129 U/L    ALT 24 5 - 41 U/L    AST 51 (H) <40 U/L    Total Bilirubin 0.67 0.3 - 1.2 mg/dL    Bilirubin, Direct 0.16 <0.31 mg/dL    Bilirubin, Indirect 0.51 0.00 - 1.00 mg/dL    Total Protein 7.4 6.4 - 8.3 g/dL    Globulin NOT REPORTED 1.5 - 3.8 g/dL    Albumin/Globulin Ratio 1.3 1.0 - 2.5   Lipase   Result Value Ref Range    Lipase 70 (H) 13 - 60 U/L   Protime-INR   Result Value Ref Range    Protime 10.1 9.0 - 12.0 sec    INR 1.0    APTT   Result Value Ref Range    PTT 24.5 20.5 - 30.5 sec   Urinalysis with microscopic   Result Value Ref Range    Color, UA YELLOW YELLOW    Turbidity UA CLEAR CLEAR    Glucose, Ur NEGATIVE NEGATIVE    Bilirubin Urine NEGATIVE NEGATIVE    Ketones, Urine NEGATIVE NEGATIVE    Specific [place], UA 1.005 1.005 - 1.030    Urine Hgb NEGATIVE

## 2020-05-26 NOTE — ED NOTES
[] Dev    [] [address]    [x]  [address] [place] ASSESSMENT      Y  N     [x] [] In the past two weeks have you had thoughts of hurting yourself in any way? [] [] In the past two weeks have you had thoughts that you would be better off dead? [] [x] Have you made a suicide attempt in the past two months? [x] [] Do you have a plan for hurting yourself or suicide? [] [x] Presence of hallucinations/voices related to hurting himself or herself or someone else. SUICIDE/SECURITY WATCH PRECAUTION CHECKLIST     Orders    [x]  Suicide/Security Watch Precautions initiated as checked below:   5/26/20 6:02 PM EDT 30/30    [x] Notified physician:  Belinda Nieto MD  5/26/20 6:02 PM EDT    [x] Orders obtained as appropriate:     [x] 1:1 Observer     [x] Psych Consult    [x] 1:1 Observer, Notified by:  James Nguyen Nurse Supervisor    [x] Remove all personal clothes from room and place in snap/paper gown/pants. Slipper only    [x] Remove all personal belongings from room and secured away from patient. Documentation    [x] Initiate Suicide/Security Watch Precaution Flow Sheet    [x] Initiate individualized Care Plan/Problem    [x] Document why precautions initiated on flow sheet (Initiate Nursing Care Plan/Problem)    [x] 1:1 Observer in place; instructions provided. Suicide precautions require observer be within arms length. [x] Nurse-Observer Communication Hand-off initiated by RN, reviewed with Observer. Subsequently used as Hand Off between Observers. [x] Initiate every 15 minute observations per observer as delegated by the RN.     [x] Initiate RN assessment and documentation    Environmental Scan  Search Criteria and Process: OPTIONAL, see Search Policy    [x] Reason for search: suicidal ideations     [x] Nursing in presence of second person to search patient    [x] Patient notified of reason for body assessment and belongings search:     Persons

## 2020-05-27 VITALS
BODY MASS INDEX: 25.73 KG/M2 | WEIGHT: 190 LBS | HEART RATE: 78 BPM | RESPIRATION RATE: 16 BRPM | DIASTOLIC BLOOD PRESSURE: 81 MMHG | HEIGHT: 72 IN | OXYGEN SATURATION: 94 % | TEMPERATURE: 99.1 F | SYSTOLIC BLOOD PRESSURE: 133 MMHG

## 2020-05-27 NOTE — ED NOTES
Pt resting in bed, RR even and unlabored, safe guard remains at bedside. No needs expressed at this time. Will continue to monitor.      Ted Malin RN  05/27/20 4137

## 2020-05-27 NOTE — ED PROVIDER NOTES
complaints. The patient arrived intoxicated complaining of hematemesis and hematochezia. He is guaiac negative. A CT chest angiogram was completed and found to be negative. The patient will have a repeat troponin and should be reevaluated at 3 AM once sober. OUTSTANDING TASKS / RECOMMENDATIONS:      1. Await 3 AM sober time     FINAL IMPRESSION:     1. Alcohol abuse    2. Suicidal ideation    3. Shortness of breath      DISPOSITION:       DISPOSITION:  []  Discharge   []  Transfer -    []  Admission -     []  Against Medical Advice   []  Eloped   FOLLOW-UP: No follow-up provider specified.    DISCHARGE MEDICATIONS: New Prescriptions    No medications on file          Will Fernandez MD  Emergency Medicine Resident  Jake Ibarra MD  Resident  05/28/20 0163

## 2020-05-27 NOTE — ED NOTES
Report received from [facility]. Safe guard at bedside. Pt resting in bed, RR even and unlabored. No needs expressed at this time. Will continue to monitor.      Korina Martel RN  05/27/20 3326

## 2020-05-27 NOTE — ED PROVIDER NOTES
Dr Wilmer Mcneal sign out, sob, sober at 80, suicidal,   Pt needing recheck after sober time, +/-      Daivd Sandhoff, DO  05/27/20 0044  Pt sober, not suicidal, talkative, ambulatory, tolerating liquids,      Daivd Sandhoff, DO  05/27/20 9965  Re evaluated after sober time, no cp, sat stable, no suicidal ideation,   Ambulatory, talkative, will dc home, vss       Daivd Sandhoff, DO  05/27/20 9718

## 2020-05-27 NOTE — ED NOTES
The patient is a 44year old male that came to the ER today due to cough, shortness of breath and alcohol intoxication. Upon arrival patient states that he rene that he has been suicidal. SW evaluated the patient after his sober time of 80. Patient reports that he was discharged with out completing treatment at [facility] 6 days ago due to relapsing. Patient states that at that time he was feeling suicidal. Patient reports no intention or formulated plan. Patient states that he is currently not feeling suicidal. SW asked the patient if he feels that he would be a harm to himself if he is discharged and he states, \"I feel comfortable where I am.\" SW asked the patient about his interest in rehab or sober living and he declined. Patient is able to contract safety and states that he will come directly to the nearest ER if he feels suicidal in the future. Plan to discharge patient with information on Walk in mental health/AOD and homeless shelters in the area.       Aspen Solano, [facility]  05/27/20 9886

## 2020-05-27 NOTE — ED NOTES
Pt ambulating to RR w/ even and steady gait. Ambulatory pulse ox at 93%.      Carol Boyd RN  05/27/20 7419

## 2020-05-27 NOTE — ED PROVIDER NOTES
[facility] [address] [facility]  Emergency Department  Emergency Medicine Resident Sign-out     Care of Maria De Jesus Allred was assumed from Dr. Arina Juarez and is being seen for Cough; Shortness of Breath; and Alcohol Intoxication   . The patient's initial evaluation and plan have been discussed with the prior provider who initially evaluated the patient. EMERGENCY DEPARTMENT COURSE / MEDICAL DECISION MAKING:       MEDICATIONS GIVEN:  Orders Placed This Encounter   Medications    0.9 % sodium chloride bolus    iohexol (OMNIPAQUE 350) solution 75 mL     LABS / RADIOLOGY:     Labs Reviewed   CBC WITH AUTO DIFFERENTIAL - Abnormal; Notable for the following components:       Result Value    Lymphocytes 22 (*)     Eosinophils % 7 (*)     Absolute Eos # 0.49 (*)     All other components within normal limits   BASIC METABOLIC PANEL W/ REFLEX TO MG FOR LOW K - Abnormal; Notable for the following components:    CREATININE 0.55 (*)     All other components within normal limits   HEPATIC FUNCTION PANEL - Abnormal; Notable for the following components:    AST 51 (*)     All other components within normal limits   LIPASE - Abnormal; Notable for the following components:    Lipase 70 (*)     All other components within normal limits   TOX SCR, BLD, ED - Abnormal; Notable for the following components:    Ethanol 283 (*)     Ethanol percent 4.630 (*)     Salicylate Lvl <1 (*)     Acetaminophen Level <5 (*)     All other components within normal limits   PROTIME-INR   APTT   URINALYSIS WITH MICROSCOPIC   TROPONIN   TROPONIN     CT Chest Pulmonary Embolism W Contrast   Final Result   Exam limited by respiratory motion artifact. No evidence for central   pulmonary embolism. No evidence for acute airspace disease. Hepatic steatosis. RECENT VITALS:     Temp: 99.1 °F (37.3 °C),  Pulse: 91, Resp: 21, BP: (!) 143/88, SpO2: 93 %    This patient is a 44 y.o.  Male with history of alcoholism and numerous psychiatric complaints. The patient arrived intoxicated complaining of hematemesis and hematochezia. He is guaiac negative. A CT chest angiogram was completed and found to be negative. The patient will have a repeat troponin and should be reevaluated at 3 AM once sober. OUTSTANDING TASKS / RECOMMENDATIONS:      1. Await 3 AM sober time     FINAL IMPRESSION:     1. Alcohol abuse    2. Suicidal ideation    3. Shortness of breath      DISPOSITION:       DISPOSITION:  [x]  Discharge   []  Transfer -    []  Admission -     []  Against Medical Advice   []  Eloped   FOLLOW-UP: No follow-up provider specified.    DISCHARGE MEDICATIONS: New Prescriptions    No medications on file          Michoacano Ellis MD  Emergency Medicine Resident  [place] [address] Evonne Blanco MD  05/27/20 0029

## 2020-05-28 ENCOUNTER — CARE COORDINATION (OUTPATIENT)
Dept: CARE COORDINATION | Age: 50
End: 2020-05-28

## 2020-06-04 LAB
EKG ATRIAL RATE: 96 BPM
EKG P AXIS: 9 DEGREES
EKG P-R INTERVAL: 132 MS
EKG Q-T INTERVAL: 342 MS
EKG QRS DURATION: 84 MS
EKG QTC CALCULATION (BAZETT): 432 MS
EKG R AXIS: 11 DEGREES
EKG T AXIS: 47 DEGREES
EKG VENTRICULAR RATE: 96 BPM

## 2020-06-06 ENCOUNTER — HOSPITAL ENCOUNTER (OUTPATIENT)
Age: 50
Setting detail: OBSERVATION
Discharge: PSYCHIATRIC OR SUBSTANCE USE FACILITY | End: 2020-06-08
Attending: EMERGENCY MEDICINE | Admitting: EMERGENCY MEDICINE
Payer: COMMERCIAL

## 2020-06-06 ENCOUNTER — APPOINTMENT (OUTPATIENT)
Dept: GENERAL RADIOLOGY | Age: 50
End: 2020-06-06
Payer: COMMERCIAL

## 2020-06-06 LAB
ABSOLUTE EOS #: 0.3 K/UL (ref 0–0.4)
ABSOLUTE IMMATURE GRANULOCYTE: 0 K/UL (ref 0–0.3)
ABSOLUTE LYMPH #: 1.59 K/UL (ref 1–4.8)
ABSOLUTE MONO #: 1.48 K/UL (ref 0.1–0.8)
BASOPHILS # BLD: 2 % (ref 0–2)
BASOPHILS ABSOLUTE: 0.12 K/UL (ref 0–0.2)
DIFFERENTIAL TYPE: ABNORMAL
EOSINOPHILS RELATIVE PERCENT: 5 % (ref 1–4)
HCT VFR BLD CALC: 40.8 % (ref 40.7–50.3)
HEMOGLOBIN: 13.6 G/DL (ref 13–17)
IMMATURE GRANULOCYTES: 0 %
LYMPHOCYTES # BLD: 27 % (ref 24–44)
MAGNESIUM: 1.7 MG/DL (ref 1.6–2.6)
MCH RBC QN AUTO: 32.9 PG (ref 25.2–33.5)
MCHC RBC AUTO-ENTMCNC: 33.3 G/DL (ref 28.4–34.8)
MCV RBC AUTO: 98.6 FL (ref 82.6–102.9)
MONOCYTES # BLD: 25 % (ref 1–7)
MORPHOLOGY: NORMAL
NRBC AUTOMATED: 0 PER 100 WBC
PDW BLD-RTO: 13.5 % (ref 11.8–14.4)
PLATELET # BLD: ABNORMAL K/UL (ref 138–453)
PLATELET ESTIMATE: ABNORMAL
PLATELET, FLUORESCENCE: 125 K/UL (ref 138–453)
PLATELET, IMMATURE FRACTION: 6.6 % (ref 1.1–10.3)
PMV BLD AUTO: ABNORMAL FL (ref 8.1–13.5)
RBC # BLD: 4.14 M/UL (ref 4.21–5.77)
RBC # BLD: ABNORMAL 10*6/UL
SEG NEUTROPHILS: 41 % (ref 36–66)
SEGMENTED NEUTROPHILS ABSOLUTE COUNT: 2.41 K/UL (ref 1.8–7.7)
TROPONIN INTERP: NORMAL
TROPONIN T: NORMAL NG/ML
TROPONIN, HIGH SENSITIVITY: 12 NG/L (ref 0–22)
TSH SERPL DL<=0.05 MIU/L-ACNC: 1.14 MIU/L (ref 0.3–5)
WBC # BLD: 5.9 K/UL (ref 3.5–11.3)
WBC # BLD: ABNORMAL 10*3/UL

## 2020-06-06 PROCEDURE — 73610 X-RAY EXAM OF ANKLE: CPT

## 2020-06-06 PROCEDURE — 84443 ASSAY THYROID STIM HORMONE: CPT

## 2020-06-06 PROCEDURE — 84484 ASSAY OF TROPONIN QUANT: CPT

## 2020-06-06 PROCEDURE — G0480 DRUG TEST DEF 1-7 CLASSES: HCPCS

## 2020-06-06 PROCEDURE — 93005 ELECTROCARDIOGRAM TRACING: CPT | Performed by: STUDENT IN AN ORGANIZED HEALTH CARE EDUCATION/TRAINING PROGRAM

## 2020-06-06 PROCEDURE — 85055 RETICULATED PLATELET ASSAY: CPT

## 2020-06-06 PROCEDURE — 83735 ASSAY OF MAGNESIUM: CPT

## 2020-06-06 PROCEDURE — 80048 BASIC METABOLIC PNL TOTAL CA: CPT

## 2020-06-06 PROCEDURE — 80307 DRUG TEST PRSMV CHEM ANLYZR: CPT

## 2020-06-06 PROCEDURE — 71045 X-RAY EXAM CHEST 1 VIEW: CPT

## 2020-06-06 PROCEDURE — 85025 COMPLETE CBC W/AUTO DIFF WBC: CPT

## 2020-06-06 PROCEDURE — 99285 EMERGENCY DEPT VISIT HI MDM: CPT

## 2020-06-07 PROBLEM — R07.9 CHEST PAIN: Status: ACTIVE | Noted: 2020-06-07

## 2020-06-07 LAB
TROPONIN INTERP: NORMAL
TROPONIN T: NORMAL NG/ML
TROPONIN, HIGH SENSITIVITY: 10 NG/L (ref 0–22)

## 2020-06-07 PROCEDURE — G0378 HOSPITAL OBSERVATION PER HR: HCPCS

## 2020-06-07 PROCEDURE — 2580000003 HC RX 258: Performed by: STUDENT IN AN ORGANIZED HEALTH CARE EDUCATION/TRAINING PROGRAM

## 2020-06-07 PROCEDURE — 6370000000 HC RX 637 (ALT 250 FOR IP): Performed by: STUDENT IN AN ORGANIZED HEALTH CARE EDUCATION/TRAINING PROGRAM

## 2020-06-07 PROCEDURE — 84484 ASSAY OF TROPONIN QUANT: CPT

## 2020-06-07 PROCEDURE — 93005 ELECTROCARDIOGRAM TRACING: CPT | Performed by: STUDENT IN AN ORGANIZED HEALTH CARE EDUCATION/TRAINING PROGRAM

## 2020-06-07 PROCEDURE — 90792 PSYCH DIAG EVAL W/MED SRVCS: CPT | Performed by: NURSE PRACTITIONER

## 2020-06-07 RX ORDER — SODIUM CHLORIDE 0.9 % (FLUSH) 0.9 %
10 SYRINGE (ML) INJECTION PRN
Status: DISCONTINUED | OUTPATIENT
Start: 2020-06-07 | End: 2020-06-08 | Stop reason: HOSPADM

## 2020-06-07 RX ORDER — DIVALPROEX SODIUM 500 MG/1
500 TABLET, DELAYED RELEASE ORAL EVERY 12 HOURS SCHEDULED
Status: DISCONTINUED | OUTPATIENT
Start: 2020-06-07 | End: 2020-06-07

## 2020-06-07 RX ORDER — FOLIC ACID 1 MG/1
1 TABLET ORAL DAILY
Status: DISCONTINUED | OUTPATIENT
Start: 2020-06-07 | End: 2020-06-08 | Stop reason: HOSPADM

## 2020-06-07 RX ORDER — ALBUTEROL SULFATE 90 UG/1
2 AEROSOL, METERED RESPIRATORY (INHALATION) EVERY 6 HOURS PRN
Status: DISCONTINUED | OUTPATIENT
Start: 2020-06-07 | End: 2020-06-08 | Stop reason: HOSPADM

## 2020-06-07 RX ORDER — METOPROLOL SUCCINATE 25 MG/1
25 TABLET, EXTENDED RELEASE ORAL DAILY
Status: DISCONTINUED | OUTPATIENT
Start: 2020-06-07 | End: 2020-06-08 | Stop reason: HOSPADM

## 2020-06-07 RX ORDER — SODIUM CHLORIDE 0.9 % (FLUSH) 0.9 %
10 SYRINGE (ML) INJECTION EVERY 12 HOURS SCHEDULED
Status: DISCONTINUED | OUTPATIENT
Start: 2020-06-07 | End: 2020-06-08 | Stop reason: HOSPADM

## 2020-06-07 RX ORDER — HYDROXYZINE HYDROCHLORIDE 10 MG/1
25 TABLET, FILM COATED ORAL 3 TIMES DAILY PRN
Status: DISCONTINUED | OUTPATIENT
Start: 2020-06-07 | End: 2020-06-08 | Stop reason: HOSPADM

## 2020-06-07 RX ORDER — PANTOPRAZOLE SODIUM 40 MG/1
40 TABLET, DELAYED RELEASE ORAL DAILY
Status: DISCONTINUED | OUTPATIENT
Start: 2020-06-07 | End: 2020-06-08 | Stop reason: HOSPADM

## 2020-06-07 RX ORDER — TRAZODONE HYDROCHLORIDE 100 MG/1
100 TABLET ORAL NIGHTLY PRN
Status: DISCONTINUED | OUTPATIENT
Start: 2020-06-07 | End: 2020-06-08 | Stop reason: HOSPADM

## 2020-06-07 RX ORDER — DULOXETIN HYDROCHLORIDE 30 MG/1
30 CAPSULE, DELAYED RELEASE ORAL DAILY
Status: ON HOLD | COMMUNITY
End: 2020-06-15 | Stop reason: HOSPADM

## 2020-06-07 RX ORDER — ACETAMINOPHEN 325 MG/1
650 TABLET ORAL EVERY 4 HOURS PRN
Status: DISCONTINUED | OUTPATIENT
Start: 2020-06-07 | End: 2020-06-08 | Stop reason: HOSPADM

## 2020-06-07 RX ORDER — THIAMINE MONONITRATE (VIT B1) 100 MG
100 TABLET ORAL DAILY
Status: DISCONTINUED | OUTPATIENT
Start: 2020-06-07 | End: 2020-06-08 | Stop reason: HOSPADM

## 2020-06-07 RX ORDER — DULOXETIN HYDROCHLORIDE 30 MG/1
30 CAPSULE, DELAYED RELEASE ORAL DAILY
Status: DISCONTINUED | OUTPATIENT
Start: 2020-06-07 | End: 2020-06-08 | Stop reason: HOSPADM

## 2020-06-07 RX ORDER — IPRATROPIUM BROMIDE 21 UG/1
2 SPRAY, METERED NASAL 2 TIMES DAILY
Status: DISCONTINUED | OUTPATIENT
Start: 2020-06-07 | End: 2020-06-08 | Stop reason: HOSPADM

## 2020-06-07 RX ADMIN — PANTOPRAZOLE SODIUM 40 MG: 40 TABLET, DELAYED RELEASE ORAL at 11:26

## 2020-06-07 RX ADMIN — METOPROLOL SUCCINATE 25 MG: 25 TABLET, FILM COATED, EXTENDED RELEASE ORAL at 11:27

## 2020-06-07 RX ADMIN — Medication 100 MG: at 11:26

## 2020-06-07 RX ADMIN — IPRATROPIUM BROMIDE 2 SPRAY: 21 SPRAY NASAL at 21:41

## 2020-06-07 RX ADMIN — DIVALPROEX SODIUM 750 MG: 500 TABLET, DELAYED RELEASE ORAL at 21:41

## 2020-06-07 RX ADMIN — DULOXETINE HYDROCHLORIDE 30 MG: 30 CAPSULE, DELAYED RELEASE ORAL at 17:13

## 2020-06-07 RX ADMIN — Medication 10 ML: at 21:41

## 2020-06-07 RX ADMIN — FOLIC ACID 1 MG: 1 TABLET ORAL at 11:27

## 2020-06-07 RX ADMIN — Medication 10 ML: at 11:27

## 2020-06-07 ASSESSMENT — ENCOUNTER SYMPTOMS
EYE DISCHARGE: 0
SHORTNESS OF BREATH: 0
EYE REDNESS: 0
COLOR CHANGE: 0
ABDOMINAL PAIN: 0

## 2020-06-07 ASSESSMENT — PAIN SCALES - GENERAL: PAINLEVEL_OUTOF10: 7

## 2020-06-07 NOTE — ED PROVIDER NOTES
[facility] [address] [facility]  Emergency Department  Emergency Medicine Resident Sign-out     Care of Leny Becker was assumed from Dr. Denisse Borges and is being seen for Suicidal (Pt has a plan and also is seeing things that are not there. )  . The patient's initial evaluation and plan have been discussed with the prior provider who initially evaluated the patient.      EMERGENCY DEPARTMENT COURSE / MEDICAL DECISION MAKING:       MEDICATIONS GIVEN:  Orders Placed This Encounter   Medications    pantoprazole (PROTONIX) tablet 40 mg    metoprolol succinate (TOPROL XL) extended release tablet 25 mg    hydrOXYzine (ATARAX) tablet 25 mg    divalproex (DEPAKOTE) DR tablet 268 mg    folic acid (FOLVITE) tablet 1 mg    sertraline (ZOLOFT) tablet 150 mg    vitamin B-1 (THIAMINE) tablet 100 mg    albuterol sulfate  (90 Base) MCG/ACT inhaler 2 puff    traZODone (DESYREL) tablet 100 mg    ipratropium (ATROVENT) 0.03 % nasal spray 2 spray    sodium chloride flush 0.9 % injection 10 mL    sodium chloride flush 0.9 % injection 10 mL    acetaminophen (TYLENOL) tablet 650 mg    enoxaparin (LOVENOX) injection 40 mg       LABS / RADIOLOGY:     Labs Reviewed   BASIC METABOLIC PANEL - Abnormal; Notable for the following components:       Result Value    Glucose 62 (*)     CREATININE 0.60 (*)     Chloride 97 (*)     All other components within normal limits   CBC WITH AUTO DIFFERENTIAL - Abnormal; Notable for the following components:    RBC 4.14 (*)     Monocytes 25 (*)     Eosinophils % 5 (*)     Absolute Mono # 1.48 (*)     All other components within normal limits   TOX SCR, BLD, ED - Abnormal; Notable for the following components:    Acetaminophen Level <5 (*)     Ethanol 14 (*)     Ethanol percent 8.045 (*)     Salicylate Lvl <1 (*)     Toxic Tricyclic Sc,Blood POSITIVE (*)     All other components within normal limits   IMMATURE PLATELET FRACTION - Abnormal; Notable for the following components:    Platelet, Fluorescence 125 (*)     All other components within normal limits   MAGNESIUM   TROPONIN   TROPONIN   TSH WITH REFLEX       XR CHEST PORTABLE   Final Result   No acute cardiopulmonary process. XR ANKLE RIGHT (MIN 3 VIEWS)   Final Result   No acute abnormality. RECENT VITALS:     Temp: 99.5 °F (37.5 °C),  Pulse: 83, Resp: 16, BP: 126/73, SpO2: 98 %    This patient is a 44 y.o. Male with complaints of suicidal ideations and chest pain. Patient states he had plans to walk into traffic to harm himself. Patient is a daily drinker. Patient admitted to the observation unit for cardiac rule out and likely psych eval.  Patient signed out awaiting transfer to the floor. OUTSTANDING TASKS / RECOMMENDATIONS:      1. Await Bed placement     FINAL IMPRESSION:     1. Chest pain, unspecified type    2. Depression with suicidal ideation        DISPOSITION:       DISPOSITION:  []  Discharge   []  Transfer -    [x]  Admission - Observation    []  Against Medical Advice   []  Eloped   FOLLOW-UP: Rachelle Cerna MD  [id number]   [place] [address]  [phone]           DISCHARGE MEDICATIONS: New Prescriptions    No medications on file          Giuliano Miranda DO  Emergency Medicine Resident  [facility]       Giuliano Miranda, [address] [facility]  Resident  06/07/20 3842

## 2020-06-07 NOTE — ED NOTES
Patient resting comfortably on stretcher, in no apparent distress  Respirations even and non-labored  Patient has no needs at this time  Remains in sight of 1:1 jaydon Jay RN  06/07/20 8544

## 2020-06-07 NOTE — ED NOTES
Patient resting comfortably on stretcher, in no apparent distress  Respirations even and non-labored  Patient has no needs at this time  Remains in sight of 1:1 guard     Lianna Lloyd RN  06/07/20 3764

## 2020-06-07 NOTE — ED PROVIDER NOTES
file     Inability: Not on file    Transportation needs     Medical: Not on file     Non-medical: Not on file   Tobacco Use    Smoking status: Current Every Day Smoker     Packs/day: 2.00    Smokeless tobacco: Never Used   Substance and Sexual Activity    Alcohol use: Not Currently     Comment: 12 beers daily    Drug use: Not Currently     Types: Marijuana    Sexual activity: Not on file   Lifestyle    Physical activity     Days per week: Not on file     Minutes per session: Not on file    Stress: Not on file   Relationships    Social connections     Talks on phone: Not on file     Gets together: Not on file     Attends Yazdanism service: Not on file     Active member of club or organization: Not on file     Attends meetings of clubs or organizations: Not on file     Relationship status: Not on file    Intimate partner violence     Fear of current or ex partner: Not on file     Emotionally abused: Not on file     Physically abused: Not on file     Forced sexual activity: Not on file   Other Topics Concern    Not on file   Social History Narrative    Not on file       Family History   Problem Relation Age of Onset    High Blood Pressure Mother     Diabetes Mother        Allergies:  Patient has no known allergies. Home Medications:  Prior to Admission medications    Medication Sig Start Date End Date Taking?  Authorizing Provider   ipratropium (ATROVENT) 0.03 % nasal spray 2 sprays by Nasal route 2 times daily 5/4/20   Gustavo Juarez MD   loratadine (CLARITIN) 10 MG tablet Take 1 tablet by mouth daily 5/4/20   Gustavo Juarez MD   meloxicam (MOBIC) 15 MG tablet Take 1 tablet by mouth daily as needed for Pain 5/4/20   Gustavo Juarez MD   sertraline (ZOLOFT) 50 MG tablet Take 3 tablets by mouth daily for 14 days 12/13/19 12/27/19  Nallely Trimble MD   vitamin B-1 (THIAMINE) 100 MG tablet Take 1 tablet by mouth daily  Patient not taking: Reported on 2/19/2020 12/13/19   Nallely Trimble MD   albuterol Allergic/Immunologic: Negative for environmental allergies. Neurological: Negative for headaches. Psychiatric/Behavioral: Positive for suicidal ideas. Negative for agitation and confusion. PHYSICAL EXAM   (up to 7 for level 4, 8 or more for level 5)     INITIAL VITALS:    oral temperature is 99.5 °F (37.5 °C). His blood pressure is 173/82 (abnormal) and his pulse is 97. His respiration is 18 and oxygen saturation is 99%. Physical Exam  Vitals signs and nursing note reviewed. Constitutional:       Appearance: He is well-developed. HENT:      Head: Normocephalic and atraumatic. Nose: Nose normal.      Mouth/Throat:      Mouth: Mucous membranes are moist.   Eyes:      General: No scleral icterus. Conjunctiva/sclera: Conjunctivae normal.      Pupils: Pupils are equal, round, and reactive to light. Neck:      Musculoskeletal: Neck supple. Trachea: No tracheal deviation. Cardiovascular:      Rate and Rhythm: Normal rate and regular rhythm. Heart sounds: Normal heart sounds. No murmur. No friction rub. No gallop. Pulmonary:      Effort: Pulmonary effort is normal. No respiratory distress. Breath sounds: Normal breath sounds. No wheezing or rales. Abdominal:      General: Bowel sounds are normal. There is no distension. Palpations: Abdomen is soft. There is no mass. Tenderness: There is no abdominal tenderness. There is no guarding or rebound. Musculoskeletal: Normal range of motion. Skin:     General: Skin is warm and dry. Findings: No erythema or rash. Neurological:      General: No focal deficit present. Mental Status: He is alert and oriented to person, place, and time.    Psychiatric:      Comments: Depressed mood, poor insight and judgment         DIFFERENTIAL  DIAGNOSIS     PLAN (LABS / IMAGING / EKG):  Orders Placed This Encounter   Procedures    XR CHEST PORTABLE    XR ANKLE RIGHT (MIN 3 VIEWS)    Basic Metabolic Panel    CBC Auto Differential    Magnesium    Troponin    TSH with Reflex    TOX SCR, BLD, ED    Immature Platelet Fraction    EKG 12 Lead    EKG 12 Lead    PATIENT STATUS (FROM ED OR OR/PROCEDURAL) Observation       MEDICATIONS ORDERED:  No orders of the defined types were placed in this encounter. DDX: Suicidal versus ACS versus pneumothorax versus malingering    Initial MDM/Plan: 44 y.o. male who presents with chest pain and suicidal ideation. With chest pain work-up. Patient with elevated heart score at high risk for ACS. No recent cardiac work-up as her stress test.  We will plan admission observation unit for cardiac consultation as well as psychiatric clearance. DIAGNOSTIC RESULTS / EMERGENCY DEPARTMENT COURSE / MDM     LABS:  Labs Reviewed   BASIC METABOLIC PANEL - Abnormal; Notable for the following components:       Result Value    Glucose 62 (*)     CREATININE 0.60 (*)     Chloride 97 (*)     All other components within normal limits   CBC WITH AUTO DIFFERENTIAL - Abnormal; Notable for the following components:    RBC 4.14 (*)     Monocytes 25 (*)     Eosinophils % 5 (*)     Absolute Mono # 1.48 (*)     All other components within normal limits   TOX SCR, BLD, ED - Abnormal; Notable for the following components:    Acetaminophen Level <5 (*)     Ethanol 14 (*)     Ethanol percent 9.954 (*)     Salicylate Lvl <1 (*)     Toxic Tricyclic Sc,Blood POSITIVE (*)     All other components within normal limits   IMMATURE PLATELET FRACTION - Abnormal; Notable for the following components:    Platelet, Fluorescence 125 (*)     All other components within normal limits   MAGNESIUM   TROPONIN   TROPONIN   TSH WITH REFLEX         RADIOLOGY:  Xr Ankle Right (min 3 Views)    Result Date: 6/6/2020  EXAMINATION: THREE XRAY VIEWS OF THE RIGHT ANKLE 6/6/2020 11:04 pm COMPARISON: None.  HISTORY: ORDERING SYSTEM PROVIDED HISTORY: Pain TECHNOLOGIST PROVIDED HISTORY: Pain Reason for Exam: c/o pain hx: hardware Acuity: Acute reciprocal depression. Initial troponin 12. Will recheck. [MS]   Kayla Jun 07, 2020   0100 Stress test chest pain work-up negative troponins negative    [RB]      ED Course User Index  [MS] Dahiana Garner DO  [RB] Cherelle Strong DO     Due to concerning EKG will admit to observation unit. Troponins negative. Alcohol level 0.014. Patient well-appearing  with no signs of alcohol withdrawal.  Patient be admitted to the observation unit. Admission orders placed. Signout given to Dr. Gisella Gonzalez:  None    CONSULTS:  IP CONSULT TO CARDIOLOGY  IP CONSULT TO PSYCHIATRY    CRITICAL CARE:  Please see attending note    FINAL IMPRESSION      1. Chest pain, unspecified type    2. Depression with suicidal ideation          DISPOSITION / PLAN     DISPOSITION Admitted 06/07/2020 12:29:27 AM        PATIENTREFERRED TO:  Margaret Najjar, MD  [address]   [address]  [address]  [phone]            DISCHARGE MEDICATIONS:  New Prescriptions    No medications on file       Dahiana Garner DO  EmergencyMedicine Resident    (Please note that portions of this note were completed with a voice recognition program.  Efforts were made to edit the dictations but occasionally words are mis-transcribed.)       Dahiana Garner DO  Resident  06/07/20 0102

## 2020-06-07 NOTE — H&P
[facility]  CDU / OBSERVATION eNCOUnter  Resident Note     Pt Name: Savanna Lopez  MRN: [id number]  [place] 1970  Date of evaluation: 6/7/20  Patient's PCP is : Alisha Wright MD    CHIEF COMPLAINT       Chief Complaint   Patient presents with    Suicidal     Pt has a plan and also is seeing things that are not there. HISTORY OF PRESENT ILLNESS    Savanna Lopez is a 44 y.o. male with history of epilepsy, EtOH abuse, anxiety, COPD, depression who presents with acute onset left-sided chest pain radiating to the left arm. Worse with exertion. Also thoughts of harming himself by walking into traffic however does not have these believes at this time. Admitted to observation for cardiology evaluation, stress test, tele-psych evaluation. Location/Symptom: Left-sided chest pain  Timing/Onset: Acute  Provocation: Exertion  Quality: Pressure  Radiation: Left arm  Severity: Moderate to severe  Timing/Duration: 1 day  Modifying Factors: Worse with exertion    REVIEW OF SYSTEMS       General ROS - No fevers, No malaise. Intermittent thoughts of suicidal ideation  Ophthalmic ROS - No discharge, No changes in vision  ENT ROS -  No sore throat, No rhinorrhea,   Respiratory ROS - no shortness of breath, no cough, no  wheezing  Cardiovascular ROS - chest pain, no dyspnea on exertion  Gastrointestinal ROS - No abdominal pain, no nausea or vomiting, no change in bowel habits, no black or bloody stools  Genito-Urinary ROS - No dysuria, trouble voiding, or hematuria  Musculoskeletal ROS - No myalgias, No arthalgias  Neurological ROS - No headache, no dizziness/lightheadedness, No focal weakness, no loss of sensation  Dermatological ROS - No lesions, No rash     (PQRS) Advance directives on face sheet per hospital policy.  No change unless specifically mentioned in chart    PAST MEDICAL HISTORY    has a past medical history of Alcohol abuse, Anxiety, COPD (chronic obstructive pulmonary [address], [place]        RADIOLOGY:   I directly visualized the following  images and reviewed the radiologist interpretations:    Xr Ankle Right (min 3 Views)    Result Date: 6/6/2020  EXAMINATION: THREE XRAY VIEWS OF THE RIGHT ANKLE 6/6/2020 11:04 pm COMPARISON: None. HISTORY: ORDERING SYSTEM PROVIDED HISTORY: Pain TECHNOLOGIST PROVIDED HISTORY: Pain Reason for Exam: c/o pain hx: hardware Acuity: Acute Type of Exam: Initial FINDINGS: Postsurgical changes are seen to the right ankle. Internal fixation plate is placed over the distal fibula. 2 screws are placed through the medial ankle malleolus there is no evidence for loosening of the hardware or complication there is no fracture. No acute abnormality. Xr Chest Portable    Result Date: 6/6/2020  EXAMINATION: ONE XRAY VIEW OF THE CHEST 6/6/2020 11:04 pm COMPARISON: Chest radiograph 10/10/2019. HISTORY: ORDERING SYSTEM PROVIDED HISTORY: CP TECHNOLOGIST PROVIDED HISTORY: CP Reason for Exam: portable upright/ c/o chest pain and SOB Acuity: Acute Type of Exam: Initial FINDINGS: There is no acute consolidation or effusion. There is no pneumothorax. The mediastinal structures are unremarkable. The upper abdomen is unremarkable. The extrathoracic soft tissues are unremarkable. There is no acute osseous abnormality. No acute cardiopulmonary process. LABS:  I have reviewed and interpreted all available lab results.   Labs Reviewed   BASIC METABOLIC PANEL - Abnormal; Notable for the following components:       Result Value    Glucose 62 (*)     CREATININE 0.60 (*)     Chloride 97 (*)     All other components within normal limits   CBC WITH AUTO DIFFERENTIAL - Abnormal; Notable for the following components:    RBC 4.14 (*)     Monocytes 25 (*)     Eosinophils % 5 (*)     Absolute Mono # 1.48 (*)     All other components within normal limits   TOX SCR, BLD, ED - Abnormal; Notable for the following components:    Acetaminophen Level <5 (*)

## 2020-06-07 NOTE — ED NOTES
Patient resting comfortably on stretcher, in no apparent distress  Respirations even and non-labored  Patient has no needs at this time  Remains in sight of 1:1 jaydon Valdes RN  06/07/20 2755

## 2020-06-07 NOTE — CONSULTS
crackles. Cardiovascular:  · Normal S1 and S2.   · Jugular venous pulsation Normal  Abdomen:   · Soft  · No tenderness  Extremities:  · No lower extremity edema  Neurologic:  · Alert and oriented. · Moves all extremities well      DATA:    Diagnostics:    Labs:     CBC:   Recent Labs     06/06/20 2245   WBC 5.9   HGB 13.6   HCT 40.8   PLT See Reflexed IPF Result     BMP:   Recent Labs     06/06/20 2245      K 4.1   CO2 24   BUN 9   CREATININE 0.60*   LABGLOM >60   GLUCOSE 62*     BNP: No results for input(s): BNP in the last 72 hours. PT/INR: No results for input(s): PROTIME, INR in the last 72 hours. APTT:No results for input(s): APTT in the last 72 hours. CARDIAC ENZYMES:  Recent Labs     06/06/20 2245 06/07/20  0036   TROPHS 12 10     No results for input(s): CKTOTAL, CKMB, CKMBINDEX, TROPONINI in the last 72 hours. Invalid input(s):  [address]   Recent Labs     06/06/20 2245 06/07/20  0036   TROPONINT NOT REPORTED NOT REPORTED     FASTING LIPID PANEL:  Lab Results   Component Value Date    HDL 56 12/04/2019    TRIG 56 10/11/2019     LIVER PROFILE:No results for input(s): AST, ALT, LABALBU in the last 72 hours. EKG: as above    ECHO:9/2019     Summary  Left ventricle is normal in size, global left ventricular systolic function  is normal, estimated ejection fraction is 60%. Trivial mitral regurgitation. Trivial tricuspid regurgitation. STRESS:    CATH:        IMPRESSION:    1. Chest pain - atypical features, troponin 12>10, EKG with J point elevation, ACS ruled out, currently asymptomatic, will obtain lexiscan stress (cannot run because of ankle fractures)  2. HTN - uncontrolled, resume BB, if needed can added norvasc  3. Hx of NSVT - continue BB  4. Hx of GIB - no recent bleeding  5. Tobacco abuse - cessation counselling  6. Etoh abuse  7. Epilepsy      Thank you for allowing us to participate in [address] [place] [address]. Will follow with you.       Electronically signed on 06/07/20 at

## 2020-06-07 NOTE — CARE COORDINATION
Case Management Initial Discharge Plan  Myron Jenkins,             Met with:patient to discuss discharge plans. Information verified: address, contacts, phone number, , insurance Yes  Emergency Contact/Next of Kin name & number:   PCP: Nafisa Harris MD  Date of last visit: 1 month ago     Insurance Provider: Leonc[employer]     Discharge Planning    Living Arrangements:  Other (Comment)   Support Systems:  Family Members    Homeless, was staying at [facility] but states that he is no longer able to stay there unless he goes to a 30 day detox first. Patient states that he has no interest in detox at this time. Patient able to perform ADL's:Independent    Current Services (outpatient & in home) none   DME equipment: na  DME provider: na    Receiving oral anticoagulation therapy? No    If indicated:   Physician managing anticoagulation treatment:   Where does patient obtain lab work for ATC treatment? Potential Assistance Needed:  Transportation    Patient agreeable to home care: No  [place] of choice provided:  n/a    Prior SNF/Rehab Placement and Facility: yes  Agreeable to SNF/Rehab: No  [place] of choice provided: n/a   Evaluation: yes    Expected Discharge date:       Patient expects to be discharged to: Shelter vs sisters   Follow Up Appointment: Best Day/ Time:      Transportation provider: Medical cab   Transportation arrangements needed for discharge: Yes    Readmission Risk              Risk of Unplanned Readmission:        0             Does patient have a readmission risk score greater than 14?: EMERSON   If yes, follow-up appointment must be made within 7 days of discharge.      Goals of Care: \"I don't know\"      Discharge Plan: Shelter vs BHI           Electronically signed by Aminta Lee RN on 20 at 9:18 AM EDT

## 2020-06-07 NOTE — PROGRESS NOTES
[facility]  CDU / OBSERVATION eNCOUnter  Attending NOte       I performed a history and physical examination of the patient and discussed management with the resident. I reviewed the residents note and agree with the documented findings and plan of care. Any areas of disagreement are noted on the chart. I was personally present for the key portions of any procedures. I have documented in the chart those procedures where I was not present during the key portions. I have reviewed the nurses notes. I agree with the chief complaint, past medical history, past surgical history, allergies, medications, social and family history as documented unless otherwise noted below. The Family history, social history, and ROS are effectively unchanged since admission unless noted elsewhere in the chart. Patient is with complaints of chest pain. Pain does not sound particularly cardiac in nature given his wandering nature of pain and specific pain description. It is been constant, occurring also with rest.  Patient has suicidal ideation. Patient seen by cardiology here. Due to patient's risk factors patient will have stress testing performed. Patient will have psychiatric referral and currently is under watch. Patient will require stress testing for medical clearance and then we can proceed with psychiatric treatment.     Benja Muhammad MD  Attending Emergency  Physician

## 2020-06-07 NOTE — VIRTUAL HEALTH
Consults  Patient Location:  [address] Med Surg    Provider Location (City/State): [place]    This virtual visit was conducted via interactive/real-time audio/video. Department of Psychiatry  Consult Service  Nurse Practitioner Psychiatric Assessment      Thank you very much for allowing us to participate in the care of this patient. Reason for Consult:  Suicidal Ideations      History obtained from:  patient, electronic medical record    HISTORY OF PRESENT ILLNESS:          The patient is a 44 y.o. male with significant past medical history of Bipolar I Disorder with Depression, Alcoholism, Wernicke-Korsakoff Psychosis,  HTN, Seizure Disorder and NSVT who is admitted medically for treatment of  with chest pain, depression, suicidal thoughts. Patient states he has been feeling depressed and had some intermittent thoughts about walking into traffic to harm himself. Denies any actual attempts to hurt himself. Patient also endorses some chest pain which occurred while he was walking in the park today. 7 out of 10. Center of the chest radiating to the left arm. Worse with ambulation. Patient has no cardiac history that is aware of however is a daily drinker. States he did drink 6 to 8 cans of beer yesterday but has not drank today. Patient was last admitted to the [facility] on 10/10/2019. Patient is seen via tele psych. He is flat and poorly reactive. He reported that he is \"in between\" places and has been homeless for one week. He was in a rehab center (he has completed two different programs) and he started working in February and relapsed around May 25th. He stated that he does not feel comfortable; he reported that over the last week, he has noticed \"things moving\" and voices that Maria Elena Gomez is done, he has not accomplished anything in life - no purpose to be here. \" He is able to verbalize that things are not really moving.  He reports that the voices are occasional

## 2020-06-08 ENCOUNTER — HOSPITAL ENCOUNTER (INPATIENT)
Age: 50
LOS: 9 days | Discharge: HOME | DRG: 753 | End: 2020-06-17
Attending: PSYCHIATRY & NEUROLOGY | Admitting: PSYCHIATRY & NEUROLOGY
Payer: COMMERCIAL

## 2020-06-08 ENCOUNTER — APPOINTMENT (OUTPATIENT)
Dept: NUCLEAR MEDICINE | Age: 50
End: 2020-06-08
Payer: COMMERCIAL

## 2020-06-08 VITALS
WEIGHT: 204 LBS | HEIGHT: 72 IN | RESPIRATION RATE: 16 BRPM | DIASTOLIC BLOOD PRESSURE: 87 MMHG | BODY MASS INDEX: 27.63 KG/M2 | HEART RATE: 59 BPM | SYSTOLIC BLOOD PRESSURE: 130 MMHG | OXYGEN SATURATION: 97 % | TEMPERATURE: 97.9 F

## 2020-06-08 PROBLEM — F33.9 MAJOR DEPRESSIVE DISORDER, RECURRENT (HCC): Status: ACTIVE | Noted: 2020-06-08

## 2020-06-08 LAB
ACETAMINOPHEN LEVEL: <5 UG/ML (ref 10–30)
ANION GAP SERPL CALCULATED.3IONS-SCNC: 15 MMOL/L (ref 9–17)
BUN BLDV-MCNC: 9 MG/DL (ref 6–20)
BUN/CREAT BLD: ABNORMAL (ref 9–20)
CALCIUM SERPL-MCNC: 9.4 MG/DL (ref 8.6–10.4)
CHLORIDE BLD-SCNC: 97 MMOL/L (ref 98–107)
CO2: 24 MMOL/L (ref 20–31)
CREAT SERPL-MCNC: 0.6 MG/DL (ref 0.7–1.2)
EKG ATRIAL RATE: 81 BPM
EKG ATRIAL RATE: 91 BPM
EKG P AXIS: 63 DEGREES
EKG P AXIS: 83 DEGREES
EKG P-R INTERVAL: 128 MS
EKG P-R INTERVAL: 158 MS
EKG Q-T INTERVAL: 342 MS
EKG Q-T INTERVAL: 376 MS
EKG QRS DURATION: 82 MS
EKG QRS DURATION: 90 MS
EKG QTC CALCULATION (BAZETT): 420 MS
EKG QTC CALCULATION (BAZETT): 436 MS
EKG R AXIS: 23 DEGREES
EKG R AXIS: 34 DEGREES
EKG T AXIS: 53 DEGREES
EKG T AXIS: 62 DEGREES
EKG VENTRICULAR RATE: 81 BPM
EKG VENTRICULAR RATE: 91 BPM
ETHANOL PERCENT: 0.01 %
ETHANOL: 14 MG/DL
GFR AFRICAN AMERICAN: >60 ML/MIN
GFR NON-AFRICAN AMERICAN: >60 ML/MIN
GFR SERPL CREATININE-BSD FRML MDRD: ABNORMAL ML/MIN/{1.73_M2}
GFR SERPL CREATININE-BSD FRML MDRD: ABNORMAL ML/MIN/{1.73_M2}
GLUCOSE BLD-MCNC: 62 MG/DL (ref 70–99)
LV EF: 55 %
LVEF MODALITY: NORMAL
POTASSIUM SERPL-SCNC: 4.1 MMOL/L (ref 3.7–5.3)
SALICYLATE LEVEL: <1 MG/DL (ref 3–10)
SODIUM BLD-SCNC: 136 MMOL/L (ref 135–144)
TOXIC TRICYCLIC SC,BLOOD: NEGATIVE

## 2020-06-08 PROCEDURE — A9500 TC99M SESTAMIBI: HCPCS | Performed by: EMERGENCY MEDICINE

## 2020-06-08 PROCEDURE — 96372 THER/PROPH/DIAG INJ SC/IM: CPT

## 2020-06-08 PROCEDURE — 3430000000 HC RX DIAGNOSTIC RADIOPHARMACEUTICAL: Performed by: EMERGENCY MEDICINE

## 2020-06-08 PROCEDURE — 6370000000 HC RX 637 (ALT 250 FOR IP): Performed by: STUDENT IN AN ORGANIZED HEALTH CARE EDUCATION/TRAINING PROGRAM

## 2020-06-08 PROCEDURE — 6370000000 HC RX 637 (ALT 250 FOR IP): Performed by: NURSE PRACTITIONER

## 2020-06-08 PROCEDURE — G0378 HOSPITAL OBSERVATION PER HR: HCPCS

## 2020-06-08 PROCEDURE — 78452 HT MUSCLE IMAGE SPECT MULT: CPT

## 2020-06-08 PROCEDURE — 6360000002 HC RX W HCPCS: Performed by: STUDENT IN AN ORGANIZED HEALTH CARE EDUCATION/TRAINING PROGRAM

## 2020-06-08 PROCEDURE — 2580000003 HC RX 258: Performed by: EMERGENCY MEDICINE

## 2020-06-08 PROCEDURE — 1240000000 HC EMOTIONAL WELLNESS R&B

## 2020-06-08 PROCEDURE — 93010 ELECTROCARDIOGRAM REPORT: CPT | Performed by: INTERNAL MEDICINE

## 2020-06-08 PROCEDURE — 2580000003 HC RX 258: Performed by: STUDENT IN AN ORGANIZED HEALTH CARE EDUCATION/TRAINING PROGRAM

## 2020-06-08 PROCEDURE — 6360000002 HC RX W HCPCS: Performed by: NURSE PRACTITIONER

## 2020-06-08 PROCEDURE — 93017 CV STRESS TEST TRACING ONLY: CPT

## 2020-06-08 RX ORDER — ATROPINE SULFATE 0.1 MG/ML
0.5 INJECTION INTRAVENOUS EVERY 5 MIN PRN
Status: DISCONTINUED | OUTPATIENT
Start: 2020-06-08 | End: 2020-06-08 | Stop reason: ALTCHOICE

## 2020-06-08 RX ORDER — SODIUM CHLORIDE 0.9 % (FLUSH) 0.9 %
10 SYRINGE (ML) INJECTION PRN
Status: DISCONTINUED | OUTPATIENT
Start: 2020-06-08 | End: 2020-06-08 | Stop reason: HOSPADM

## 2020-06-08 RX ORDER — ALBUTEROL SULFATE 2.5 MG/3ML
2.5 SOLUTION RESPIRATORY (INHALATION) EVERY 6 HOURS PRN
Status: DISCONTINUED | OUTPATIENT
Start: 2020-06-08 | End: 2020-06-08 | Stop reason: ALTCHOICE

## 2020-06-08 RX ORDER — NICOTINE 21 MG/24HR
1 PATCH, TRANSDERMAL 24 HOURS TRANSDERMAL DAILY
Status: DISCONTINUED | OUTPATIENT
Start: 2020-06-09 | End: 2020-06-17 | Stop reason: HOSPADM

## 2020-06-08 RX ORDER — NITROGLYCERIN 0.4 MG/1
0.4 TABLET SUBLINGUAL EVERY 5 MIN PRN
Status: DISCONTINUED | OUTPATIENT
Start: 2020-06-08 | End: 2020-06-08 | Stop reason: ALTCHOICE

## 2020-06-08 RX ORDER — TRAZODONE HYDROCHLORIDE 50 MG/1
50 TABLET ORAL NIGHTLY PRN
Status: DISCONTINUED | OUTPATIENT
Start: 2020-06-09 | End: 2020-06-08

## 2020-06-08 RX ORDER — SODIUM CHLORIDE 9 MG/ML
500 INJECTION, SOLUTION INTRAVENOUS CONTINUOUS PRN
Status: DISCONTINUED | OUTPATIENT
Start: 2020-06-08 | End: 2020-06-08 | Stop reason: ALTCHOICE

## 2020-06-08 RX ORDER — TRAZODONE HYDROCHLORIDE 50 MG/1
50 TABLET ORAL NIGHTLY PRN
Status: DISCONTINUED | OUTPATIENT
Start: 2020-06-08 | End: 2020-06-17 | Stop reason: HOSPADM

## 2020-06-08 RX ORDER — SODIUM CHLORIDE 0.9 % (FLUSH) 0.9 %
10 SYRINGE (ML) INJECTION PRN
Status: DISCONTINUED | OUTPATIENT
Start: 2020-06-08 | End: 2020-06-08 | Stop reason: ALTCHOICE

## 2020-06-08 RX ORDER — METOPROLOL TARTRATE 5 MG/5ML
5 INJECTION INTRAVENOUS EVERY 5 MIN PRN
Status: DISCONTINUED | OUTPATIENT
Start: 2020-06-08 | End: 2020-06-08 | Stop reason: ALTCHOICE

## 2020-06-08 RX ADMIN — REGADENOSON 0.4 MG: 0.08 INJECTION, SOLUTION INTRAVENOUS at 11:41

## 2020-06-08 RX ADMIN — DULOXETINE HYDROCHLORIDE 30 MG: 30 CAPSULE, DELAYED RELEASE ORAL at 08:55

## 2020-06-08 RX ADMIN — SODIUM CHLORIDE 250 ML: 9 INJECTION, SOLUTION INTRAVENOUS at 11:32

## 2020-06-08 RX ADMIN — SODIUM CHLORIDE, PRESERVATIVE FREE 10 ML: 5 INJECTION INTRAVENOUS at 11:43

## 2020-06-08 RX ADMIN — DIVALPROEX SODIUM 750 MG: 500 TABLET, DELAYED RELEASE ORAL at 19:48

## 2020-06-08 RX ADMIN — Medication 100 MG: at 08:55

## 2020-06-08 RX ADMIN — TRAZODONE HYDROCHLORIDE 50 MG: 50 TABLET ORAL at 23:47

## 2020-06-08 RX ADMIN — SODIUM CHLORIDE, PRESERVATIVE FREE 10 ML: 5 INJECTION INTRAVENOUS at 12:52

## 2020-06-08 RX ADMIN — TETRAKIS(2-METHOXYISOBUTYLISOCYANIDE)COPPER(I) TETRAFLUOROBORATE 12.7 MILLICURIE: 1 INJECTION, POWDER, LYOPHILIZED, FOR SOLUTION INTRAVENOUS at 11:43

## 2020-06-08 RX ADMIN — DIVALPROEX SODIUM 750 MG: 500 TABLET, DELAYED RELEASE ORAL at 08:54

## 2020-06-08 RX ADMIN — TETRAKIS(2-METHOXYISOBUTYLISOCYANIDE)COPPER(I) TETRAFLUOROBORATE 47 MILLICURIE: 1 INJECTION, POWDER, LYOPHILIZED, FOR SOLUTION INTRAVENOUS at 12:52

## 2020-06-08 RX ADMIN — FOLIC ACID 1 MG: 1 TABLET ORAL at 08:55

## 2020-06-08 RX ADMIN — PANTOPRAZOLE SODIUM 40 MG: 40 TABLET, DELAYED RELEASE ORAL at 08:55

## 2020-06-08 RX ADMIN — Medication 10 ML: at 08:54

## 2020-06-08 RX ADMIN — IPRATROPIUM BROMIDE 2 SPRAY: 21 SPRAY NASAL at 08:55

## 2020-06-08 RX ADMIN — Medication 10 ML: at 11:17

## 2020-06-08 RX ADMIN — ENOXAPARIN SODIUM 40 MG: 40 INJECTION SUBCUTANEOUS at 08:55

## 2020-06-08 ASSESSMENT — PAIN SCALES - GENERAL: PAINLEVEL_OUTOF10: 4

## 2020-06-08 ASSESSMENT — SLEEP AND FATIGUE QUESTIONNAIRES
DO YOU HAVE DIFFICULTY SLEEPING: NO
DO YOU USE A SLEEP AID: NO
AVERAGE NUMBER OF SLEEP HOURS: 6

## 2020-06-08 ASSESSMENT — LIFESTYLE VARIABLES: HISTORY_ALCOHOL_USE: YES

## 2020-06-08 ASSESSMENT — PATIENT HEALTH QUESTIONNAIRE - PHQ9: SUM OF ALL RESPONSES TO PHQ QUESTIONS 1-9: 22

## 2020-06-08 NOTE — PROGRESS NOTES
Attempted treadmill cardiolite stress test, pt unable to achieve HR goal. Became fatigued. Instructed per MARY Rosen CNP, here in stress lab, to change to Lexiscan stress . Lexiscan given, Pt tolerated well.

## 2020-06-08 NOTE — PROCEDURES
[facility]                  [id number] [place] [address]                              CARDIAC STRESS TEST    PATIENT NAME: Kevin Jones                     :        1970  MED REC NO:   [id number]                             ROOM:       9195  ACCOUNT NO:   [de-identified]                           ADMIT DATE: 2020  PROVIDER:     Jarvis Malcolm    DATE OF STUDY:  2020    ORDERING PROVIDER:  Dylon Youngblood MD  INTERPRETING PHYSICIAN:  Jarvis Malcolm MD    CARDIOLITE TREADMILL STRESS TEST WITH LEXISCAN:    Indication:  Angina    Protocol:  Omega with Braden Castro  Medications:  Lexiscan, 0.4 mg  Resting heart rate:  64  Resting blood pressure:  123/74  Resting EKG:  Abnormal (LVH changes)  Maximum heart rate:  123, or 73% of age predicted maximum  Reason for termination:  Fatigue  Peak blood pressure:  171/73  Stress heart response:  Normal  Stress BP response:  Appropriate  Chest discomfort:  None  Ischemic EKG changes:  None    IMPRESSION:  Electrocardiographically negative treadmill stress study with Lexiscan. Cardiolite results to follow from the department of Nuclear Medicine.         Maryjane Jacob    D: 2020 14:10:09       T: 2020 14:12:25     AK/ADEEL  Job#: [id number]     Doc#: Unknown    CC:    () normal rate, regular rhythm, and no murmur.

## 2020-06-08 NOTE — PROGRESS NOTES
[facility]  Progress Note                   Date:   6/8/2020  Patient name: Savanna Lopez  Date of admission:  6/6/2020  9:32 PM  MRN:   [id number]  YOB: 1970  PCP: Alisha Wright MD    Reason for Admission: Chest pain [R07.9]    Subjective:       Clinical Changes /Abnormalities:  Patient seen and examined sitting up in bed in room with guard in room. Discussed case with RN. SR on tele HR 77  Plans for Lexiscan stress test today d/t Chest pain. Troponins are 12 and 10. Review of Systems    Medications:   Scheduled Meds:   pantoprazole  40 mg Oral Daily    metoprolol succinate  25 mg Oral Daily    folic acid  1 mg Oral Daily    vitamin B-1  100 mg Oral Daily    ipratropium  2 spray Nasal BID    sodium chloride flush  10 mL Intravenous 2 times per day    enoxaparin  40 mg Subcutaneous Daily    divalproex  750 mg Oral 2 times per day    DULoxetine  30 mg Oral Daily     Continuous Infusions:   sodium chloride       CBC:   Recent Labs     06/06/20  2245   WBC 5.9   HGB 13.6   PLT See Reflexed IPF Result     BMP:    Recent Labs     06/06/20  2245      K 4.1   CL 97*   CO2 24   BUN 9   CREATININE 0.60*   GLUCOSE 62*     Hepatic:No results for input(s): AST, ALT, ALB, BILITOT, ALKPHOS in the last 72 hours. Troponin:   Recent Labs     06/06/20  2245 06/07/20  0036   TROPHS 12 10     BNP: No results for input(s): BNP in the last 72 hours. Lipids: No results for input(s): CHOL, HDL in the last 72 hours. Invalid input(s): LDLCALCU  INR: No results for input(s): INR in the last 72 hours. Objective:   Vitals: /87   Pulse 59   Temp 97.9 °F (36.6 °C)   Resp 16   Ht 6' (1.829 m)   Wt 204 lb (92.5 kg)   SpO2 97%   BMI 27.67 kg/m²   General appearance: alert and cooperative with exam  HEENT: Head: Normocephalic, no lesions, without obvious abnormality.   Neck:no JVD, trachea midline, no adenopathy  Lungs: Clear to auscultation  Heart: Regular rate and

## 2020-06-08 NOTE — PROGRESS NOTES
OBS/CDU   RESIDENT NOTE      Patients PCP is: Omaira Newman MD        SUBJECTIVE      No acute events overnight. Had episode of recurrent right-sided chest pain that resolved spontaneously overnight. Has been able to tolerate a full diet without nausea or vomiting. The patient is urinating on his own and is passing flatus. Denies fever, chills, nausea, vomiting, chest pain, shortness of breath, abdominal pain, focal weakness, numbness, tingling, urinary/bowel symptoms, vision changes, visual hallucinations, or headache. Awaiting stress test today. PHYSICAL EXAM      General: NAD, AO X 3  Heent: EMOI, PERRL  Neck: SUPPLE, NO JVD  Cardiovascular: RRR, S1S2  Pulmonary: CTAB, NO SOB  Abdomen: SOFT, NTTP, ND  Extremities: +2/4 PULSES DISTAL, NO SWELLING  Neuro / Psych: NO NUMBNESS OR TINGLING, MENTATION AT BASELINE    PERTINENT TEST /EXAMS      I have reviewed all available laboratory results. MEDICATIONS CURRENT   pantoprazole (PROTONIX) tablet 40 mg, Daily  metoprolol succinate (TOPROL XL) extended release tablet 25 mg, Daily  hydrOXYzine (ATARAX) tablet 25 mg, TID PRN  folic acid (FOLVITE) tablet 1 mg, Daily  vitamin B-1 (THIAMINE) tablet 100 mg, Daily  albuterol sulfate  (90 Base) MCG/ACT inhaler 2 puff, Q6H PRN  traZODone (DESYREL) tablet 100 mg, Nightly PRN  ipratropium (ATROVENT) 0.03 % nasal spray 2 spray, BID  sodium chloride flush 0.9 % injection 10 mL, 2 times per day  sodium chloride flush 0.9 % injection 10 mL, PRN  acetaminophen (TYLENOL) tablet 650 mg, Q4H PRN  enoxaparin (LOVENOX) injection 40 mg, Daily  divalproex (DEPAKOTE) DR tablet 750 mg, 2 times per day  DULoxetine (CYMBALTA) extended release capsule 30 mg, Daily        All medication charted and reviewed. CONSULTS      IP CONSULT TO CARDIOLOGY  IP CONSULT TO PSYCHIATRY    ASSESSMENT/PLAN       Rafia De La Torre is a 44 y.o. male who presents with     1.   Acute onset left-sided chest pain radiating to the left arm of unclear etiology  - Cardiology evaluation with plan for stress test today  - Continue home cardiac medications     2. Acute on chronic suicidal ideation with no current thoughts of self-harm  - Reports considering walking into traffic prior to presenting to emergency department  - These are chronic persistent thoughts the patient has but has not acted on it  - Tele-psych evaluation, requested to be re-consulted prior to patient's discharge  -Resume home psych medications       · Continue home medications and pain control  · Monitor vitals, labs, and imaging  · DISPO: pending consults and clinical improvement    --  Dwayne Ramirez  Emergency Medicine Resident Physician     This dictation was generated by voice recognition computer software. Although all attempts are made to edit the dictation for accuracy, there may be errors in the transcription that are not intended.

## 2020-06-08 NOTE — DISCHARGE SUMMARY
CDU Discharge Summary        Patient:  Pritesh Bean  YOB: 1970    MRN: [id number]   Acct: [de-identified]    Primary Care Physician: Michael Kennedy MD    Admit date:  6/6/2020  9:32 PM  Discharge date: 6/8/2020 10:15 PM    Discharge Diagnoses:     Acute left-sided chest pain due to unclear etiology  Improved with continuation of home medications    Acute on chronic suicidal ideation  Discharged to [facility] for inpatient management    Follow-up:  Call today/tomorrow for a follow up appointment with Michael Kennedy MD , or return to the Emergency Room with worsening symptoms    Stressed to patient the importance of following up with primary care doctor for further workup/management of symptoms. Pt verbalizes understanding and agrees with plan. Discharge Medications:  Changes to medications          Imer Bueno   Home Medication Instructions [id number]    Printed on:06/08/20 3908   Medication Information                      acetaminophen (TYLENOL) 500 MG tablet  Take 1 tablet by mouth 4 times daily as needed for Pain             albuterol sulfate HFA (PROVENTIL HFA) 108 (90 Base) MCG/ACT inhaler  Inhale 2 puffs into the lungs every 6 hours as needed for Wheezing             artificial tears (ARTIFICIAL TEARS) OINT  as needed.              divalproex (DEPAKOTE) 500 MG DR tablet  Take 1 tablet by mouth every 12 hours             DULoxetine (CYMBALTA) 30 MG extended release capsule  Take 30 mg by mouth daily             folic acid (FOLVITE) 1 MG tablet  Take 1 tablet by mouth daily             hydrOXYzine (ATARAX) 25 MG tablet  Take 25 mg by mouth 3 times daily as needed for Itching             ipratropium (ATROVENT) 0.03 % nasal spray  2 sprays by Nasal route 2 times daily             loratadine (CLARITIN) 10 MG tablet  Take 1 tablet by mouth daily             meloxicam (MOBIC) 15 MG tablet  Take 1 tablet by mouth daily as needed for Pain             metoprolol succinate (TOPROL XL) 25 MG degraded by respiratory motion artifact, limiting evaluation of the segmental branches. No evidence for central pulmonary embolism. Main pulmonary artery is normal in caliber. Mediastinum: No evidence of mediastinal lymphadenopathy. The heart and pericardium demonstrate no acute abnormality. There is no acute abnormality of the thoracic aorta. Lungs/pleura: Respiratory motion artifact. No consolidation. No evidence for edema. No effusion. The central airway is patent. Upper Abdomen: Hepatic steatosis. Soft Tissues/Bones: No acute bone or soft tissue abnormality. Exam limited by respiratory motion artifact. No evidence for central pulmonary embolism. No evidence for acute airspace disease. Hepatic steatosis. Physical Exam:    General appearance - NAD, AOx 3   Lungs -CTAB, no R/R/R  Heart - RRR, no M/R/G  Abdomen - Soft, NT/ND  Neurological:  MAEx4, No focal motor deficit, sensory loss  Extremities - Cap refil <2 sec in all ext., no edema  Skin -warm, dry      Hospital Course:  Clinical course has improved, labs and imaging reviewed. Tony Roman originally presented to the hospital on 6/6/2020  9:32 PM. with acute left-sided chest pain and acute suicidal ideation. At that time it was determined that He required further observation and stress testing, cardiology evaluation, psychiatric evaluation. He was admitted and labs and imaging were followed daily. Imaging results as above. He is medically stable to be discharged. Acute left-sided chest pain with suicidal ideation. Tele-psych evaluation recommended inpatient management. Cardiology evaluation recommended stress test, stress test negative for acute pathology. Patient to follow-up with cardiology outpatient and continue home medications. After negative stress test reported patient medically cleared for discharge to [facility] for inpatient psychiatric management.     Disposition: Home    Patient stated that they will not drive

## 2020-06-09 LAB
SARS-COV-2, PCR: NORMAL
SARS-COV-2, RAPID: NORMAL
SARS-COV-2: NOT DETECTED
SOURCE: NORMAL

## 2020-06-09 PROCEDURE — 6370000000 HC RX 637 (ALT 250 FOR IP): Performed by: NURSE PRACTITIONER

## 2020-06-09 PROCEDURE — 1240000000 HC EMOTIONAL WELLNESS R&B

## 2020-06-09 PROCEDURE — 90792 PSYCH DIAG EVAL W/MED SRVCS: CPT | Performed by: REGISTERED NURSE

## 2020-06-09 PROCEDURE — 6370000000 HC RX 637 (ALT 250 FOR IP): Performed by: REGISTERED NURSE

## 2020-06-09 PROCEDURE — U0003 INFECTIOUS AGENT DETECTION BY NUCLEIC ACID (DNA OR RNA); SEVERE ACUTE RESPIRATORY SYNDROME CORONAVIRUS 2 (SARS-COV-2) (CORONAVIRUS DISEASE [COVID-19]), AMPLIFIED PROBE TECHNIQUE, MAKING USE OF HIGH THROUGHPUT TECHNOLOGIES AS DESCRIBED BY CMS-2020-01-R: HCPCS

## 2020-06-09 RX ORDER — METOPROLOL SUCCINATE 25 MG/1
25 TABLET, EXTENDED RELEASE ORAL DAILY
Status: DISCONTINUED | OUTPATIENT
Start: 2020-06-09 | End: 2020-06-17 | Stop reason: HOSPADM

## 2020-06-09 RX ORDER — HYDROXYZINE HYDROCHLORIDE 25 MG/1
25 TABLET, FILM COATED ORAL 3 TIMES DAILY PRN
Status: DISCONTINUED | OUTPATIENT
Start: 2020-06-09 | End: 2020-06-17 | Stop reason: HOSPADM

## 2020-06-09 RX ORDER — CETIRIZINE HYDROCHLORIDE 10 MG/1
10 TABLET ORAL DAILY
Status: DISCONTINUED | OUTPATIENT
Start: 2020-06-09 | End: 2020-06-17 | Stop reason: HOSPADM

## 2020-06-09 RX ORDER — IPRATROPIUM BROMIDE 21 UG/1
2 SPRAY, METERED NASAL 2 TIMES DAILY
Status: DISCONTINUED | OUTPATIENT
Start: 2020-06-09 | End: 2020-06-12

## 2020-06-09 RX ORDER — ACETAMINOPHEN 325 MG/1
650 TABLET ORAL EVERY 4 HOURS PRN
Status: DISCONTINUED | OUTPATIENT
Start: 2020-06-09 | End: 2020-06-17 | Stop reason: HOSPADM

## 2020-06-09 RX ORDER — DIVALPROEX SODIUM 500 MG/1
500 TABLET, DELAYED RELEASE ORAL EVERY 12 HOURS SCHEDULED
Status: DISCONTINUED | OUTPATIENT
Start: 2020-06-09 | End: 2020-06-10

## 2020-06-09 RX ORDER — DULOXETIN HYDROCHLORIDE 30 MG/1
30 CAPSULE, DELAYED RELEASE ORAL DAILY
Status: DISCONTINUED | OUTPATIENT
Start: 2020-06-09 | End: 2020-06-12

## 2020-06-09 RX ORDER — THIAMINE MONONITRATE (VIT B1) 100 MG
100 TABLET ORAL DAILY
Status: DISCONTINUED | OUTPATIENT
Start: 2020-06-09 | End: 2020-06-17 | Stop reason: HOSPADM

## 2020-06-09 RX ORDER — PANTOPRAZOLE SODIUM 40 MG/1
40 TABLET, DELAYED RELEASE ORAL DAILY
Status: DISCONTINUED | OUTPATIENT
Start: 2020-06-09 | End: 2020-06-17 | Stop reason: HOSPADM

## 2020-06-09 RX ORDER — MELOXICAM 7.5 MG/1
15 TABLET ORAL DAILY PRN
Status: DISCONTINUED | OUTPATIENT
Start: 2020-06-09 | End: 2020-06-17 | Stop reason: HOSPADM

## 2020-06-09 RX ORDER — M-VIT,TX,IRON,MINS/CALC/FOLIC 27MG-0.4MG
1 TABLET ORAL DAILY
Status: DISCONTINUED | OUTPATIENT
Start: 2020-06-09 | End: 2020-06-17 | Stop reason: HOSPADM

## 2020-06-09 RX ORDER — MAGNESIUM HYDROXIDE/ALUMINUM HYDROXICE/SIMETHICONE 120; 1200; 1200 MG/30ML; MG/30ML; MG/30ML
30 SUSPENSION ORAL EVERY 6 HOURS PRN
Status: DISCONTINUED | OUTPATIENT
Start: 2020-06-09 | End: 2020-06-17 | Stop reason: HOSPADM

## 2020-06-09 RX ORDER — FOLIC ACID 1 MG/1
1 TABLET ORAL DAILY
Status: DISCONTINUED | OUTPATIENT
Start: 2020-06-09 | End: 2020-06-17 | Stop reason: HOSPADM

## 2020-06-09 RX ADMIN — FOLIC ACID 1 MG: 1 TABLET ORAL at 17:04

## 2020-06-09 RX ADMIN — MELOXICAM 15 MG: 7.5 TABLET ORAL at 17:03

## 2020-06-09 RX ADMIN — MULTIPLE VITAMINS W/ MINERALS TAB 1 TABLET: TAB at 17:02

## 2020-06-09 RX ADMIN — THIAMINE HCL TAB 100 MG 100 MG: 100 TAB at 17:03

## 2020-06-09 RX ADMIN — ACETAMINOPHEN 650 MG: 325 TABLET, FILM COATED ORAL at 22:47

## 2020-06-09 RX ADMIN — CETIRIZINE HYDROCHLORIDE 10 MG: 10 TABLET, FILM COATED ORAL at 17:02

## 2020-06-09 RX ADMIN — PANTOPRAZOLE SODIUM 40 MG: 40 TABLET, DELAYED RELEASE ORAL at 17:03

## 2020-06-09 RX ADMIN — TRAZODONE HYDROCHLORIDE 50 MG: 50 TABLET ORAL at 22:47

## 2020-06-09 RX ADMIN — METOPROLOL SUCCINATE 25 MG: 25 TABLET, EXTENDED RELEASE ORAL at 17:04

## 2020-06-09 RX ADMIN — DIVALPROEX SODIUM 500 MG: 500 TABLET, DELAYED RELEASE ORAL at 22:47

## 2020-06-09 RX ADMIN — DULOXETINE 30 MG: 30 CAPSULE, DELAYED RELEASE ORAL at 17:03

## 2020-06-09 ASSESSMENT — PAIN SCALES - GENERAL
PAINLEVEL_OUTOF10: 7
PAINLEVEL_OUTOF10: 0
PAINLEVEL_OUTOF10: 4

## 2020-06-09 ASSESSMENT — PAIN - FUNCTIONAL ASSESSMENT
PAIN_FUNCTIONAL_ASSESSMENT: 0-10

## 2020-06-09 ASSESSMENT — SLEEP AND FATIGUE QUESTIONNAIRES
DIFFICULTY ARISING: NO
DIFFICULTY STAYING ASLEEP: YES
RESTFUL SLEEP: NO
DIFFICULTY FALLING ASLEEP: YES
AVERAGE NUMBER OF SLEEP HOURS: 2
DO YOU USE A SLEEP AID: NO
DO YOU HAVE DIFFICULTY SLEEPING: YES
SLEEP PATTERN: DIFFICULTY FALLING ASLEEP;RESTLESSNESS;DISTURBED/INTERRUPTED SLEEP;EARLY AWAKENING

## 2020-06-09 ASSESSMENT — PATIENT HEALTH QUESTIONNAIRE - PHQ9: SUM OF ALL RESPONSES TO PHQ QUESTIONS 1-9: 18

## 2020-06-09 ASSESSMENT — LIFESTYLE VARIABLES: HISTORY_ALCOHOL_USE: YES

## 2020-06-09 NOTE — VIRTUAL HEALTH
41 36 - 66 %    Lymphocytes 27 24 - 44 %    Monocytes 25 (H) 1 - 7 %    Eosinophils % 5 (H) 1 - 4 %    Basophils 2 0 - 2 %    Absolute Immature Granulocyte 0.00 0.00 - 0.30 k/uL    Segs Absolute 2.41 1.8 - 7.7 k/uL    Absolute Lymph # 1.59 1.0 - 4.8 k/uL    Absolute Mono # 1.48 (H) 0.1 - 0.8 k/uL    Absolute Eos # 0.30 0.0 - 0.4 k/uL    Basophils Absolute 0.12 0.0 - 0.2 k/uL    Morphology Normal    Magnesium    Collection Time: 06/06/20 10:45 PM   Result Value Ref Range    Magnesium 1.7 1.6 - 2.6 mg/dL   Troponin    Collection Time: 06/06/20 10:45 PM   Result Value Ref Range    Troponin, High Sensitivity 12 0 - 22 ng/L    Troponin T NOT REPORTED <0.03 ng/mL    Troponin Interp NOT REPORTED    TSH with Reflex    Collection Time: 06/06/20 10:45 PM   Result Value Ref Range    TSH 1.14 0.30 - 5.00 mIU/L   TOX SCR, BLD, ED    Collection Time: 06/06/20 10:45 PM   Result Value Ref Range    Acetaminophen Level <5 (L) 10 - 30 ug/mL    Ethanol 14 (H) <10 mg/dL    Ethanol percent 0.014 (H) <5.683 %    Salicylate Lvl <1 (L) 3 - 10 mg/dL    Toxic Tricyclic Sc,Blood NEGATIVE NEGATIVE   Immature Platelet Fraction    Collection Time: 06/06/20 10:45 PM   Result Value Ref Range    Platelet, Immature Fraction 6.6 1.1 - 10.3 %    Platelet, Fluorescence 125 (L) 138 - 453 k/uL   EKG 12 Lead    Collection Time: 06/07/20 12:29 AM   Result Value Ref Range    Ventricular Rate 81 BPM    Atrial Rate 81 BPM    P-R Interval 158 ms    QRS Duration 82 ms    Q-T Interval 376 ms    QTc Calculation (Bazett) 436 ms    P Axis 83 degrees    R Axis 34 degrees    T Axis 62 degrees   Troponin    Collection Time: 06/07/20 12:36 AM   Result Value Ref Range    Troponin, High Sensitivity 10 0 - 22 ng/L    Troponin T NOT REPORTED <0.03 ng/mL    Troponin Interp NOT REPORTED    COVID-19    Collection Time: 06/09/20  1:52 AM   Result Value Ref Range    SARS-CoV-2 PENDING     SARS-CoV-2, Rapid          Source . NASOPHARYNGEAL SWAB     SARS-CoV-2, PCR PENDING
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
## 2020-06-10 PROCEDURE — 99232 SBSQ HOSP IP/OBS MODERATE 35: CPT | Performed by: PSYCHIATRY & NEUROLOGY

## 2020-06-10 PROCEDURE — 6370000000 HC RX 637 (ALT 250 FOR IP): Performed by: REGISTERED NURSE

## 2020-06-10 PROCEDURE — 6370000000 HC RX 637 (ALT 250 FOR IP): Performed by: PSYCHIATRY & NEUROLOGY

## 2020-06-10 PROCEDURE — 6370000000 HC RX 637 (ALT 250 FOR IP): Performed by: NURSE PRACTITIONER

## 2020-06-10 PROCEDURE — 1240000000 HC EMOTIONAL WELLNESS R&B

## 2020-06-10 RX ORDER — RISPERIDONE 1 MG/1
0.5 TABLET, FILM COATED ORAL 2 TIMES DAILY
Status: DISCONTINUED | OUTPATIENT
Start: 2020-06-10 | End: 2020-06-11

## 2020-06-10 RX ADMIN — DIVALPROEX SODIUM 500 MG: 500 TABLET, DELAYED RELEASE ORAL at 09:22

## 2020-06-10 RX ADMIN — TRAZODONE HYDROCHLORIDE 50 MG: 50 TABLET ORAL at 20:48

## 2020-06-10 RX ADMIN — PANTOPRAZOLE SODIUM 40 MG: 40 TABLET, DELAYED RELEASE ORAL at 09:23

## 2020-06-10 RX ADMIN — RISPERIDONE 0.5 MG: 1 TABLET ORAL at 15:34

## 2020-06-10 RX ADMIN — CETIRIZINE HYDROCHLORIDE 10 MG: 10 TABLET, FILM COATED ORAL at 09:22

## 2020-06-10 RX ADMIN — METOPROLOL SUCCINATE 25 MG: 25 TABLET, EXTENDED RELEASE ORAL at 09:23

## 2020-06-10 RX ADMIN — DULOXETINE 30 MG: 30 CAPSULE, DELAYED RELEASE ORAL at 09:23

## 2020-06-10 RX ADMIN — DIVALPROEX SODIUM 750 MG: 500 TABLET, DELAYED RELEASE ORAL at 20:48

## 2020-06-10 RX ADMIN — MULTIPLE VITAMINS W/ MINERALS TAB 1 TABLET: TAB at 09:23

## 2020-06-10 RX ADMIN — THIAMINE HCL TAB 100 MG 100 MG: 100 TAB at 09:22

## 2020-06-10 RX ADMIN — FOLIC ACID 1 MG: 1 TABLET ORAL at 09:23

## 2020-06-10 RX ADMIN — RISPERIDONE 0.5 MG: 1 TABLET ORAL at 20:48

## 2020-06-10 ASSESSMENT — ENCOUNTER SYMPTOMS
CONSTIPATION: 0
COUGH: 0
DIARRHEA: 0
SHORTNESS OF BREATH: 0
ABDOMINAL PAIN: 0
SINUS PRESSURE: 0
CHEST TIGHTNESS: 1
SORE THROAT: 0
NAUSEA: 0
RHINORRHEA: 0
VOMITING: 0
WHEEZING: 0

## 2020-06-10 ASSESSMENT — PAIN - FUNCTIONAL ASSESSMENT: PAIN_FUNCTIONAL_ASSESSMENT: 0-10

## 2020-06-10 NOTE — H&P
pain.   Neurological: Negative. Psychiatric/Behavioral: Positive for agitation, decreased concentration, dysphoric mood, sleep disturbance and suicidal ideas. Negative for hallucinations. The patient is nervous/anxious. PHYSICAL EXAM:     Vitals: /82   Pulse 70   Temp 98.4 °F (36.9 °C) (Oral)   Resp 14   Ht 6' (1.829 m)   Wt 204 lb (92.5 kg)   SpO2 99%   BMI 27.67 kg/m²  Body mass index is 27.67 kg/m². Physical Exam   Constitutional: He is oriented to person, place, and time. Vital signs are normal. He is cooperative. No distress. HENT:   Head: Normocephalic and atraumatic. Right Ear: Hearing and external ear normal.   Left Ear: Hearing and external ear normal.   Nose: Nose normal.   Mouth/Throat: Uvula is midline and mucous membranes are normal. Abnormal dentition. Dental caries present. Posterior oropharyngeal erythema present. Eyes: Conjunctivae, EOM and lids are normal. No scleral icterus. Neck: Trachea normal. Neck supple. Cardiovascular: Normal rate and regular rhythm. No extrasystoles are present. Exam reveals no gallop. No murmur heard. Pulmonary/Chest: Effort normal and breath sounds normal.   Abdominal: Soft. Normal appearance and bowel sounds are normal. He exhibits no distension. There is no abdominal tenderness. Musculoskeletal:      Right ankle: He exhibits swelling and ecchymosis. Tenderness. Lateral malleolus tenderness found. Comments: Right lower leg with 2+ pitting edema, ecchymosis noted to the right lower anterior leg. Able to flex and extend with no pain. Left lower leg with 1+ non pitting edema. Lymphadenopathy:     He has no cervical adenopathy. Neurological: He is alert and oriented to person, place, and time. No cranial nerve deficit or sensory deficit. He exhibits normal muscle tone. Gait abnormal.   Skin: Skin is warm and dry. Psychiatric: Judgment normal. His mood appears anxious. His speech is delayed. He is agitated.  He exhibits a depressed mood. He expresses suicidal ideation. He exhibits abnormal recent memory. No interval changes. I concur with the findings.      KODY Santiago - CNP on 6/10/2020 at 8:54 AM
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
## 2020-06-11 ENCOUNTER — APPOINTMENT (OUTPATIENT)
Dept: CT IMAGING | Age: 50
DRG: 753 | End: 2020-06-11
Attending: PSYCHIATRY & NEUROLOGY
Payer: COMMERCIAL

## 2020-06-11 PROCEDURE — 6370000000 HC RX 637 (ALT 250 FOR IP): Performed by: PSYCHIATRY & NEUROLOGY

## 2020-06-11 PROCEDURE — 70450 CT HEAD/BRAIN W/O DYE: CPT

## 2020-06-11 PROCEDURE — 6370000000 HC RX 637 (ALT 250 FOR IP): Performed by: NURSE PRACTITIONER

## 2020-06-11 PROCEDURE — 6370000000 HC RX 637 (ALT 250 FOR IP): Performed by: REGISTERED NURSE

## 2020-06-11 PROCEDURE — 1240000000 HC EMOTIONAL WELLNESS R&B

## 2020-06-11 PROCEDURE — 99232 SBSQ HOSP IP/OBS MODERATE 35: CPT | Performed by: PSYCHIATRY & NEUROLOGY

## 2020-06-11 RX ORDER — RISPERIDONE 1 MG/1
1 TABLET, FILM COATED ORAL 2 TIMES DAILY
Status: DISCONTINUED | OUTPATIENT
Start: 2020-06-11 | End: 2020-06-12

## 2020-06-11 RX ADMIN — THIAMINE HCL TAB 100 MG 100 MG: 100 TAB at 09:37

## 2020-06-11 RX ADMIN — CETIRIZINE HYDROCHLORIDE 10 MG: 10 TABLET, FILM COATED ORAL at 09:38

## 2020-06-11 RX ADMIN — FOLIC ACID 1 MG: 1 TABLET ORAL at 09:38

## 2020-06-11 RX ADMIN — METOPROLOL SUCCINATE 25 MG: 25 TABLET, EXTENDED RELEASE ORAL at 09:38

## 2020-06-11 RX ADMIN — MULTIPLE VITAMINS W/ MINERALS TAB 1 TABLET: TAB at 09:38

## 2020-06-11 RX ADMIN — RISPERIDONE 0.5 MG: 1 TABLET ORAL at 09:37

## 2020-06-11 RX ADMIN — DIVALPROEX SODIUM 750 MG: 500 TABLET, DELAYED RELEASE ORAL at 22:02

## 2020-06-11 RX ADMIN — DIVALPROEX SODIUM 750 MG: 500 TABLET, DELAYED RELEASE ORAL at 09:38

## 2020-06-11 RX ADMIN — RISPERIDONE 1 MG: 1 TABLET ORAL at 22:02

## 2020-06-11 RX ADMIN — HYDROXYZINE HYDROCHLORIDE 25 MG: 25 TABLET, FILM COATED ORAL at 22:02

## 2020-06-11 RX ADMIN — ACETAMINOPHEN 650 MG: 325 TABLET, FILM COATED ORAL at 12:54

## 2020-06-11 RX ADMIN — PANTOPRAZOLE SODIUM 40 MG: 40 TABLET, DELAYED RELEASE ORAL at 09:37

## 2020-06-11 RX ADMIN — DULOXETINE 30 MG: 30 CAPSULE, DELAYED RELEASE ORAL at 09:38

## 2020-06-11 RX ADMIN — TRAZODONE HYDROCHLORIDE 50 MG: 50 TABLET ORAL at 22:02

## 2020-06-11 ASSESSMENT — PAIN - FUNCTIONAL ASSESSMENT
PAIN_FUNCTIONAL_ASSESSMENT: 0-10
PAIN_FUNCTIONAL_ASSESSMENT: 0-10

## 2020-06-11 ASSESSMENT — PAIN SCALES - GENERAL: PAINLEVEL_OUTOF10: 2

## 2020-06-11 NOTE — GROUP NOTE
Group Therapy Note    Date: 6/11/2020    Group Start Time: 1000  Group End Time: 1030  Group Topic: Psychotherapy    [facility]    Carmen Heller        Group Therapy Note    Attendees: 11/18         Patient's Goal:  PT will demonstrate increased interpersonal interaction and a clear understanding on multiple types of coping skills relating to the here-and-now therapeutic practice. Notes:  Pt was an active listener during group discussion on this date. Status After Intervention:  Improved    Participation Level: Active Listener and Interactive    Participation Quality: Appropriate and Attentive      Speech:  normal      Thought Process/Content: Logical      Affective Functioning: Flat      Mood: depressed      Level of consciousness:  Alert, Oriented x4 and Attentive      Response to Learning: Able to verbalize current knowledge/experience and Progressing to goal      Endings: None Reported    Modes of Intervention: Support, Socialization, Exploration, Clarifying and Problem-solving      Discipline Responsible: /Counselor      Signature:   Carmen Heller

## 2020-06-11 NOTE — PROGRESS NOTES
Comment: 12 beers daily    Drug use: Not Currently     Types: Marijuana    Sexual activity: Not on file   Lifestyle    Physical activity     Days per week: Not on file     Minutes per session: Not on file    Stress: Not on file   Relationships    Social connections     Talks on phone: Not on file     Gets together: Not on file     Attends Spiritism service: Not on file     Active member of club or organization: Not on file     Attends meetings of clubs or organizations: Not on file     Relationship status: Not on file    Intimate partner violence     Fear of current or ex partner: Not on file     Emotionally abused: Not on file     Physically abused: Not on file     Forced sexual activity: Not on file   Other Topics Concern    Not on file   Social History Narrative    Not on file           ROS:  [x] All negative/unchanged except if checked.  Explain positive(checked items) below:  [] Constitutional  [] Eyes  [] Ear/Nose/Mouth/Throat  [] Respiratory  [] CV  [] GI  []   [] Musculoskeletal  [] Skin/Breast  [] Neurological  [] Endocrine  [] Heme/Lymph  [] Allergic/Immunologic    Explanation:     MEDICATIONS:    Current Facility-Administered Medications:     risperiDONE (RISPERDAL) tablet 1 mg, 1 mg, Oral, BID, Amberly Morales MD    divalproex (DEPAKOTE) DR tablet 750 mg, 750 mg, Oral, 2 times per day, Amberly Morales MD, 750 mg at 06/11/20 2641    acetaminophen (TYLENOL) tablet 650 mg, 650 mg, Oral, Q4H PRN, KODY Manjarrez, 650 mg at 06/09/20 2247    magnesium hydroxide (MILK OF MAGNESIA) 400 MG/5ML suspension 30 mL, 30 mL, Oral, Daily PRN, KODY Manjarrez    aluminum & magnesium hydroxide-simethicone (MAALOX) 200-200-20 MG/5ML suspension 30 mL, 30 mL, Oral, Q6H PRN, KODY Sweet    hydrOXYzine (ATARAX) tablet 25 mg, 25 mg, Oral, TID PRN, KODY Sweet    DULoxetine (CYMBALTA) extended release capsule 30 mg, 30 mg, Oral, Daily, KOYD Sweet, 30 mg at 81/59/32 3411    folic acid (FOLVITE) tablet 1 mg, 1 mg, Oral, Daily, KODY Sweet - CNS, 1 mg at 06/11/20 6730    ipratropium (ATROVENT) 0.03 % nasal spray 2 spray, 2 spray, Nasal, BID, Renuka Cabrales APRN - CNS    cetirizine (ZYRTEC) tablet 10 mg, 10 mg, Oral, Daily, KODY Sweet - CNS, 10 mg at 06/11/20 6068    meloxicam (MOBIC) tablet 15 mg, 15 mg, Oral, Daily PRN, Stacie Wright, KODY - CNS, 15 mg at 06/09/20 1703    metoprolol succinate (TOPROL XL) extended release tablet 25 mg, 25 mg, Oral, Daily, KODY Sweet - CNS, 25 mg at 06/11/20 7641    therapeutic multivitamin-minerals 1 tablet, 1 tablet, Oral, Daily, KODY Sweet - CNS, 1 tablet at 06/11/20 7313    pantoprazole (PROTONIX) tablet 40 mg, 40 mg, Oral, Daily, KODY Sweet - CNS, 40 mg at 06/11/20 4144    vitamin B-1 (THIAMINE) tablet 100 mg, 100 mg, Oral, Daily, KODY Sweet - CNS, 100 mg at 06/11/20 0937    nicotine (NICODERM CQ) 14 MG/24HR 1 patch, 1 patch, Transdermal, Daily, Marcella Julien MD    traZODone (DESYREL) tablet 50 mg, 50 mg, Oral, Nightly PRN, KODY Jo - CNP, 50 mg at 06/10/20 2048      Examination:  /72   Pulse 66   Temp 98.1 °F (36.7 °C) (Oral)   Resp 16   Ht 6' (1.829 m)   Wt 204 lb (92.5 kg)   SpO2 99%   BMI 27.67 kg/m²   Gait - steady  Medication side effects(SE): none    Mental Status Examination:    Level of consciousness:  within normal limits   Appearance:  poor grooming and poor hygiene  Behavior/Motor:  no abnormalities noted  Attitude toward examiner:  cooperative  Speech:  spontaneous   Mood: anxious and constricted  Affect:  mood congruent  Thought processes:  coherent   Thought content:  Homicidal ideation - none  Suicidal Ideation:  denies suicidal ideation  Delusions:  paranoid  Perceptual Disturbance:  auditory and visual  Cognition:  oriented to person, place, and time   Concentration distractible  Insight fair   Judgement fair     ASSESSMENT:   Patient under the [facility], 1135 waiver authority and the [employer] Act, this Virtual Visit was conducted with patient's (and/or legal guardian's) consent, to reduce the patient's risk of exposure to COVID-19 and provide necessary medical care. Services were provided through a video synchronous discussion virtually to substitute for in-person visit by provider. Patient is present at [facility]  and I am physically present at my home in [place] [address], [place] on 6/11/2020 at 11:46 AM    An electronic signature was used to authenticate this note. **This report has been created using voice recognition software. It may contain minor errors which are inherent in voice recognition technology. **

## 2020-06-12 PROCEDURE — 99232 SBSQ HOSP IP/OBS MODERATE 35: CPT | Performed by: PSYCHIATRY & NEUROLOGY

## 2020-06-12 PROCEDURE — 6370000000 HC RX 637 (ALT 250 FOR IP): Performed by: NURSE PRACTITIONER

## 2020-06-12 PROCEDURE — 6370000000 HC RX 637 (ALT 250 FOR IP): Performed by: REGISTERED NURSE

## 2020-06-12 PROCEDURE — 6370000000 HC RX 637 (ALT 250 FOR IP): Performed by: PSYCHIATRY & NEUROLOGY

## 2020-06-12 PROCEDURE — 1240000000 HC EMOTIONAL WELLNESS R&B

## 2020-06-12 RX ORDER — RISPERIDONE 3 MG/1
1.5 TABLET, FILM COATED ORAL 2 TIMES DAILY
Status: DISCONTINUED | OUTPATIENT
Start: 2020-06-12 | End: 2020-06-13

## 2020-06-12 RX ORDER — DULOXETIN HYDROCHLORIDE 20 MG/1
40 CAPSULE, DELAYED RELEASE ORAL DAILY
Status: DISCONTINUED | OUTPATIENT
Start: 2020-06-13 | End: 2020-06-17 | Stop reason: HOSPADM

## 2020-06-12 RX ADMIN — RISPERIDONE 1 MG: 1 TABLET ORAL at 08:26

## 2020-06-12 RX ADMIN — DULOXETINE 30 MG: 30 CAPSULE, DELAYED RELEASE ORAL at 08:27

## 2020-06-12 RX ADMIN — DIVALPROEX SODIUM 750 MG: 500 TABLET, DELAYED RELEASE ORAL at 08:26

## 2020-06-12 RX ADMIN — THIAMINE HCL TAB 100 MG 100 MG: 100 TAB at 08:27

## 2020-06-12 RX ADMIN — RISPERIDONE 1.5 MG: 3 TABLET ORAL at 21:23

## 2020-06-12 RX ADMIN — HYDROXYZINE HYDROCHLORIDE 25 MG: 25 TABLET, FILM COATED ORAL at 08:27

## 2020-06-12 RX ADMIN — METOPROLOL SUCCINATE 25 MG: 25 TABLET, EXTENDED RELEASE ORAL at 08:27

## 2020-06-12 RX ADMIN — DIVALPROEX SODIUM 750 MG: 500 TABLET, DELAYED RELEASE ORAL at 21:23

## 2020-06-12 RX ADMIN — FOLIC ACID 1 MG: 1 TABLET ORAL at 08:26

## 2020-06-12 RX ADMIN — CETIRIZINE HYDROCHLORIDE 10 MG: 10 TABLET, FILM COATED ORAL at 08:26

## 2020-06-12 RX ADMIN — HYDROXYZINE HYDROCHLORIDE 25 MG: 25 TABLET, FILM COATED ORAL at 21:23

## 2020-06-12 RX ADMIN — PANTOPRAZOLE SODIUM 40 MG: 40 TABLET, DELAYED RELEASE ORAL at 08:26

## 2020-06-12 RX ADMIN — TRAZODONE HYDROCHLORIDE 50 MG: 50 TABLET ORAL at 21:23

## 2020-06-12 RX ADMIN — MULTIPLE VITAMINS W/ MINERALS TAB 1 TABLET: TAB at 08:26

## 2020-06-12 NOTE — GROUP NOTE
Group Therapy Note    Date: 6/12/2020    Group Start Time: 0900  Group End Time: 0915  Group Topic: Padmini Barajas [place]        Group Therapy Note    Attendees: 3    Pt did not attend [employer] d/t resting in room despite staff invitation to attend. 1:1 talk time offered as alternative to group session, pt declined.

## 2020-06-13 PROCEDURE — 6370000000 HC RX 637 (ALT 250 FOR IP): Performed by: NURSE PRACTITIONER

## 2020-06-13 PROCEDURE — 6370000000 HC RX 637 (ALT 250 FOR IP): Performed by: REGISTERED NURSE

## 2020-06-13 PROCEDURE — 6370000000 HC RX 637 (ALT 250 FOR IP): Performed by: PSYCHIATRY & NEUROLOGY

## 2020-06-13 PROCEDURE — 1240000000 HC EMOTIONAL WELLNESS R&B

## 2020-06-13 PROCEDURE — 99232 SBSQ HOSP IP/OBS MODERATE 35: CPT | Performed by: NURSE PRACTITIONER

## 2020-06-13 RX ORDER — RISPERIDONE 2 MG/1
2 TABLET, FILM COATED ORAL 2 TIMES DAILY
Status: DISCONTINUED | OUTPATIENT
Start: 2020-06-13 | End: 2020-06-17 | Stop reason: HOSPADM

## 2020-06-13 RX ADMIN — CETIRIZINE HYDROCHLORIDE 10 MG: 10 TABLET, FILM COATED ORAL at 08:22

## 2020-06-13 RX ADMIN — THIAMINE HCL TAB 100 MG 100 MG: 100 TAB at 08:22

## 2020-06-13 RX ADMIN — PANTOPRAZOLE SODIUM 40 MG: 40 TABLET, DELAYED RELEASE ORAL at 08:22

## 2020-06-13 RX ADMIN — RISPERIDONE 1.5 MG: 3 TABLET ORAL at 08:23

## 2020-06-13 RX ADMIN — DULOXETINE 40 MG: 20 CAPSULE, DELAYED RELEASE ORAL at 08:22

## 2020-06-13 RX ADMIN — RISPERIDONE 2 MG: 2 TABLET ORAL at 20:50

## 2020-06-13 RX ADMIN — FOLIC ACID 1 MG: 1 TABLET ORAL at 08:22

## 2020-06-13 RX ADMIN — DIVALPROEX SODIUM 750 MG: 500 TABLET, DELAYED RELEASE ORAL at 20:50

## 2020-06-13 RX ADMIN — MULTIPLE VITAMINS W/ MINERALS TAB 1 TABLET: TAB at 08:22

## 2020-06-13 RX ADMIN — METOPROLOL SUCCINATE 25 MG: 25 TABLET, EXTENDED RELEASE ORAL at 08:22

## 2020-06-13 RX ADMIN — HYDROXYZINE HYDROCHLORIDE 25 MG: 25 TABLET, FILM COATED ORAL at 20:50

## 2020-06-13 RX ADMIN — TRAZODONE HYDROCHLORIDE 50 MG: 50 TABLET ORAL at 20:50

## 2020-06-13 RX ADMIN — DIVALPROEX SODIUM 750 MG: 500 TABLET, DELAYED RELEASE ORAL at 08:22

## 2020-06-13 ASSESSMENT — PAIN - FUNCTIONAL ASSESSMENT: PAIN_FUNCTIONAL_ASSESSMENT: 0-10

## 2020-06-13 NOTE — GROUP NOTE
Group Therapy Note    Date: 6/13/2020    Group Start Time: 1600  Group End Time: 8018  Group Topic: Healthy Living/Wellness    Stu Starks        Group Therapy Note    Attendees: 10/21         Patient's Goal:  To practice self reflection    Notes:      Status After Intervention:  Improved    Participation Level:  Active Listener    Participation Quality: Appropriate      Speech:  normal      Thought Process/Content: Logical      Affective Functioning: Congruent      Mood: normal      Level of consciousness:  Alert      Response to Learning: Able to verbalize current knowledge/experience      Endings: None Reported    Modes of Intervention: Education and Exploration      Discipline Responsible: [address], [employer]      Signature:  Mira Yun

## 2020-06-13 NOTE — VIRTUAL HEALTH
Department of Psychiatry  Attending Progress Note  Chief Complaint: Bipolar I disorder, most recent episode depressed ([address]     SUBJECTIVE: Met with patient in conference room with staff. States he continues to have visual hallucinations of \"things moving around me. \"  States that this continual motion of inanimate objects continues to concern him and caused him increased worry. Continues to endorse depressive symptoms, passive suicidal thoughts. OBJECTIVE    Physical  /71   Pulse 78   Temp 97.6 °F (36.4 °C) (Oral)   Resp 14   Ht 6' (1.829 m)   Wt 204 lb (92.5 kg)   SpO2 99%   BMI 27.67 kg/m²      Mental Status Evaluation:  Orientation: alertness: lethargic   Mood:. anxious and constricted      Affect:  constricted      Appearance:  age appropriate and bearded   Activity:  Psychomotor Retardation   Gait/Posture: Normal   Speech:  normal pitch and normal volume   Thought Process:  circumstantial   Thought Content:  Suicidal, VH   Sensorium:  person, place, time/date and situation   Cognition:  WNL   Memory: intact   Insight:  limited   Judgment: limited   Suicidal Ideations: passive   Homicidal Ideations: Negative for homicidal ideation      Medication Side Effects: absent       Attention Span attention span appeared shorter than expected for age       Labs  No results found for this or any previous visit (from the past 67 hour(s)).     Medications  Current Facility-Administered Medications   Medication Dose Route Frequency Provider Last Rate Last Dose    risperiDONE (RISPERDAL) tablet 2 mg  2 mg Oral BID Jean-Paul Malodnado, RN, NP        DULoxetine (CYMBALTA) extended release capsule 40 mg  40 mg Oral Daily Mary Grace Edward MD   40 mg at 06/13/20 8134    divalproex (DEPAKOTE) DR tablet 750 mg  750 mg Oral 2 times per day Mary Grace Edward MD   750 mg at 06/13/20 2316    acetaminophen (TYLENOL) tablet 650 mg  650 mg Oral Q4H PRN KODY Sánchez - CNS   650 mg at 06/11/20 1254    magnesium hydroxide DepMaxi raimbalta  3. Increase risperidone 2 mg twice daily. 4. JESSICA, LFT, Ammonia labs 6/15/2020 in am      Electronically signed by Nate Brooks RN, NP on 6/13/2020 at 2:45 PM.    Dragon voice recognition software used in portions of this document. Patient Location:  [address] [place] [address]    Provider Location ([facility]): [facility]    This virtual visit was conducted via interactive/real-time audio/video.

## 2020-06-13 NOTE — GROUP NOTE
Group Therapy Note    Date: 6/13/2020    Group Start Time: 0900  Group End Time: 0940  Group Topic: Community Meeting    [facility] ARABELLA Sanchez, [address]        Group Therapy Note    Attendees: 6/18         Pt did not participate in Community Meeting/Goals Group at 900am when encouraged by RT due to resting in room. Pt was offered talk time as an alternative to group but declined.       Discipline Responsible: Psychoeducational Specialist      Signature:  Megan Elise

## 2020-06-14 PROCEDURE — 6370000000 HC RX 637 (ALT 250 FOR IP): Performed by: REGISTERED NURSE

## 2020-06-14 PROCEDURE — 6370000000 HC RX 637 (ALT 250 FOR IP): Performed by: PSYCHIATRY & NEUROLOGY

## 2020-06-14 PROCEDURE — 1240000000 HC EMOTIONAL WELLNESS R&B

## 2020-06-14 PROCEDURE — 6370000000 HC RX 637 (ALT 250 FOR IP): Performed by: NURSE PRACTITIONER

## 2020-06-14 PROCEDURE — 99232 SBSQ HOSP IP/OBS MODERATE 35: CPT | Performed by: NURSE PRACTITIONER

## 2020-06-14 RX ADMIN — MULTIPLE VITAMINS W/ MINERALS TAB 1 TABLET: TAB at 09:04

## 2020-06-14 RX ADMIN — RISPERIDONE 2 MG: 2 TABLET ORAL at 09:04

## 2020-06-14 RX ADMIN — DIVALPROEX SODIUM 750 MG: 500 TABLET, DELAYED RELEASE ORAL at 21:07

## 2020-06-14 RX ADMIN — RISPERIDONE 2 MG: 2 TABLET ORAL at 21:07

## 2020-06-14 RX ADMIN — MELOXICAM 15 MG: 7.5 TABLET ORAL at 09:07

## 2020-06-14 RX ADMIN — TRAZODONE HYDROCHLORIDE 50 MG: 50 TABLET ORAL at 21:07

## 2020-06-14 RX ADMIN — CETIRIZINE HYDROCHLORIDE 10 MG: 10 TABLET, FILM COATED ORAL at 09:04

## 2020-06-14 RX ADMIN — FOLIC ACID 1 MG: 1 TABLET ORAL at 09:04

## 2020-06-14 RX ADMIN — HYDROXYZINE HYDROCHLORIDE 25 MG: 25 TABLET, FILM COATED ORAL at 21:07

## 2020-06-14 RX ADMIN — PANTOPRAZOLE SODIUM 40 MG: 40 TABLET, DELAYED RELEASE ORAL at 09:04

## 2020-06-14 RX ADMIN — THIAMINE HCL TAB 100 MG 100 MG: 100 TAB at 09:04

## 2020-06-14 RX ADMIN — ACETAMINOPHEN 650 MG: 325 TABLET, FILM COATED ORAL at 20:14

## 2020-06-14 RX ADMIN — DIVALPROEX SODIUM 750 MG: 500 TABLET, DELAYED RELEASE ORAL at 09:04

## 2020-06-14 RX ADMIN — DULOXETINE 40 MG: 20 CAPSULE, DELAYED RELEASE ORAL at 09:04

## 2020-06-14 ASSESSMENT — PAIN SCALES - GENERAL
PAINLEVEL_OUTOF10: 8
PAINLEVEL_OUTOF10: 8
PAINLEVEL_OUTOF10: 0
PAINLEVEL_OUTOF10: 0

## 2020-06-14 NOTE — GROUP NOTE
Group Therapy Note    Date: 6/14/2020    Group Start Time: 1330  Group End Time: 1181  Group Topic: Cognitive Skills    ZORAIDA Peralta        Group Therapy Note    Attendees: 8/20         Patient's Goal:  To increase social interaction and to practice deductive reasoning and communication. Notes: Pt participated fully in group task . Pt was able to practice deductive reasoning and communication independently and was given prompts r/t problem solving to work on solving clue if needed. Status After Intervention:  Improved    Participation Level:  Active Listener and Interactive    Participation Quality: Appropriate, Attentive, Sharing and Supportive      Speech:  normal      Thought Process/Content: Logical  Linear      Affective Functioning: Congruent      Mood: euthymic      Level of consciousness:  Alert, Oriented x4 and Attentive      Response to Learning: Able to verbalize current knowledge/experience, Able to verbalize/acknowledge new learning, Able to retain information and Progressing to goal      Endings: None Reported    Modes of Intervention: Education, Support, Socialization, Exploration, Clarifying and Problem-solving      Discipline Responsible: Psychoeducational Specialist      Signature:  Leopold Staggers

## 2020-06-14 NOTE — VIRTUAL HEALTH
Department of Psychiatry  Attending Progress Note  Chief Complaint: Bipolar I disorder, most recent episode depressed ([address]     SUBJECTIVE: Met with patient in conference room with staff. Patient ports that he was \"a little dizzy this morning. \"  Discussed need for appropriate hydration which may benefit his blood pressure. Also states that he has a little bit of a headache. Did not state or claim any visual hallucinations as he did yesterday. Continues to discuss and complain of low mood, worthlessness, hopelessness. OBJECTIVE    Physical  BP (!) 88/54   Pulse 86   Temp 98 °F (36.7 °C) (Oral)   Resp 14   Ht 6' (1.829 m)   Wt 204 lb (92.5 kg)   SpO2 99%   BMI 27.67 kg/m²      Mental Status Evaluation:  Orientation: alertness: lethargic   Mood:. anxious and constricted      Affect:  constricted      Appearance:  age appropriate and bearded   Activity:  Psychomotor Retardation   Gait/Posture: Normal   Speech:  normal pitch and normal volume   Thought Process:  circumstantial   Thought Content:  Suicidal, VH   Sensorium:  person, place, time/date and situation   Cognition:  WNL   Memory: intact   Insight:  limited   Judgment: limited   Suicidal Ideations: passive   Homicidal Ideations: Negative for homicidal ideation      Medication Side Effects:  Possible dizziness/headaches from use of medication. We will continue to monitor. Attention Span attention span appeared shorter than expected for age       Labs  No results found for this or any previous visit (from the past 67 hour(s)).     Medications  Current Facility-Administered Medications   Medication Dose Route Frequency Provider Last Rate Last Dose    risperiDONE (RISPERDAL) tablet 2 mg  2 mg Oral BID Amparo Calderon RN, NP   2 mg at 06/14/20 0904    DULoxetine (CYMBALTA) extended release capsule 40 mg  40 mg Oral Daily Onesimo Masterson MD   40 mg at 06/14/20 0904    divalproex (DEPAKOTE) DR tablet 750 mg  750 mg Oral 2 times per day Shaquille Barron

## 2020-06-14 NOTE — GROUP NOTE
Group Therapy Note    Date: 6/14/2020    Group Start Time: 0900  Group End Time: 5085  Group Topic: Community Meeting    JULIUS Diaz [place], [place]        Group Therapy Note    Attendees: 3/23       Pt did not participate in Community Meeting/Goals Group at 900am when encouraged by RT due to resting in room. Pt was offered talk time as an alternative to group but declined.       Discipline Responsible: Psychoeducational Specialist      Signature:  Torito Veras

## 2020-06-15 LAB
ALBUMIN SERPL-MCNC: 3.7 G/DL (ref 3.5–5.2)
ALBUMIN/GLOBULIN RATIO: NORMAL (ref 1–2.5)
ALP BLD-CCNC: 72 U/L (ref 40–129)
ALT SERPL-CCNC: 10 U/L (ref 5–41)
AMMONIA: 119 UMOL/L (ref 16–60)
AST SERPL-CCNC: 17 U/L
BILIRUB SERPL-MCNC: 0.39 MG/DL (ref 0.3–1.2)
BILIRUBIN DIRECT: 0.12 MG/DL
BILIRUBIN, INDIRECT: 0.27 MG/DL (ref 0–1)
GLOBULIN: NORMAL G/DL (ref 1.5–3.8)
TOTAL PROTEIN: 6.5 G/DL (ref 6.4–8.3)
VALPROIC ACID LEVEL: 69 UG/ML (ref 50–125)
VALPROIC DATE LAST DOSE: NORMAL
VALPROIC DOSE AMOUNT: NORMAL
VALPROIC TIME LAST DOSE: 2107

## 2020-06-15 PROCEDURE — 6370000000 HC RX 637 (ALT 250 FOR IP): Performed by: PSYCHIATRY & NEUROLOGY

## 2020-06-15 PROCEDURE — 99232 SBSQ HOSP IP/OBS MODERATE 35: CPT | Performed by: PSYCHIATRY & NEUROLOGY

## 2020-06-15 PROCEDURE — 80076 HEPATIC FUNCTION PANEL: CPT

## 2020-06-15 PROCEDURE — 82140 ASSAY OF AMMONIA: CPT

## 2020-06-15 PROCEDURE — 6370000000 HC RX 637 (ALT 250 FOR IP): Performed by: REGISTERED NURSE

## 2020-06-15 PROCEDURE — 6370000000 HC RX 637 (ALT 250 FOR IP): Performed by: NURSE PRACTITIONER

## 2020-06-15 PROCEDURE — 1240000000 HC EMOTIONAL WELLNESS R&B

## 2020-06-15 PROCEDURE — 80164 ASSAY DIPROPYLACETIC ACD TOT: CPT

## 2020-06-15 PROCEDURE — 36415 COLL VENOUS BLD VENIPUNCTURE: CPT

## 2020-06-15 RX ORDER — TRAZODONE HYDROCHLORIDE 50 MG/1
50 TABLET ORAL NIGHTLY PRN
Qty: 30 TABLET | Refills: 0 | Status: ON HOLD | OUTPATIENT
Start: 2020-06-15 | End: 2021-03-17 | Stop reason: HOSPADM

## 2020-06-15 RX ORDER — DULOXETINE 40 MG/1
40 CAPSULE, DELAYED RELEASE ORAL DAILY
Qty: 30 CAPSULE | Refills: 3 | Status: ON HOLD | OUTPATIENT
Start: 2020-06-16 | End: 2021-03-17 | Stop reason: HOSPADM

## 2020-06-15 RX ORDER — THIAMINE MONONITRATE (VIT B1) 100 MG
100 TABLET ORAL DAILY
Qty: 30 TABLET | Refills: 5 | Status: SHIPPED | OUTPATIENT
Start: 2020-06-15 | End: 2021-04-08

## 2020-06-15 RX ORDER — RISPERIDONE 2 MG/1
2 TABLET, FILM COATED ORAL 2 TIMES DAILY
Qty: 60 TABLET | Refills: 3 | Status: SHIPPED | OUTPATIENT
Start: 2020-06-15 | End: 2021-04-08

## 2020-06-15 RX ORDER — LEVOCARNITINE 330 MG/1
990 TABLET ORAL 2 TIMES DAILY
Status: DISCONTINUED | OUTPATIENT
Start: 2020-06-15 | End: 2020-06-17 | Stop reason: HOSPADM

## 2020-06-15 RX ORDER — DIVALPROEX SODIUM 500 MG/1
500 TABLET, DELAYED RELEASE ORAL EVERY 12 HOURS SCHEDULED
Status: DISCONTINUED | OUTPATIENT
Start: 2020-06-15 | End: 2020-06-15

## 2020-06-15 RX ORDER — PANTOPRAZOLE SODIUM 40 MG/1
40 TABLET, DELAYED RELEASE ORAL DAILY
Qty: 30 TABLET | Refills: 3 | Status: SHIPPED | OUTPATIENT
Start: 2020-06-16 | End: 2021-04-07

## 2020-06-15 RX ORDER — METOPROLOL SUCCINATE 25 MG/1
25 TABLET, EXTENDED RELEASE ORAL DAILY
Qty: 30 TABLET | Refills: 3 | Status: ON HOLD | OUTPATIENT
Start: 2020-06-16 | End: 2021-03-17 | Stop reason: HOSPADM

## 2020-06-15 RX ORDER — FOLIC ACID 1 MG/1
1 TABLET ORAL DAILY
Qty: 30 TABLET | Refills: 5 | Status: SHIPPED | OUTPATIENT
Start: 2020-06-15 | End: 2021-04-08

## 2020-06-15 RX ORDER — DIVALPROEX SODIUM 250 MG/1
750 TABLET, DELAYED RELEASE ORAL EVERY 12 HOURS SCHEDULED
Qty: 180 TABLET | Refills: 3 | Status: SHIPPED | OUTPATIENT
Start: 2020-06-15 | End: 2020-06-16 | Stop reason: HOSPADM

## 2020-06-15 RX ADMIN — LEVOCARNITINE 990 MG: 330 TABLET ORAL at 21:38

## 2020-06-15 RX ADMIN — TRAZODONE HYDROCHLORIDE 50 MG: 50 TABLET ORAL at 21:38

## 2020-06-15 RX ADMIN — RISPERIDONE 2 MG: 2 TABLET ORAL at 21:38

## 2020-06-15 RX ADMIN — FOLIC ACID 1 MG: 1 TABLET ORAL at 09:32

## 2020-06-15 RX ADMIN — MULTIPLE VITAMINS W/ MINERALS TAB 1 TABLET: TAB at 09:32

## 2020-06-15 RX ADMIN — HYDROXYZINE HYDROCHLORIDE 25 MG: 25 TABLET, FILM COATED ORAL at 21:38

## 2020-06-15 RX ADMIN — CETIRIZINE HYDROCHLORIDE 10 MG: 10 TABLET, FILM COATED ORAL at 09:32

## 2020-06-15 RX ADMIN — METOPROLOL SUCCINATE 25 MG: 25 TABLET, EXTENDED RELEASE ORAL at 09:29

## 2020-06-15 RX ADMIN — DULOXETINE 40 MG: 20 CAPSULE, DELAYED RELEASE ORAL at 09:32

## 2020-06-15 RX ADMIN — THIAMINE HCL TAB 100 MG 100 MG: 100 TAB at 09:32

## 2020-06-15 RX ADMIN — DIVALPROEX SODIUM 750 MG: 500 TABLET, DELAYED RELEASE ORAL at 09:32

## 2020-06-15 RX ADMIN — LEVOCARNITINE 990 MG: 330 TABLET ORAL at 15:53

## 2020-06-15 RX ADMIN — PANTOPRAZOLE SODIUM 40 MG: 40 TABLET, DELAYED RELEASE ORAL at 09:32

## 2020-06-15 RX ADMIN — RISPERIDONE 2 MG: 2 TABLET ORAL at 09:32

## 2020-06-15 NOTE — CARE COORDINATION
Substance Abuse Treatment & D/C planning:      [facility] [place] [facility] Completion  [phone]                Fax: [phone]          MARBIN in chart. Patient reports he spoke with , Kashif Hernandez, who requested records sent to review for admission. This writer submitted records and provided direct contact info as well as unit SW phone number for coordination of care.

## 2020-06-15 NOTE — PROGRESS NOTES
CLINICAL PHARMACY NOTE: MEDS TO [address] Select Patient?: No  Total # of Prescriptions Filled: 7   The following medications were delivered to the patient:  · Duloxetine  · Vitamin B  · trazodone  · Metoprolol  · Folic acid  · Pantoprazole  · Risperidone  · Depakote  ·   Total # of Interventions Completed: 0  Time Spent (min): 30    Additional Documentation:

## 2020-06-15 NOTE — GROUP NOTE
Group Therapy Note    Date: 6/15/2020    Group Start Time: 0900  Group End Time: 3618  Group Topic: Community Meeting    [facility], [facility]    Pt did not attend 0900 community meeting/ goal setting group d/t resting in room despite staff invitation to attend. 1:1 talk time offered as alternative to group session, pt declined.             Signature:  Julita Villa

## 2020-06-15 NOTE — PLAN OF CARE
Problem: Depressive Behavior With or Without Suicide Precautions:  Goal: Able to verbalize acceptance of life and situations over which he or she has no control  Description: Able to verbalize acceptance of life and situations over which he or she has no control  6/15/2020 0954 by Batool Guzman LPN  Outcome: Ongoing  Note: Patient is alert and oriented x4. Patient denies any suicidal ideations at this time. Patient is flat, depressed and has mild dizziness. Blood pressure medication this morning was held per patient request. Patient is isolative to room but comes out for needs. Patient communicates well with staff. Staff will continue to do 15 minute safety checks per facility protocol. Problem: Depressive Behavior With or Without Suicide Precautions:  Goal: Absence of self-harm  Description: Absence of self-harm  6/15/2020 0954 by Batool Guzman LPN  Outcome: Ongoing  Note: Patient is alert and oriented x4. Patient denies any thoughts to harm self and remains free from any self injury at this time. Staff will continue to do 15 minute safety checks per facility protocol.
Problem: Depressive Behavior With or Without Suicide Precautions:  Goal: Able to verbalize acceptance of life and situations over which he or she has no control  Description: Able to verbalize acceptance of life and situations over which he or she has no control  Outcome: Ongoing     Problem: Depressive Behavior With or Without Suicide Precautions:  Goal: Absence of self-harm  Description: Absence of self-harm  Outcome: Ongoing   Patient continues to display depressive symptoms, maintains a flat affect with minimal insight as to symptoms or other coping skills. Patient remains free from harm self and environmental denies suicidal homicidal ideations and hallucinations this shift.
Problem: Pain:  Goal: Pain level will decrease  Description: Pain level will decrease  6/10/2020 2327 by Olga Stewart RN  Outcome: Ongoing  Note: Patient denies pain to writer this evening. Problem: Depressive Behavior With or Without Suicide Precautions:  Goal: Able to verbalize acceptance of life and situations over which he or she has no control  Description: Able to verbalize acceptance of life and situations over which he or she has no control  6/10/2020 2327 by Olga Stewart RN  Outcome: Ongoing  Note: Patient unable to verbalize acceptance over situations he has no control over. He continues to work on his discharge planning as he is still unsure of where he will go. Pleasant and cooperative this shift. Compliant with his scheduled hs medications. Will continue to monitor patient for safety and behavior. Safety maintained per unit policy, including q 42W patient safety checks. Problem: Depressive Behavior With or Without Suicide Precautions:  Goal: Absence of self-harm  Description: Absence of self-harm  6/10/2020 2327 by Olga Stewart RN  Outcome: Ongoing  Note: Patient remains free from harm. Patient spent most of the evening out in the day area social with his peers. Problem: Tobacco Use:  Goal: Inpatient tobacco use cessation counseling participation  Description: Inpatient tobacco use cessation counseling participation  6/10/2020 2327 by Olga Stewart RN  Outcome: Ongoing  Note: Patient given tobacco quitline number 27157672933 at this time, refusing to call at this time, states \" I just dont want to quit now\"- patient given information as to the dangers of long term tobacco use. Continue to reinforce the importance of tobacco cessation.
Problem: Pain:  Goal: Pain level will decrease  Description: Pain level will decrease  6/9/2020 0949 by Delphine Guillory LPN  Outcome: Ongoing   Patient has had no complaints of pain during the one on one time with the staff. Patient agrees to inform the staff if the pain returns or worsens. Problem: Depressive Behavior With or Without Suicide Precautions:  Goal: Absence of self-harm  Description: Absence of self-harm  6/9/2020 0949 by Delphine Guillory LPN  Outcome: Ongoing   Patient denies thoughts of self harm and is agreeable to seeking out staff should thoughts of self harm arise. Safe environment maintained. 15 minute checks for safety continued per unit policy. Will continue to monitor for safety and provide support and reassurance as needed. Problem: Tobacco Use:  Goal: Inpatient tobacco use cessation counseling participation  Description: Inpatient tobacco use cessation counseling participation  6/9/2020 0949 by Delphine Guillory LPN  Outcome: Ongoing   Patient states \" I just dont want to quit now\"- patient given information as to the dangers of long term tobacco use. Continue to reinforce the importance of tobacco cessation.
strategies for care    Method:group therapy, medication compliance, individualized assessments and care planning    Outcome: needs reinforcement    PATIENT GOALS: to be discussed with patient within 72 hours    PLAN/TREATMENT RECOMMENDATIONS:     continue group therapy , medications compliance, goal setting, individualized assessments and care, continue to monitor pt on unit      SHORT-TERM GOALS:   Time frame for Short-Term Goals: 5-7 days    LONG-TERM GOALS:  Time frame for Long-Term Goals: 6 months  Members Present in Team Meeting: See Signature Sheet    Dariusz Caballero
Post-Care Instructions: I reviewed with the patient in detail post-care instructions.TWICE A DAY –  Every morning and evening to allow for optimal healing:\\n1.  Wash hands with soap and water.\\n2. Cleanse the wound gently with soap and water.  This can be done in the shower. \\n3. Dry the area gently with a clean towel or gauze pad.\\n4. Apply a thin layer of petroleum jelly (Vaseline or Aquaphor) or \\nantibiotic ointment (Polysporin/Bacitracin) and cover with a bandage.\\n5. If sutures were placed, please have suture removed in _______days.\\n6. Repeat wound care twice daily until completely healed.\\Anayeli the skin heals, the biopsy site will have a dark pink-dark red appearance, often a light yellowish/cobblestone like film over the biopsy site and the biopsy site edges will be red.  If you site becomes increasingly red, inflamed, swollen, unusually tender, has drainage, or you have any questions/concerns, please call the office.

## 2020-06-15 NOTE — GROUP NOTE
Group Therapy Note    Date: 6/15/2020    Group Start Time: 1430  Group End Time: 1500  Group Topic: Psychoeducation    JULIUS Alatorre, [facility]        Group Therapy Note    Attendees: 8    Pt did not attend Therapeutic Recreation d/t resting in room despite staff invitation to attend. 1:1 talk time offered as alternative to group session, pt declined.

## 2020-06-15 NOTE — PROGRESS NOTES
Used   Substance and Sexual Activity    Alcohol use: Not Currently     Comment: 12 beers daily    Drug use: Not Currently     Types: Marijuana    Sexual activity: Not on file   Lifestyle    Physical activity     Days per week: Not on file     Minutes per session: Not on file    Stress: Not on file   Relationships    Social connections     Talks on phone: Not on file     Gets together: Not on file     Attends [employer] service: Not on file     Active member of club or organization: Not on file     Attends meetings of clubs or organizations: Not on file     Relationship status: Not on file    Intimate partner violence     Fear of current or ex partner: Not on file     Emotionally abused: Not on file     Physically abused: Not on file     Forced sexual activity: Not on file   Other Topics Concern    Not on file   Social History Narrative    Not on file           ROS:  [x] All negative/unchanged except if checked.  Explain positive(checked items) below:  [] Constitutional  [] Eyes  [] Ear/Nose/Mouth/Throat  [] Respiratory  [] CV  [] GI  []   [] Musculoskeletal  [] Skin/Breast  [] Neurological  [] Endocrine  [] Heme/Lymph  [] Allergic/Immunologic    Explanation:     MEDICATIONS:    Current Facility-Administered Medications:     levOCARNitine (CARNITOR) tablet 990 mg, 990 mg, Oral, BID, Fran Calderon MD, 990 mg at 06/15/20 1553    risperiDONE (RISPERDAL) tablet 2 mg, 2 mg, Oral, BID, John Esparza RN, NP, 2 mg at 06/15/20 0932    DULoxetine (CYMBALTA) extended release capsule 40 mg, 40 mg, Oral, Daily, Fran Calderon MD, 40 mg at 06/15/20 0932    acetaminophen (TYLENOL) tablet 650 mg, 650 mg, Oral, Q4H PRN, KODY Sweet - CNS, 650 mg at 06/14/20 2014    magnesium hydroxide (MILK OF MAGNESIA) 400 MG/5ML suspension 30 mL, 30 mL, Oral, Daily PRN, KODY Sweet    aluminum & magnesium hydroxide-simethicone (MAALOX) 200-200-20 MG/5ML suspension 30 mL, 30 mL, Oral, Q6H PRN, KODY Mosley - CNS    hydrOXYzine (ATARAX) tablet 25 mg, 25 mg, Oral, TID PRN, KODY Thibodeaux - CNS, 25 mg at 60/75/56 8776    folic acid (FOLVITE) tablet 1 mg, 1 mg, Oral, Daily, KODY Sweet - CNS, 1 mg at 06/15/20 0932    cetirizine (ZYRTEC) tablet 10 mg, 10 mg, Oral, Daily, KODY Sweet CNS, 10 mg at 06/15/20 0932    meloxicam (MOBIC) tablet 15 mg, 15 mg, Oral, Daily PRN, KODY Thibodeaux - CNS, 15 mg at 06/14/20 0907    metoprolol succinate (TOPROL XL) extended release tablet 25 mg, 25 mg, Oral, Daily, KODY Sweet - CNS, 25 mg at 06/15/20 7938    therapeutic multivitamin-minerals 1 tablet, 1 tablet, Oral, Daily, KODY Sweet CNS, 1 tablet at 06/15/20 0932    pantoprazole (PROTONIX) tablet 40 mg, 40 mg, Oral, Daily, KODY Sweet - CNS, 40 mg at 06/15/20 0932    vitamin B-1 (THIAMINE) tablet 100 mg, 100 mg, Oral, Daily, KODY Sweet, 100 mg at 06/15/20 0932    nicotine (NICODERM CQ) 14 MG/24HR 1 patch, 1 patch, Transdermal, Daily, Ana Yoder MD    traZODone (DESYREL) tablet 50 mg, 50 mg, Oral, Nightly PRN, Jovany KODY Song - CNP, 50 mg at 06/14/20 2107      Examination:  /60   Pulse 70   Temp 97.5 °F (36.4 °C) (Oral)   Resp 14   Ht 6' (1.829 m)   Wt 204 lb (92.5 kg)   SpO2 99%   BMI 27.67 kg/m²   Gait - steady  Medication side effects(SE): none    Mental Status Examination:    Level of consciousness:  within normal limits   Appearance:  poor grooming and poor hygiene  Behavior/Motor:  no abnormalities noted  Attitude toward examiner:  cooperative  Speech:  slow  Mood: anxious and depressed  Affect:  mood congruent  Thought processes:  coherent   Thought content:  Homicidal ideation - none  Suicidal Ideation:  denies suicidal ideation  Delusions:  paranoid  Perceptual Disturbance: deniesCognition:  oriented to person, place, and time   Concentration fair  Insight fair   Judgement fair     ASSESSMENT:   Patient symptoms are:  [] Well controlled  [x]

## 2020-06-16 PROCEDURE — 6370000000 HC RX 637 (ALT 250 FOR IP): Performed by: PSYCHIATRY & NEUROLOGY

## 2020-06-16 PROCEDURE — 6370000000 HC RX 637 (ALT 250 FOR IP): Performed by: NURSE PRACTITIONER

## 2020-06-16 PROCEDURE — 6370000000 HC RX 637 (ALT 250 FOR IP): Performed by: REGISTERED NURSE

## 2020-06-16 PROCEDURE — 1240000000 HC EMOTIONAL WELLNESS R&B

## 2020-06-16 PROCEDURE — 99232 SBSQ HOSP IP/OBS MODERATE 35: CPT | Performed by: PSYCHIATRY & NEUROLOGY

## 2020-06-16 RX ADMIN — ACETAMINOPHEN 650 MG: 325 TABLET, FILM COATED ORAL at 20:57

## 2020-06-16 RX ADMIN — MULTIPLE VITAMINS W/ MINERALS TAB 1 TABLET: TAB at 09:04

## 2020-06-16 RX ADMIN — TRAZODONE HYDROCHLORIDE 50 MG: 50 TABLET ORAL at 20:57

## 2020-06-16 RX ADMIN — LEVOCARNITINE 990 MG: 330 TABLET ORAL at 09:04

## 2020-06-16 RX ADMIN — METOPROLOL SUCCINATE 25 MG: 25 TABLET, EXTENDED RELEASE ORAL at 09:04

## 2020-06-16 RX ADMIN — FOLIC ACID 1 MG: 1 TABLET ORAL at 09:04

## 2020-06-16 RX ADMIN — PANTOPRAZOLE SODIUM 40 MG: 40 TABLET, DELAYED RELEASE ORAL at 09:04

## 2020-06-16 RX ADMIN — RISPERIDONE 2 MG: 2 TABLET ORAL at 20:57

## 2020-06-16 RX ADMIN — LEVOCARNITINE 990 MG: 330 TABLET ORAL at 20:57

## 2020-06-16 RX ADMIN — CETIRIZINE HYDROCHLORIDE 10 MG: 10 TABLET, FILM COATED ORAL at 09:04

## 2020-06-16 RX ADMIN — DULOXETINE 40 MG: 20 CAPSULE, DELAYED RELEASE ORAL at 09:04

## 2020-06-16 RX ADMIN — HYDROXYZINE HYDROCHLORIDE 25 MG: 25 TABLET, FILM COATED ORAL at 20:57

## 2020-06-16 RX ADMIN — THIAMINE HCL TAB 100 MG 100 MG: 100 TAB at 09:04

## 2020-06-16 RX ADMIN — RISPERIDONE 2 MG: 2 TABLET ORAL at 09:04

## 2020-06-16 ASSESSMENT — PAIN SCALES - GENERAL
PAINLEVEL_OUTOF10: 0
PAINLEVEL_OUTOF10: 0
PAINLEVEL_OUTOF10: 3

## 2020-06-16 NOTE — GROUP NOTE
Group Therapy Note    Date: 6/16/2020    Group Start Time: 1000  Group End Time: 2513  Group Topic: Psychotherapy    [facility]    Sarah Wooten        Group Therapy Note    Attendees: 11/21         Patient's Goal: PT will demonstrate increased interpersonal interaction and a clear understanding on multiple types of coping skills relating to the here-and-now therapeutic practice. Notes:  PT was an active lsitener during group discussion on this date. Status After Intervention:  Improved    Participation Level: Active Listener    Participation Quality: Appropriate and Attentive      Speech:  normal      Thought Process/Content: Logical      Affective Functioning: Flat      Mood: depressed      Level of consciousness:  Alert, Oriented x4 and Attentive      Response to Learning: Able to verbalize current knowledge/experience and Progressing to goal      Endings: None Reported    Modes of Intervention: Support, Socialization, Exploration, Clarifying and Problem-solving      Discipline Responsible: /Counselor      Signature:   Sarah Wooten

## 2020-06-16 NOTE — PROGRESS NOTES
[facility] FOLLOW-UP NOTE     6/16/2020     Patient was seen and examined in person, Chart reviewed   Patient's case discussed with staff/team    Chief Complaint: SI    Interim History:     - Accepted for admission to [facility] tomorrow. Mood continues to be low. He is imtiaz for safety. The patient asked appropriate questions about his ammonia levels and Depakote levels.   He is denying any auditory or visual hallucinations today    Appetite:   [x] Normal/Unchanged  [] Increased  [] Decreased      Sleep:       [] Normal/Unchanged  [x] Fair       [] Poor              Energy:    [x] Normal/Unchanged  [] Increased  [] Decreased        Aggression:  [] yes  [x] no    Patient is [x] able  [] unable to CONTRACT FOR SAFETY ON THE UNIT    PAST MEDICAL/PSYCHIATRIC HISTORY:   Past Medical History:   Diagnosis Date    Alcohol abuse     Anxiety     [facility]     COPD [facility]chronic obstructive pulmonary disease) [facility]     Depression     Head injury with loss of consciousness ([facility]     Headache     Hypertension     Liver disease     Migraine        FAMILY/SOCIAL HISTORY:  Family History   Problem Relation Age of Onset    High Blood Pressure Mother     Diabetes Mother      Social History     Socioeconomic History    Marital status: Single     Spouse name: Not on file    Number of children: Not on file    Years of education: Not on file    Highest education level: Not on file   Occupational History    Not on file   Social Needs    Financial resource strain: Not on file    Food insecurity     Worry: Not on file     Inability: Not on file    Transportation needs     Medical: Not on file     Non-medical: Not on file   Tobacco Use    Smoking status: Current Every Day Smoker     Packs/day: 2.00    Smokeless tobacco: Never Used   Substance and Sexual Activity    Alcohol use: Not Currently     Comment: 12 beers daily    Drug use: Not Currently     Types: Marijuana  Sexual activity: Not on file   Lifestyle    Physical activity     Days per week: Not on file     Minutes per session: Not on file    Stress: Not on file   Relationships    Social connections     Talks on phone: Not on file     Gets together: Not on file     Attends Yarsanism service: Not on file     Active member of club or organization: Not on file     Attends meetings of clubs or organizations: Not on file     Relationship status: Not on file    Intimate partner violence     Fear of current or ex partner: Not on file     Emotionally abused: Not on file     Physically abused: Not on file     Forced sexual activity: Not on file   Other Topics Concern    Not on file   Social History Narrative    Not on file           ROS:  [x] All negative/unchanged except if checked.  Explain positive(checked items) below:  [] Constitutional  [] Eyes  [] Ear/Nose/Mouth/Throat  [] Respiratory  [] CV  [] GI  []   [] Musculoskeletal  [] Skin/Breast  [] Neurological  [] Endocrine  [] Heme/Lymph  [] Allergic/Immunologic    Explanation:     MEDICATIONS:    Current Facility-Administered Medications:     levOCARNitine (CARNITOR) tablet 990 mg, 990 mg, Oral, BID, Megan Bennett MD, 990 mg at 06/16/20 0904    risperiDONE (RISPERDAL) tablet 2 mg, 2 mg, Oral, BID, Neno Carcamo RN, NP, 2 mg at 06/16/20 0743    DULoxetine (CYMBALTA) extended release capsule 40 mg, 40 mg, Oral, Daily, Megan Bennett MD, 40 mg at 06/16/20 7783    acetaminophen (TYLENOL) tablet 650 mg, 650 mg, Oral, Q4H PRN, KODY Scott, 650 mg at 06/14/20 2014    magnesium hydroxide (MILK OF MAGNESIA) 400 MG/5ML suspension 30 mL, 30 mL, Oral, Daily PRN, KODY Scott - CNS    aluminum & magnesium hydroxide-simethicone (MAALOX) 200-200-20 MG/5ML suspension 30 mL, 30 mL, Oral, Q6H PRN, KODY Sweet - CNS    hydrOXYzine (ATARAX) tablet 25 mg, 25 mg, Oral, TID PRN, KODY Scott - CNS, 25 mg at 97/68/02 9851    folic acid (FOLVITE) tablet

## 2020-06-17 VITALS
HEIGHT: 72 IN | OXYGEN SATURATION: 98 % | WEIGHT: 204 LBS | SYSTOLIC BLOOD PRESSURE: 88 MMHG | RESPIRATION RATE: 14 BRPM | TEMPERATURE: 97.4 F | DIASTOLIC BLOOD PRESSURE: 52 MMHG | BODY MASS INDEX: 27.63 KG/M2 | HEART RATE: 85 BPM

## 2020-06-17 PROCEDURE — 6370000000 HC RX 637 (ALT 250 FOR IP): Performed by: NURSE PRACTITIONER

## 2020-06-17 PROCEDURE — 6370000000 HC RX 637 (ALT 250 FOR IP): Performed by: REGISTERED NURSE

## 2020-06-17 PROCEDURE — 6370000000 HC RX 637 (ALT 250 FOR IP): Performed by: PSYCHIATRY & NEUROLOGY

## 2020-06-17 RX ADMIN — MULTIPLE VITAMINS W/ MINERALS TAB 1 TABLET: TAB at 08:21

## 2020-06-17 RX ADMIN — LEVOCARNITINE 990 MG: 330 TABLET ORAL at 08:21

## 2020-06-17 RX ADMIN — THIAMINE HCL TAB 100 MG 100 MG: 100 TAB at 08:21

## 2020-06-17 RX ADMIN — PANTOPRAZOLE SODIUM 40 MG: 40 TABLET, DELAYED RELEASE ORAL at 08:21

## 2020-06-17 RX ADMIN — CETIRIZINE HYDROCHLORIDE 10 MG: 10 TABLET, FILM COATED ORAL at 08:21

## 2020-06-17 RX ADMIN — DULOXETINE 40 MG: 20 CAPSULE, DELAYED RELEASE ORAL at 08:21

## 2020-06-17 RX ADMIN — RISPERIDONE 2 MG: 2 TABLET ORAL at 08:22

## 2020-06-17 RX ADMIN — FOLIC ACID 1 MG: 1 TABLET ORAL at 08:21

## 2020-06-17 NOTE — BH NOTE
1:1 and DISCHARGE PLAN:   - Writer attempts to meet with client multiple times but refuses to engage in conversation. Client laying in bed and does not make eye contact. - Writer continues to speak with client to let him know he needs to start working on his plan at discharge because it will be scheduled early next week. - Writer states he needs to get a hold of his sister in [place] or start calling inpatient AOD treatment facilities. Client is homeless and has a long history of not being compliant with in/outpatient treatment and has left treatment facilities prior to the required time, e.g., 30-treatment program.  - Writer also mentions to client he has been here for over 6 days and has not made an effort to work on his recovery and discharge. - Writer provides client with AOD folder and list of places to start calling.
1:1 interview was done via Telehealth by Ava Minor NP. States he has had some dizziness since he got up this morning. Discussed medications, increasing his fluid intake. Encouraged to attend groups.
Patient given tobacco quitline number 84451298865 at this time, refusing to call at this time, states \" I just dont want to quit now\"- patient given information as to the dangers of long term tobacco use. Continue to reinforce the importance of tobacco cessation.
Patient is alert, fully oriented, and cooperative. Appearance is disheveled, poor hygiene . Mood was depressed and in despair. Affect was blunted. Thought process was demonstrative of poverty of thought and thought blocking. Patient states he has auditory and visionary hallucinations.     Patient indicates prior suicidal ideations. Patient denied homicidal ideations . Patient's gross cognitive functions were impaired. Insight and judgement were poor. Both recent and remote memory were somewhat impaired. Patient indicates he is homeless but he has a job, does not want to go back into rehab but continues to drink after unsuccessful attempt at extended sobriety. He does have a daughter in [place] and would be interested in going to a facility near her but does not want to attend another treatment center.
Patient is complaining of anxiety at this time. Stating that they feel restless and are having trouble sleeping and claming down in order to rest this evening. Medication was given as prescribed for increased anxiety.
Patient participated in safety group.  Alisa Cardoza RN
Patient was seen by Dr. Nicolás Markham via Telehealth today.
Coping skills (new ways to manage stress, exercise, relaxation techniques, changing routine, distraction)                                                           (X )  Basic information about quitting (benefits of quitting, techniques in how to quit, available resources  ( ) Referral for counseling faxed to [facility]                                           ( ) Patient refused counseling  ( ) Patient has not smoked in the last 30 days    Metabolic Screening:    Lab Results   Component Value Date    LABA1C 4.9 10/11/2019       Lab Results   Component Value Date    CHOL 169 10/11/2019    CHOL 166 09/13/2019     Lab Results   Component Value Date    TRIG 56 10/11/2019    TRIG 84 09/13/2019     Lab Results   Component Value Date    HDL 56 12/04/2019    HDL 90 10/11/2019     09/13/2019     No components found for: LDLCAL  No results found for: LABVLDL      Body mass index is 27.67 kg/m². BP Readings from Last 2 Encounters:   06/08/20 (!) 168/87   06/08/20 130/87           Pt admitted with followings belongings:  Dentures: None  Vision - Corrective Lenses: None  Hearing Aid: None  Jewelry: Bracelet  Body Piercings Removed: N/A  Clothing: Shirt, Socks, Pants, Footwear  Were All Patient Medications Collected?: Not Applicable  Other Valuables: Cell phone, Money (Comment), Wallet     Valuables sent home with N/A. Valuables placed in safe in security envelope, number:  \[id number]. Patient's home medications need verified with patient pharmacy. Patient oriented to surroundings and program expectations and copy of patient rights given. Received admission packet:  Yes. Consents reviewed, signed Yes. Refused No. Patient verbalize understanding:  Yes. Patient education on precautions: Yes    Pt admitted to [facility]2 per provider order. Pt changed into hospital attire. Pt scanned with metal detector. All pt belongings checked for contraband and locked in utility closet.  Pt attempted to hide a lighter

## 2020-06-17 NOTE — DISCHARGE SUMMARY
DISCHARGE SUMMARY      Patient ID:  Rafia De La Torre  [id number]  89 y.o.  1970    Admit date: 6/8/2020    Discharge date and time: 6/17/2020    Disposition: Home     Admitting Physician: Andrae Mascorro MD     Discharge Physician: Dr Myrtle Gibbs MD    Admission Diagnoses: Major depressive disorder, recurrent ([facility] [F33.9]    Admission Condition: poor    Discharged Condition: stable    Admission Circumstance: The patient was admitted to psychiatry after presenting to ER with suicidal ideation with thoughts of walking into traffic.   The patient also reported auditory and visual hallucinations      PAST MEDICAL/PSYCHIATRIC HISTORY:   Past Medical History:   Diagnosis Date    Alcohol abuse     Anxiety     COPD (chronic obstructive pulmonary disease) (HCC)     COPD (chronic obstructive pulmonary disease) (HCC)     Depression     Head injury with loss of consciousness ([facility]     Headache     Hypertension     Liver disease     Migraine        FAMILY/SOCIAL HISTORY:  Family History   Problem Relation Age of Onset    High Blood Pressure Mother     Diabetes Mother      Social History     Socioeconomic History    Marital status: Single     Spouse name: Not on file    Number of children: Not on file    Years of education: Not on file    Highest education level: Not on file   Occupational History    Not on file   Social Needs    Financial resource strain: Not on file    Food insecurity     Worry: Not on file     Inability: Not on file    Transportation needs     Medical: Not on file     Non-medical: Not on file   Tobacco Use    Smoking status: Current Every Day Smoker     Packs/day: 2.00    Smokeless tobacco: Never Used   Substance and Sexual Activity    Alcohol use: Not Currently     Comment: 12 beers daily    Drug use: Not Currently     Types: Marijuana    Sexual activity: Not on file   Lifestyle    Physical activity     Days per week: Not on file     Minutes per session: Not on file    Stress: Not on file   Relationships    Social connections     Talks on phone: Not on file     Gets together: Not on file     Attends Zoroastrian service: Not on file     Active member of club or organization: Not on file     Attends meetings of clubs or organizations: Not on file     Relationship status: Not on file    Intimate partner violence     Fear of current or ex partner: Not on file     Emotionally abused: Not on file     Physically abused: Not on file     Forced sexual activity: Not on file   Other Topics Concern    Not on file   Social History Narrative    Not on file       MEDICATIONS:  No current facility-administered medications for this encounter.      Current Outpatient Medications:     DULoxetine 40 MG CPEP, Take 40 mg by mouth daily, Disp: 30 capsule, Rfl: 3    traZODone (DESYREL) 50 MG tablet, Take 1 tablet by mouth nightly as needed for Sleep, Disp: 30 tablet, Rfl: 0    risperiDONE (RISPERDAL) 2 MG tablet, Take 1 tablet by mouth 2 times daily, Disp: 60 tablet, Rfl: 3    metoprolol succinate (TOPROL XL) 25 MG extended release tablet, Take 1 tablet by mouth daily, Disp: 30 tablet, Rfl: 3    folic acid (FOLVITE) 1 MG tablet, Take 1 tablet by mouth daily, Disp: 30 tablet, Rfl: 5    pantoprazole (PROTONIX) 40 MG tablet, Take 1 tablet by mouth daily, Disp: 30 tablet, Rfl: 3    vitamin B-1 (THIAMINE) 100 MG tablet, Take 1 tablet by mouth daily, Disp: 30 tablet, Rfl: 5    loratadine (CLARITIN) 10 MG tablet, Take 1 tablet by mouth daily, Disp: 30 tablet, Rfl: 0    meloxicam (MOBIC) 15 MG tablet, Take 1 tablet by mouth daily as needed for Pain (Patient taking differently: Take 15 mg by mouth 2 times daily ), Disp: 30 tablet, Rfl: 0    hydrOXYzine (ATARAX) 25 MG tablet, Take 25 mg by mouth 3 times daily as needed for Itching, Disp: , Rfl:     acetaminophen (TYLENOL) 500 MG tablet, Take 1 tablet by mouth 4 times daily as needed for Pain, Disp: 60 tablet, Rfl: 0    Multiple Vitamins-Minerals (THERAPEUTIC

## 2020-06-17 NOTE — CARE COORDINATION
Name: Crys Choe    : 1970    Discharge Date: 2020    Primary Auth/Cert #: [id number]    Discharge Medications:      Medication List      START taking these medications    risperiDONE 2 MG tablet  Commonly known as:  RISPERDAL  Take 1 tablet by mouth 2 times daily  Notes to patient:  Anxiety/depression        CHANGE how you take these medications    DULoxetine HCl 40 MG Cpep  Take 40 mg by mouth daily  What changed:    · medication strength  · how much to take  Notes to patient:  Depression/anxiety     meloxicam 15 MG tablet  Commonly known as:  MOBIC  Take 1 tablet by mouth daily as needed for Pain  What changed:  when to take this  Notes to patient:  pain     metoprolol succinate 25 MG extended release tablet  Commonly known as:   Toprol XL  Take 1 tablet by mouth daily  What changed:  additional instructions  Notes to patient:  Blood pressure     traZODone 50 MG tablet  Commonly known as:  DESYREL  Take 1 tablet by mouth nightly as needed for Sleep  What changed:    · medication strength  · how much to take  Notes to patient:  sleep        CONTINUE taking these medications    acetaminophen 500 MG tablet  Commonly known as:  TYLENOL  Take 1 tablet by mouth 4 times daily as needed for Pain  Notes to patient:  pain     folic acid 1 MG tablet  Commonly known as:  FOLVITE  Take 1 tablet by mouth daily  Notes to patient:  vitamin     hydrOXYzine 25 MG tablet  Commonly known as:  ATARAX  Notes to patient:  anxiety     loratadine 10 MG tablet  Commonly known as:  Claritin  Take 1 tablet by mouth daily  Notes to patient:  allergies     pantoprazole 40 MG tablet  Commonly known as:  PROTONIX  Take 1 tablet by mouth daily  Notes to patient:  Treats acid reflux      therapeutic multivitamin-minerals tablet  Take 1 tablet by mouth daily OTC with iron  Notes to patient:  vitamin     vitamin B-1 100 MG tablet  Commonly known as:  THIAMINE  Take 1 tablet by mouth daily  Notes to patient:  vitamin        STOP

## 2021-02-14 ENCOUNTER — HOSPITAL ENCOUNTER (EMERGENCY)
Age: 51
Discharge: HOME | End: 2021-02-14
Attending: EMERGENCY MEDICINE
Payer: COMMERCIAL

## 2021-02-14 ENCOUNTER — APPOINTMENT (OUTPATIENT)
Dept: GENERAL RADIOLOGY | Age: 51
End: 2021-02-14
Payer: COMMERCIAL

## 2021-02-14 VITALS
HEIGHT: 72 IN | BODY MASS INDEX: 29.8 KG/M2 | WEIGHT: 220 LBS | OXYGEN SATURATION: 98 % | RESPIRATION RATE: 18 BRPM | HEART RATE: 75 BPM | TEMPERATURE: 98.2 F | SYSTOLIC BLOOD PRESSURE: 117 MMHG | DIASTOLIC BLOOD PRESSURE: 105 MMHG

## 2021-02-14 DIAGNOSIS — R07.89 CHEST WALL PAIN: Primary | ICD-10-CM

## 2021-02-14 DIAGNOSIS — J02.9 VIRAL PHARYNGITIS: ICD-10-CM

## 2021-02-14 LAB
ABSOLUTE EOS #: 1.1 K/UL (ref 0–0.44)
ABSOLUTE IMMATURE GRANULOCYTE: <0.03 K/UL (ref 0–0.3)
ABSOLUTE LYMPH #: 1.5 K/UL (ref 1.1–3.7)
ABSOLUTE MONO #: 0.85 K/UL (ref 0.1–1.2)
ANION GAP SERPL CALCULATED.3IONS-SCNC: 11 MMOL/L (ref 9–17)
BASOPHILS # BLD: 2 % (ref 0–2)
BASOPHILS ABSOLUTE: 0.15 K/UL (ref 0–0.2)
BUN BLDV-MCNC: 12 MG/DL (ref 6–20)
BUN/CREAT BLD: NORMAL (ref 9–20)
CALCIUM SERPL-MCNC: 9.4 MG/DL (ref 8.6–10.4)
CHLORIDE BLD-SCNC: 99 MMOL/L (ref 98–107)
CO2: 26 MMOL/L (ref 20–31)
CREAT SERPL-MCNC: 0.75 MG/DL (ref 0.7–1.2)
DIFFERENTIAL TYPE: ABNORMAL
EOSINOPHILS RELATIVE PERCENT: 12 % (ref 1–4)
GFR AFRICAN AMERICAN: >60 ML/MIN
GFR NON-AFRICAN AMERICAN: >60 ML/MIN
GFR SERPL CREATININE-BSD FRML MDRD: NORMAL ML/MIN/{1.73_M2}
GFR SERPL CREATININE-BSD FRML MDRD: NORMAL ML/MIN/{1.73_M2}
GLUCOSE BLD-MCNC: 79 MG/DL (ref 70–99)
HCT VFR BLD CALC: 45.4 % (ref 40.7–50.3)
HEMOGLOBIN: 15.1 G/DL (ref 13–17)
IMMATURE GRANULOCYTES: 0 %
LYMPHOCYTES # BLD: 17 % (ref 24–43)
MCH RBC QN AUTO: 32.9 PG (ref 25.2–33.5)
MCHC RBC AUTO-ENTMCNC: 33.3 G/DL (ref 28.4–34.8)
MCV RBC AUTO: 98.9 FL (ref 82.6–102.9)
MONOCYTES # BLD: 10 % (ref 3–12)
NRBC AUTOMATED: 0 PER 100 WBC
PDW BLD-RTO: 13.2 % (ref 11.8–14.4)
PLATELET # BLD: 158 K/UL (ref 138–453)
PLATELET ESTIMATE: ABNORMAL
PMV BLD AUTO: 11.9 FL (ref 8.1–13.5)
POTASSIUM SERPL-SCNC: 3.8 MMOL/L (ref 3.7–5.3)
RBC # BLD: 4.59 M/UL (ref 4.21–5.77)
RBC # BLD: ABNORMAL 10*6/UL
SEG NEUTROPHILS: 59 % (ref 36–65)
SEGMENTED NEUTROPHILS ABSOLUTE COUNT: 5.33 K/UL (ref 1.5–8.1)
SODIUM BLD-SCNC: 136 MMOL/L (ref 135–144)
TROPONIN INTERP: NORMAL
TROPONIN T: NORMAL NG/ML
TROPONIN, HIGH SENSITIVITY: <6 NG/L (ref 0–22)
WBC # BLD: 9 K/UL (ref 3.5–11.3)
WBC # BLD: ABNORMAL 10*3/UL

## 2021-02-14 PROCEDURE — 71045 X-RAY EXAM CHEST 1 VIEW: CPT

## 2021-02-14 PROCEDURE — 84484 ASSAY OF TROPONIN QUANT: CPT

## 2021-02-14 PROCEDURE — 80048 BASIC METABOLIC PNL TOTAL CA: CPT

## 2021-02-14 PROCEDURE — 2580000003 HC RX 258: Performed by: STUDENT IN AN ORGANIZED HEALTH CARE EDUCATION/TRAINING PROGRAM

## 2021-02-14 PROCEDURE — 99284 EMERGENCY DEPT VISIT MOD MDM: CPT

## 2021-02-14 PROCEDURE — 6370000000 HC RX 637 (ALT 250 FOR IP): Performed by: STUDENT IN AN ORGANIZED HEALTH CARE EDUCATION/TRAINING PROGRAM

## 2021-02-14 PROCEDURE — 85025 COMPLETE CBC W/AUTO DIFF WBC: CPT

## 2021-02-14 PROCEDURE — 93005 ELECTROCARDIOGRAM TRACING: CPT | Performed by: STUDENT IN AN ORGANIZED HEALTH CARE EDUCATION/TRAINING PROGRAM

## 2021-02-14 RX ORDER — ACETAMINOPHEN 500 MG
1000 TABLET ORAL ONCE
Status: COMPLETED | OUTPATIENT
Start: 2021-02-14 | End: 2021-02-14

## 2021-02-14 RX ORDER — LIDOCAINE 4 G/G
1 PATCH TOPICAL ONCE
Status: DISCONTINUED | OUTPATIENT
Start: 2021-02-14 | End: 2021-02-14 | Stop reason: HOSPADM

## 2021-02-14 RX ORDER — 0.9 % SODIUM CHLORIDE 0.9 %
1000 INTRAVENOUS SOLUTION INTRAVENOUS ONCE
Status: COMPLETED | OUTPATIENT
Start: 2021-02-14 | End: 2021-02-14

## 2021-02-14 RX ORDER — IBUPROFEN 600 MG/1
600 TABLET ORAL EVERY 6 HOURS PRN
Qty: 60 TABLET | Refills: 0 | Status: SHIPPED | OUTPATIENT
Start: 2021-02-14 | End: 2021-04-07

## 2021-02-14 RX ORDER — LIDOCAINE 50 MG/G
1 PATCH TOPICAL DAILY
Qty: 10 PATCH | Refills: 0 | Status: ON HOLD | OUTPATIENT
Start: 2021-02-14 | End: 2021-03-17 | Stop reason: HOSPADM

## 2021-02-14 RX ORDER — ASPIRIN 81 MG/1
324 TABLET, CHEWABLE ORAL ONCE
Status: COMPLETED | OUTPATIENT
Start: 2021-02-14 | End: 2021-02-14

## 2021-02-14 RX ORDER — ACETAMINOPHEN 325 MG/1
650 TABLET ORAL EVERY 6 HOURS PRN
Qty: 60 TABLET | Refills: 0 | Status: SHIPPED | OUTPATIENT
Start: 2021-02-14 | End: 2021-04-08

## 2021-02-14 RX ADMIN — ACETAMINOPHEN 1000 MG: 500 TABLET ORAL at 14:59

## 2021-02-14 RX ADMIN — SODIUM CHLORIDE 1000 ML: 9 INJECTION, SOLUTION INTRAVENOUS at 14:59

## 2021-02-14 RX ADMIN — ASPIRIN 324 MG: 81 TABLET, CHEWABLE ORAL at 15:00

## 2021-02-14 ASSESSMENT — ENCOUNTER SYMPTOMS
CHEST TIGHTNESS: 0
DIARRHEA: 0
NAUSEA: 0
TROUBLE SWALLOWING: 0
SORE THROAT: 1
VOMITING: 0
BACK PAIN: 0
COUGH: 0
ABDOMINAL PAIN: 0
WHEEZING: 0
VOICE CHANGE: 0
CONSTIPATION: 0
SHORTNESS OF BREATH: 0
PHOTOPHOBIA: 0

## 2021-02-14 ASSESSMENT — PAIN SCALES - GENERAL: PAINLEVEL_OUTOF10: 8

## 2021-02-14 ASSESSMENT — HEART SCORE: ECG: 0

## 2021-02-14 NOTE — ED PROVIDER NOTES
[facility]  ED  Emergency Department Encounter  Emergency Medicine Resident     Pt Name: Yessenia Drew  MRN: [id number]  [place] 1970  Date of evaluation: 2/14/21  PCP:  Vahid Royal MD    [address] [place] [address]       Chief Complaint   Patient presents with    Chest Pain    Pharyngitis       HISTORY OFPRESENT ILLNESS  (Location/Symptom, Timing/Onset, Context/Setting, Quality, Duration, Modifying Factors,Severity.)      Yessenia Drew is a 44 y.o.  male who presents with chest pain. Patient states this has been ongoing for 3 days. Patient said he recently started a new job which started this. He also has a chronic cough. Mild sore throat in addition to his chest pain. The chest pain is right-sided, reproducible, intermittently sharp depending on how he moves. He does have a little bit of pain when he takes a deep breath on the right chest wall where there is tenderness to palpation. PAST MEDICAL / SURGICAL / SOCIAL / FAMILY HISTORY      has a past medical history of Alcohol abuse, Anxiety, COPD (chronic obstructive pulmonary disease) ([address] COPD (chronic obstructive pulmonary disease) ([address] Depression, Head injury with loss of consciousness ([address] Headache, Hypertension, Liver disease, and Migraine. has a past surgical history that includes Ankle surgery (age 10) and Upper gastrointestinal endoscopy (N/A, 9/17/2019).      Social History     Socioeconomic History    Marital status: Single     Spouse name: Not on file    Number of children: Not on file    Years of education: Not on file    Highest education level: Not on file   Occupational History    Not on file   Social Needs    Financial resource strain: Not on file    Food insecurity     Worry: Not on file     Inability: Not on file    Transportation needs     Medical: Not on file     Non-medical: Not on file   Tobacco Use    Smoking status: Current Every Day Smoker     Packs/day: 2.00    Smokeless tobacco: Never Used Substance and Sexual Activity    Alcohol use: Not Currently     Comment: 12 beers daily    Drug use: Not Currently     Types: Marijuana    Sexual activity: Not on file   Lifestyle    Physical activity     Days per week: Not on file     Minutes per session: Not on file    Stress: Not on file   Relationships    Social connections     Talks on phone: Not on file     Gets together: Not on file     Attends Oriental orthodox service: Not on file     Active member of club or organization: Not on file     Attends meetings of clubs or organizations: Not on file     Relationship status: Not on file    Intimate partner violence     Fear of current or ex partner: Not on file     Emotionally abused: Not on file     Physically abused: Not on file     Forced sexual activity: Not on file   Other Topics Concern    Not on file   Social History Narrative    Not on file       Family History   Problem Relation Age of Onset    High Blood Pressure Mother     Diabetes Mother         Allergies:  Patient has no known allergies. Home Medications:  Prior to Admission medications    Medication Sig Start Date End Date Taking?  Authorizing Provider   acetaminophen (TYLENOL) 325 MG tablet Take 2 tablets by mouth every 6 hours as needed for Pain 2/14/21  Yes Dereje Reyes DO   ibuprofen (IBU) 600 MG tablet Take 1 tablet by mouth every 6 hours as needed for Pain 2/14/21  Yes Julio Reyes DO   lidocaine (LIDODERM) 5 % Place 1 patch onto the skin daily 12 hours on, 12 hours off. 2/14/21  Yes Julio Reyes DO   benzocaine-menthol (CEPACOL) 15-4 MG LOZG lozenge Take 1 lozenge by mouth every 2 hours as needed for Sore Throat 2/14/21  Yes Claudia Reyes DO   DULoxetine 40 MG CPEP Take 40 mg by mouth daily 6/16/20   Lucia Costa MD   traZODone (DESYREL) 50 MG tablet Take 1 tablet by mouth nightly as needed for Sleep 6/15/20   Lucia Costa MD   risperiDONE (RISPERDAL) 2 MG tablet Take 1 tablet by mouth 2 times daily 6/15/20   Zander Mathew MD   metoprolol succinate (TOPROL XL) 25 MG extended release tablet Take 1 tablet by mouth daily 6/16/20   Zander Mathew MD   folic acid (FOLVITE) 1 MG tablet Take 1 tablet by mouth daily 6/15/20   Zander Mathew MD   pantoprazole (PROTONIX) 40 MG tablet Take 1 tablet by mouth daily 6/16/20   Zander Mathew MD   vitamin B-1 (THIAMINE) 100 MG tablet Take 1 tablet by mouth daily 6/15/20   Zander Mathew MD   loratadine (CLARITIN) 10 MG tablet Take 1 tablet by mouth daily 5/4/20   Lachelle Haley MD   meloxicam (MOBIC) 15 MG tablet Take 1 tablet by mouth daily as needed for Pain  Patient taking differently: Take 15 mg by mouth 2 times daily  5/4/20   Lachelle Haley MD   hydrOXYzine (ATARAX) 25 MG tablet Take 25 mg by mouth 3 times daily as needed for Itching    Historical Provider, MD   Multiple Vitamins-Minerals (THERAPEUTIC MULTIVITAMIN-MINERALS) tablet Take 1 tablet by mouth daily OTC with iron 11/27/19   Celeste Casiano, APRN - CNP       REVIEW OFSYSTEMS    (2-9 systems for level 4, 10 or more for level 5)      Review of Systems   Constitutional: Negative for chills, diaphoresis, fatigue and fever. HENT: Positive for sore throat. Negative for congestion, trouble swallowing and voice change. Eyes: Negative for photophobia and visual disturbance. Respiratory: Negative for cough, chest tightness, shortness of breath and wheezing. Cardiovascular: Negative for palpitations and leg swelling. Gastrointestinal: Negative for abdominal pain, constipation, diarrhea, nausea and vomiting. Endocrine: Negative for polydipsia, polyphagia and polyuria. Genitourinary: Negative for difficulty urinating, dysuria, flank pain and hematuria. Musculoskeletal: Negative for arthralgias, back pain, neck pain and neck stiffness. Neurological: Positive for headaches. Negative for dizziness, weakness, light-headedness and numbness.    Psychiatric/Behavioral: Negative for agitation and behavioral problems. PHYSICAL EXAM   (up to 7 for level 4, 8 or more forlevel 5)      INITIAL VITALS:   ED Triage Vitals [02/14/21 1434]   BP Temp Temp Source Pulse Resp SpO2 Height Weight   143/84 98.2 °F (36.8 °C) Skin 77 18 99 -- --       Physical Exam  Constitutional:       General: He is not in acute distress. Appearance: He is well-developed. He is not diaphoretic. HENT:      Head: Normocephalic and atraumatic. Nose: No congestion or rhinorrhea. Mouth/Throat:      Mouth: Mucous membranes are moist. No oral lesions. Pharynx: Oropharynx is clear. No posterior oropharyngeal erythema. Tonsils: No tonsillar exudate or tonsillar abscesses. Eyes:      Conjunctiva/sclera: Conjunctivae normal.      Pupils: Pupils are equal, round, and reactive to light. Neck:      Musculoskeletal: Normal range of motion and neck supple. Vascular: No JVD. Trachea: No tracheal deviation. Cardiovascular:      Rate and Rhythm: Normal rate and regular rhythm. Heart sounds: Normal heart sounds. No friction rub. Comments: Radial and PT pulses equal bilaterally  Pulmonary:      Effort: Pulmonary effort is normal. No respiratory distress. Breath sounds: Normal breath sounds. No wheezing or rales. Chest:      Chest wall: No tenderness. Abdominal:      General: Bowel sounds are normal. There is no distension. Palpations: Abdomen is soft. Tenderness: There is no abdominal tenderness. There is no guarding. Skin:     General: Skin is warm and dry. Capillary Refill: Capillary refill takes less than 2 seconds. Coloration: Skin is not pale. Findings: No erythema or rash. Neurological:      General: No focal deficit present. Mental Status: He is alert and oriented to person, place, and time. Cranial Nerves: No cranial nerve deficit.    Psychiatric:         Mood and Affect: Mood normal.         Behavior: Behavior normal.         DIFFERENTIAL  DIAGNOSIS     PLAN 2/14/2021  EXAMINATION: ONE XRAY VIEW OF THE CHEST 2/14/2021 2:55 pm COMPARISON: Chest June 6, 2020. HISTORY: ORDERING SYSTEM PROVIDED HISTORY: chest pain TECHNOLOGIST PROVIDED HISTORY: chest pain Reason for Exam: cp Acuity: Acute Type of Exam: Initial FINDINGS: Heart appears normal size. Lungs are clear. No free air. No acute process. EKG  EKG Interpretation    Interpreted by emergency department physician    Rhythm: normal sinus   Rate: normal  Axis: normal  Ectopy: none  Conduction: normal  ST Segments: no acute change  T Waves: no acute change  Q Waves: none    Clinical Impression: non-specific EKG    James Campbellnbaum    All EKG's are interpreted by the Emergency Department Physicianwho either signs or Co-signs this chart in the absence of a cardiologist.    EMERGENCY DEPARTMENT COURSE:  ED Course as of Feb 14 1534   Sun Feb 14, 2021   1525 Trop <6, will discharge with symptomatic management for viral pharyngitis and musculoskeletal chest wall pain. Troponin, High Sensitivity: <6 [JG]   1527 Heart Score 2, will discharge with follow up. [JG]   1529 BMP unremarkable    [JG]      ED Course User Index  [JG] Homa Bravo DO          PROCEDURES:  None    CONSULTS:  None    CRITICAL CARE:  Please see attending note    FINAL IMPRESSION      1. Chest wall pain    2. Viral pharyngitis          DISPOSITION / PLAN     DISPOSITION Decision To Discharge 02/14/2021 03:28:20 PM      PATIENT REFERRED TO:  Christiano Emmanuel MD  [address]   [address]  [address]  [phone]    Go in 3 days      [facility]  [address] [facility] [address]  [phone]  Go to   If symptoms worsen      DISCHARGE MEDICATIONS:  New Prescriptions    ACETAMINOPHEN (TYLENOL) 325 MG TABLET    Take 2 tablets by mouth every 6 hours as needed for Pain    BENZOCAINE-MENTHOL (CEPACOL) 15-4 MG LOZG LOZENGE    Take 1 lozenge by mouth every 2 hours as needed for Sore Throat    IBUPROFEN (IBU) 600 MG TABLET Take 1 tablet by mouth every 6 hours as needed for Pain    LIDOCAINE (LIDODERM) 5 %    Place 1 patch onto the skin daily 12 hours on, 12 hours off.        Hassan Litten, DO  Emergency Medicine Resident    (Please note that portions of this note were completed with a voice recognition program.Efforts were made to edit the dictations but occasionally words are mis-transcribed.)     Hassan Litten, DO  Resident  02/14/21 [address] [place] Stephanie Palomares,   Resident  02/14/21 [address] [facility] Stephanie Palomares,   Resident  02/14/21 1534

## 2021-02-14 NOTE — ED PROVIDER NOTES
[facility] ED  Emergency Department  Emergency Medicine Resident Sign-out     Care of Aliyah Lai was assumed from Dr. Lorena Jovel and is being seen for Chest Pain and Pharyngitis  . The patient's initial evaluation and plan have been discussed with the prior provider who initially evaluated the patient. EMERGENCY DEPARTMENT COURSE / MEDICAL DECISION MAKING:       MEDICATIONS GIVEN:  Orders Placed This Encounter   Medications    0.9 % sodium chloride bolus    aspirin chewable tablet 324 mg    lidocaine 4 % external patch 1 patch    acetaminophen (TYLENOL) tablet 1,000 mg    acetaminophen (TYLENOL) 325 MG tablet     Sig: Take 2 tablets by mouth every 6 hours as needed for Pain     Dispense:  60 tablet     Refill:  0    ibuprofen (IBU) 600 MG tablet     Sig: Take 1 tablet by mouth every 6 hours as needed for Pain     Dispense:  60 tablet     Refill:  0    lidocaine (LIDODERM) 5 %     Sig: Place 1 patch onto the skin daily 12 hours on, 12 hours off. Dispense:  10 patch     Refill:  0    benzocaine-menthol (CEPACOL) 15-4 MG LOZG lozenge     Sig: Take 1 lozenge by mouth every 2 hours as needed for Sore Throat     Dispense:  168 lozenge     Refill:  0       LABS / RADIOLOGY:     Labs Reviewed   CBC WITH AUTO DIFFERENTIAL - Abnormal; Notable for the following components:       Result Value    Lymphocytes 17 (*)     Eosinophils % 12 (*)     Absolute Eos # 1.10 (*)     All other components within normal limits   BASIC METABOLIC PANEL W/ REFLEX TO MG FOR LOW K   TROPONIN       Xr Chest Portable    Result Date: 2/14/2021  EXAMINATION: ONE XRAY VIEW OF THE CHEST 2/14/2021 2:55 pm COMPARISON: Chest June 6, 2020. HISTORY: ORDERING SYSTEM PROVIDED HISTORY: chest pain TECHNOLOGIST PROVIDED HISTORY: chest pain Reason for Exam: cp Acuity: Acute Type of Exam: Initial FINDINGS: Heart appears normal size. Lungs are clear. No free air. No acute process.        RECENT VITALS:     Temp: 98.2 °F (36.8 °C),  Pulse: 75, Resp: 18, BP: (!) 117/105, SpO2: 98 %      This patient is a 44 y.o. Male with MSK CP. Workup here negative. Is discharged, awaiting to physically leave department. ED Course as of Feb 14 1548   Leigh Mandujnao Feb 14, 2021   1525 Trop <6, will discharge with symptomatic management for viral pharyngitis and musculoskeletal chest wall pain. Troponin, High Sensitivity: <6 [JG]   1527 Heart Score 2, will discharge with follow up. [JG]   1529 BMP unremarkable    [JG]      ED Course User Index  [JG] Job Ethan Reyes,        OUTSTANDING TASKS / RECOMMENDATIONS:    1. Awaiting discharge     FINAL IMPRESSION:     1. Chest wall pain    2. Viral pharyngitis        DISPOSITION:         DISPOSITION:  [x]  Discharge   []  Transfer -    []  Admission -     []  Against Medical Advice   []  Eloped   FOLLOW-UP: Sara Berry MD  [address] [place] [id number]  [phone]    Go in 3 days      [facility]  [address] [facility] [address] [phone]  Go to   If symptoms worsen     DISCHARGE MEDICATIONS: New Prescriptions    ACETAMINOPHEN (TYLENOL) 325 MG TABLET    Take 2 tablets by mouth every 6 hours as needed for Pain    BENZOCAINE-MENTHOL (CEPACOL) 15-4 MG LOZG LOZENGE    Take 1 lozenge by mouth every 2 hours as needed for Sore Throat    IBUPROFEN (IBU) 600 MG TABLET    Take 1 tablet by mouth every 6 hours as needed for Pain    LIDOCAINE (LIDODERM) 5 %    Place 1 patch onto the skin daily 12 hours on, 12 hours off.           Favian Goncalves DO  Emergency Medicine Resident  [facility]      Favian Goncalves [place]  Resident  02/14/21 1221

## 2021-02-14 NOTE — ED NOTES
Pt placed on cardiac monitor, BP cuff, and pulse ox. Alarms set.       Sheri Kumar RN  02/14/21 4070

## 2021-02-14 NOTE — ED NOTES
Pt to ed with c/o chest pain, headache, sore throat ongoing for weeks. Pt states he started a new job about 3 weeks ago, does a lot of bending, heavy lifting, pt states he initially thought he may have pulled a muscle but 3 weeks later and he still has pain. Pt states over the past week the pain has been constant not intermittent like is has been, radiates into his back and worse with deep breath. Pt states he has chronic headache, ongoing for 6 months plus, does not take medications at home, also c/o sore throat for weeks, painful to swallow, worse on the left side. Pt is alert, oriented, speaking in full, complete sentences. Pt states he stopped taking all of his home medications months ago, at least 4 months ago as it was complicated to obtain prescriptions at times. Pt is alert, oriented, speaking in full, complete sentences.  Pt rates pain [address], RN  02/14/21 4585

## 2021-02-14 NOTE — ED NOTES
Pt ambulated to restroom, steady gait without obvious distress noted.       Aditi De Guzman RN  02/14/21 6783

## 2021-02-14 NOTE — ED PROVIDER NOTES
pain equal pulses no calf tenderness no edema no asymmetry. Will check labs UA chest x-ray reevaluate need for observation stay.     EKG interpretation sinus rhythm 76 normal intervals normal axis no acute ST or T changes normal EKG        Critical Care     none    Kvng Prieto MD, Bozena Jin  Attending Emergency  Physician             Kvng Prieto MD  02/14/21 1335

## 2021-02-15 LAB
EKG ATRIAL RATE: 76 BPM
EKG P AXIS: 58 DEGREES
EKG P-R INTERVAL: 130 MS
EKG Q-T INTERVAL: 356 MS
EKG QRS DURATION: 82 MS
EKG QTC CALCULATION (BAZETT): 400 MS
EKG R AXIS: 50 DEGREES
EKG T AXIS: 60 DEGREES
EKG VENTRICULAR RATE: 76 BPM

## 2021-02-15 PROCEDURE — 93010 ELECTROCARDIOGRAM REPORT: CPT | Performed by: INTERNAL MEDICINE

## 2021-02-27 ENCOUNTER — HOSPITAL ENCOUNTER (EMERGENCY)
Age: 51
Discharge: HOME | End: 2021-02-28
Attending: EMERGENCY MEDICINE
Payer: COMMERCIAL

## 2021-02-27 VITALS
DIASTOLIC BLOOD PRESSURE: 61 MMHG | SYSTOLIC BLOOD PRESSURE: 110 MMHG | TEMPERATURE: 97 F | RESPIRATION RATE: 16 BRPM | OXYGEN SATURATION: 96 % | HEART RATE: 90 BPM

## 2021-02-27 DIAGNOSIS — F10.920 ACUTE ALCOHOLIC INTOXICATION WITHOUT COMPLICATION (HCC): ICD-10-CM

## 2021-02-27 DIAGNOSIS — R45.851 SUICIDAL IDEATION: Primary | ICD-10-CM

## 2021-02-27 LAB
ABSOLUTE EOS #: 0.71 K/UL (ref 0–0.44)
ABSOLUTE IMMATURE GRANULOCYTE: <0.03 K/UL (ref 0–0.3)
ABSOLUTE LYMPH #: 1.86 K/UL (ref 1.1–3.7)
ABSOLUTE MONO #: 0.64 K/UL (ref 0.1–1.2)
ACETAMINOPHEN LEVEL: <5 UG/ML (ref 10–30)
ANION GAP SERPL CALCULATED.3IONS-SCNC: 13 MMOL/L (ref 9–17)
BASOPHILS # BLD: 2 % (ref 0–2)
BASOPHILS ABSOLUTE: 0.15 K/UL (ref 0–0.2)
BUN BLDV-MCNC: 6 MG/DL (ref 6–20)
BUN/CREAT BLD: NORMAL (ref 9–20)
CALCIUM SERPL-MCNC: 8.9 MG/DL (ref 8.6–10.4)
CHLORIDE BLD-SCNC: 103 MMOL/L (ref 98–107)
CO2: 23 MMOL/L (ref 20–31)
CREAT SERPL-MCNC: 0.75 MG/DL (ref 0.7–1.2)
DIFFERENTIAL TYPE: ABNORMAL
EOSINOPHILS RELATIVE PERCENT: 7 % (ref 1–4)
ETHANOL PERCENT: 0.27 %
ETHANOL: 268 MG/DL
GFR AFRICAN AMERICAN: >60 ML/MIN
GFR NON-AFRICAN AMERICAN: >60 ML/MIN
GFR SERPL CREATININE-BSD FRML MDRD: NORMAL ML/MIN/{1.73_M2}
GFR SERPL CREATININE-BSD FRML MDRD: NORMAL ML/MIN/{1.73_M2}
GLUCOSE BLD-MCNC: 93 MG/DL (ref 70–99)
HCT VFR BLD CALC: 42.4 % (ref 40.7–50.3)
HEMOGLOBIN: 14 G/DL (ref 13–17)
IMMATURE GRANULOCYTES: 0 %
LYMPHOCYTES # BLD: 19 % (ref 24–43)
MCH RBC QN AUTO: 32.9 PG (ref 25.2–33.5)
MCHC RBC AUTO-ENTMCNC: 33 G/DL (ref 28.4–34.8)
MCV RBC AUTO: 99.8 FL (ref 82.6–102.9)
MONOCYTES # BLD: 7 % (ref 3–12)
NRBC AUTOMATED: 0 PER 100 WBC
PDW BLD-RTO: 13.2 % (ref 11.8–14.4)
PLATELET # BLD: 178 K/UL (ref 138–453)
PLATELET ESTIMATE: ABNORMAL
PMV BLD AUTO: 11.1 FL (ref 8.1–13.5)
POTASSIUM SERPL-SCNC: 3.9 MMOL/L (ref 3.7–5.3)
RBC # BLD: 4.25 M/UL (ref 4.21–5.77)
RBC # BLD: ABNORMAL 10*6/UL
SALICYLATE LEVEL: <1 MG/DL (ref 3–10)
SEG NEUTROPHILS: 65 % (ref 36–65)
SEGMENTED NEUTROPHILS ABSOLUTE COUNT: 6.33 K/UL (ref 1.5–8.1)
SODIUM BLD-SCNC: 139 MMOL/L (ref 135–144)
TOXIC TRICYCLIC SC,BLOOD: NEGATIVE
WBC # BLD: 9.7 K/UL (ref 3.5–11.3)
WBC # BLD: ABNORMAL 10*3/UL

## 2021-02-27 PROCEDURE — 80307 DRUG TEST PRSMV CHEM ANLYZR: CPT

## 2021-02-27 PROCEDURE — 80179 DRUG ASSAY SALICYLATE: CPT

## 2021-02-27 PROCEDURE — G0480 DRUG TEST DEF 1-7 CLASSES: HCPCS

## 2021-02-27 PROCEDURE — 80143 DRUG ASSAY ACETAMINOPHEN: CPT

## 2021-02-27 PROCEDURE — 99285 EMERGENCY DEPT VISIT HI MDM: CPT

## 2021-02-27 PROCEDURE — 80048 BASIC METABOLIC PNL TOTAL CA: CPT

## 2021-02-27 PROCEDURE — 85025 COMPLETE CBC W/AUTO DIFF WBC: CPT

## 2021-02-27 ASSESSMENT — ENCOUNTER SYMPTOMS
ABDOMINAL PAIN: 0
COUGH: 0
VOMITING: 0
PHOTOPHOBIA: 0
SHORTNESS OF BREATH: 0
NAUSEA: 0
SORE THROAT: 0

## 2021-02-27 NOTE — ED NOTES
[] Dev    [] [address]    [x]  [facility] ASSESSMENT      Y  N     [x] [] In the past two weeks have you had thoughts of hurting yourself in any way? [x] [] In the past two weeks have you had thoughts that you would be better off dead? [] [x] Have you made a suicide attempt in the past two months? [x] [] Do you have a plan for hurting yourself or suicide? [] [x] Presence of hallucinations/voices related to hurting himself or herself or someone else. SUICIDE/SECURITY WATCH PRECAUTION CHECKLIST     Orders    [x]  Suicide/Security Watch Precautions initiated as checked below:   2/27/21 4:15 PM EST BH31/BH31A    [x] Notified physician:  Alexander Clark MD  2/27/21 4:15 PM EST    [x] Orders obtained as appropriate:     [x] 1:1 Observer     [] Psych Consult     [] Psych Consult    Name:  Date:  Time:    [x] 1:1 Observer, Notified by:  Christine Perez Nurse Supervisor    [x] Remove all personal clothes from room and place in snap/paper gown/pants. Slipper only    [x] Remove all personal belongings from room and secured away from patient. Documentation    [x] Initiate Suicide/Security Watch Precaution Flow Sheet    [x] Initiate individualized Care Plan/Problem    [x] Document why precautions initiated on flow sheet (Initiate Nursing Care Plan/Problem)    [x] 1:1 Observer in place; instructions provided. Suicide precautions require observer be within arms length. [x] Nurse-Observer Communication Hand-off initiated by RN, reviewed with Observer. Subsequently used as Hand Off between Observers. [x] Initiate every 15 minute observations per observer as delegated by the RN.     [x] Initiate RN assessment and documentation    Environmental Scan  Search Criteria and Process: OPTIONAL, see Search Policy    [x] Reason for search:    [x] Nursing in presence of second person to search patient    [x] Patient notified of reason for body assessment and belongings search:     Persons present during search:   Results of search and disposition:       Searchers Name: security   These items or items similar should be removed from the room:   [x] Chairs   [x] Telephone   [x] Trash cans and liners   [x] Plastic utensils (order Patient Safety tray)   [x] Empty or remove Sharps containers   [x] All personal clothing/belongings removed   [x] All unnecessary lead wires, electrical cords, draw cords, etc.   [x] Flowers and plants   [x] Double check for lighters, matches, razors, any glass items etc that can be used as weapons. Person completing Checklist: [employer]       GENERAL INFORMATION     Y  N     [x] [] Has the patient been informed that they are on a watch and what that means? [x] [] Can the patient get out of Bed without nursing assistance? [x] [] Can the patient use the restroom without nursing assistance? [x] [] Can the patient walk the halls to [place] their legs? \"   [] [x] Does the patient have metal utensils? [x] [] Have the patient's belongings been placed out of control of the patient? [x] [] Have the patient and his/her belongings been checked for contraband? [x] [] Is the patient under any visitor restrictions? If Yes, explain: in safer area    [] [x] Is the patient under an alias? [facility] Name:   Authorized visitors (no more than two are to be on the list)   Name/Relationship:   Name/Relationship:    Name of Staff member that you  Received this information from?: self    General Description:    Krystal Hines BH31/BH31A male 44 y.o. Admission weight:      Race: [x]  [] Black  []   []   [] [place] [] Other  Facial Hair:  [] Yes  [] No  If yes, please describe: Identifying Marks (i.e. Visible tattoos, scars, etc... ):     NURSING CARE PLAN    Nursing Diagnosis: Risk of Self Directed Harm  [] Actual  [] Potential  Date Started: 2/27/21      Etiological Factors: (related to)  [x] Expressed or implied suicidal ideation/behavior  [x] Depression  [] Suicide attempt      [x] Low self-esteem  [] Hallucinations      [x] Feeling of Hopelessness  [] Substance abuse or withdrawal    [] Dysfunctional family  [] Major traumatic event, eg., divorce, etc   [] Excessive stress/anxiety    2/27/21    Expected Outcomes    Patient will:   [x] Patient will remain safe for the duration of their stay   [x] Patient's environment will be safe, eg. Free of potential suicide weapons   [] Verbalize Recovery from suicidal episode and improvement in self-worth   [x] Discuss feeling that precipitated suicide attempt/thoughts/behavior   [] Will describe available resources for crisis prevention and management   [] Will verbalize positive coping skills     Nursing Intervention   [x] Assessment and Observations hourly   [x] Suicide Precautions implemented with patient, should be 1:1 observation   [x] Document observation [id number] and RN assessment hourly   [] Consult physician for:    [] Psychiatric consult    [] Pharmacological therapy    [] Other:    [x] Patient search completed by security   [x] Initiated appropriate safety protocols by removing from the patient's environment anything that could be used to inflict self injury, eg. Order safe tray, snap gown, etc   [x] Maintain open, warm, caring, non-judgmental attitude/manner towards patient   [] Discuss advantages and disadvantages of existing coping methods/skills   [x] Assist and educate patient with identifying present strengths and coping skills   [x] Keep patient informed regarding plan of care and provide clear concise explanations. Provide the patient/family education information as well as telephone numbers and other information about crisis centers, hot lines, and counselors.     Discharge Planning:   [x] Referral  [] Groups [] Health agencies  [] Other:            Lacie Dhillon RN  02/27/21 2585

## 2021-02-27 NOTE — ED NOTES
Pt to ED cc SI and alc intoxicatio  Pt states that he has felt like this in the past due to depression  Pt also admits to alcohol use, unsure how much  Pt states his plan is to run in front of a car   Pt denies HI, CP, SOB  Guard at bedside fro continuous 1:1 observation  All unessecary itmes removed from the room   Suicide precautions are in place         [facility]  02/27/21 4679

## 2021-02-27 NOTE — ED PROVIDER NOTES
[facility] ED  Emergency Department Encounter  EmergencyMedicine Resident     Pt Name:John Kuo  MRN: [id number]  [place] 1970  Date of evaluation: 2/27/21  PCP:  Sabrina Villegas MD    CHIEF COMPLAINT       Chief Complaint   Patient presents with    Suicidal    Alcohol Intoxication       HISTORY OF PRESENT ILLNESS  (Location/Symptom, Timing/Onset, Context/Setting, Quality, Duration, Modifying Factors, Severity.)      Owen Lowe is a 44 y.o. male who presents with Suicidal thoughts. Patient reports that has been very depressed for the past 2 days and today thought about stepping out in front of the stomach. Patient has been drinking a sixpack of beer today but not certain about how much. Patient notes that he was 5-month sober until today when he relapsed. Patient denies any hallucinations or or homicidal ideations. Patient notes he had passive suicidal ideation in the past but no active plan. Patient denies any personal hospital chest pain, shortness breath, nausea/vomiting, abdominal pain, dysuria, numbness/tingling, or weakness. PAST MEDICAL / SURGICAL / SOCIAL / FAMILY HISTORY      has a past medical history of Alcohol abuse, Anxiety, COPD (chronic obstructive pulmonary disease) ([facility] COPD (chronic obstructive pulmonary disease) ([facility] Depression, Head injury with loss of consciousness ([facility] Headache, Hypertension, Liver disease, and Migraine. has a past surgical history that includes Ankle surgery (age 10) and Upper gastrointestinal endoscopy (N/A, 9/17/2019).       Social History     Socioeconomic History    Marital status: Single     Spouse name: Not on file    Number of children: Not on file    Years of education: Not on file    Highest education level: Not on file   Occupational History    Not on file   Social Needs    Financial resource strain: Not on file    Food insecurity     Worry: Not on file     Inability: Not on file   Lovejuice needs     Medical: Not on file     Non-medical: Not on file   Tobacco Use    Smoking status: Current Every Day Smoker     Packs/day: 2.00    Smokeless tobacco: Never Used   Substance and Sexual Activity    Alcohol use: Not Currently     Comment: 12 beers daily    Drug use: Not Currently     Types: Marijuana    Sexual activity: Not on file   Lifestyle    Physical activity     Days per week: Not on file     Minutes per session: Not on file    Stress: Not on file   Relationships    Social connections     Talks on phone: Not on file     Gets together: Not on file     Attends Confucianism service: Not on file     Active member of club or organization: Not on file     Attends meetings of clubs or organizations: Not on file     Relationship status: Not on file    Intimate partner violence     Fear of current or ex partner: Not on file     Emotionally abused: Not on file     Physically abused: Not on file     Forced sexual activity: Not on file   Other Topics Concern    Not on file   Social History Narrative    Not on file       Family History   Problem Relation Age of Onset    High Blood Pressure Mother     Diabetes Mother        Allergies:  Patient has no known allergies. Home Medications:  Prior to Admission medications    Medication Sig Start Date End Date Taking?  Authorizing Provider   acetaminophen (TYLENOL) 325 MG tablet Take 2 tablets by mouth every 6 hours as needed for Pain 2/14/21   Cici Reyes, DO   ibuprofen (IBU) 600 MG tablet Take 1 tablet by mouth every 6 hours as needed for Pain 2/14/21   Cici Reyes DO   lidocaine (LIDODERM) 5 % Place 1 patch onto the skin daily 12 hours on, 12 hours off. 2/14/21   Cici Reyes DO   benzocaine-menthol (CEPACOL) 15-4 MG LOZG lozenge Take 1 lozenge by mouth every 2 hours as needed for Sore Throat 2/14/21   Cici Reyes, DO   DULoxetine 40 MG CPEP Take 40 mg by mouth daily 6/16/20   Jonh Coker MD   traZODone (DESYREL) 50 MG tablet Take 1 tablet by mouth nightly as needed for Sleep 6/15/20   Hakan Olsen MD   risperiDONE (RISPERDAL) 2 MG tablet Take 1 tablet by mouth 2 times daily 6/15/20   Hakan Olsen MD   metoprolol succinate (TOPROL XL) 25 MG extended release tablet Take 1 tablet by mouth daily 6/16/20   Hakan Olsen MD   folic acid (FOLVITE) 1 MG tablet Take 1 tablet by mouth daily 6/15/20   Hakan Olsen MD   pantoprazole (PROTONIX) 40 MG tablet Take 1 tablet by mouth daily 6/16/20   Hakan Olsen MD   vitamin B-1 (THIAMINE) 100 MG tablet Take 1 tablet by mouth daily 6/15/20   Hakan Olsen MD   loratadine (CLARITIN) 10 MG tablet Take 1 tablet by mouth daily 5/4/20   Jasmine Howell MD   meloxicam (MOBIC) 15 MG tablet Take 1 tablet by mouth daily as needed for Pain  Patient taking differently: Take 15 mg by mouth 2 times daily  5/4/20   Jasmine Howell MD   hydrOXYzine (ATARAX) 25 MG tablet Take 25 mg by mouth 3 times daily as needed for Itching    Historical Provider, MD   Multiple Vitamins-Minerals (THERAPEUTIC MULTIVITAMIN-MINERALS) tablet Take 1 tablet by mouth daily OTC with iron 11/27/19   Versa Phoenix, APRN - CNP       REVIEW OF SYSTEMS    (2-9 systems for level 4, 10 or more for level 5)      Review of Systems   Constitutional: Negative for chills, fatigue and fever. HENT: Negative for congestion and sore throat. Eyes: Negative for photophobia and visual disturbance. Respiratory: Negative for cough and shortness of breath. Cardiovascular: Negative for chest pain and palpitations. Gastrointestinal: Negative for abdominal pain, nausea and vomiting. Genitourinary: Negative for dysuria and hematuria. Musculoskeletal: Negative for arthralgias and myalgias. Skin: Negative for rash and wound. Neurological: Negative for weakness and numbness. Psychiatric/Behavioral: Positive for dysphoric mood and suicidal ideas. Negative for agitation, hallucinations and self-injury.        PHYSICAL EXAM   (up to 7 for level 4, 8 or more for level 5)      INITIAL VITALS:   /78   Pulse 77   Temp 98.4 °F (36.9 °C) (Skin)   Resp 16   SpO2 100%     Physical Exam  Vitals signs and nursing note reviewed. Constitutional:       General: He is not in acute distress. Appearance: Normal appearance. He is well-developed and normal weight. He is not toxic-appearing or diaphoretic. HENT:      Head: Normocephalic and atraumatic. Right Ear: External ear normal.      Left Ear: External ear normal.      Nose: Nose normal.      Mouth/Throat:      Mouth: Mucous membranes are moist.   Eyes:      General: No scleral icterus. Extraocular Movements: Extraocular movements intact. Conjunctiva/sclera: Conjunctivae normal.   Neck:      Musculoskeletal: Normal range of motion and neck supple. No neck rigidity. Vascular: No JVD. Trachea: No tracheal deviation. Cardiovascular:      Rate and Rhythm: Normal rate and regular rhythm. Pulses: Normal pulses. Heart sounds: Normal heart sounds, S1 normal and S2 normal. No murmur. No friction rub. No gallop. Pulmonary:      Effort: Pulmonary effort is normal. No respiratory distress. Breath sounds: Normal breath sounds. Abdominal:      General: Abdomen is flat. There is no distension. Palpations: Abdomen is soft. Tenderness: There is no abdominal tenderness. There is no guarding or rebound. Musculoskeletal: Normal range of motion. General: No swelling or tenderness (Bilateral calves nontender palpation. ). Skin:     General: Skin is warm and dry. Capillary Refill: Capillary refill takes less than 2 seconds. Neurological:      General: No focal deficit present. Mental Status: He is alert and oriented to person, place, and time. Motor: No abnormal muscle tone. Comments: Patient is visibly intoxicated and has to be reoriented to follow commands. Patient was all extremities.   Sensation grossly intact to light touch in all extremities. DIFFERENTIAL  DIAGNOSIS     PLAN (LABS / IMAGING / EKG):  Orders Placed This Encounter   Procedures    TOX SCR, BLD, ED    CBC WITH AUTO DIFFERENTIAL    BASIC METABOLIC PANEL    Urine Drug Screen       MEDICATIONS ORDERED:  No orders of the defined types were placed in this encounter.       DDX: EtOH intoxication, suicidal ideation/plan    DIAGNOSTIC RESULTS / [address] / [facility]   LAB RESULTS:  Results for orders placed or performed during the hospital encounter of 02/27/21   TOX SCR, BLD, ED   Result Value Ref Range    Acetaminophen Level <5 (L) 10 - 30 ug/mL    Ethanol 268 (H) <10 mg/dL    Ethanol percent 0.268 (H) <5.499 %    Salicylate Lvl <1 (L) 3 - 10 mg/dL    Toxic Tricyclic Sc,Blood NEGATIVE NEGATIVE   CBC WITH AUTO DIFFERENTIAL   Result Value Ref Range    WBC 9.7 3.5 - 11.3 k/uL    RBC 4.25 4.21 - 5.77 m/uL    Hemoglobin 14.0 13.0 - 17.0 g/dL    Hematocrit 42.4 40.7 - 50.3 %    MCV 99.8 82.6 - 102.9 fL    MCH 32.9 25.2 - 33.5 pg    MCHC 33.0 28.4 - 34.8 g/dL    RDW 13.2 11.8 - 14.4 %    Platelets 630 098 - 146 k/uL    MPV 11.1 8.1 - 13.5 fL    NRBC Automated 0.0 0.0 per 100 WBC    Differential Type NOT REPORTED     Seg Neutrophils 65 36 - 65 %    Lymphocytes 19 (L) 24 - 43 %    Monocytes 7 3 - 12 %    Eosinophils % 7 (H) 1 - 4 %    Basophils 2 0 - 2 %    Immature Granulocytes 0 0 %    Segs Absolute 6.33 1.50 - 8.10 k/uL    Absolute Lymph # 1.86 1.10 - 3.70 k/uL    Absolute Mono # 0.64 0.10 - 1.20 k/uL    Absolute Eos # 0.71 (H) 0.00 - 0.44 k/uL    Basophils Absolute 0.15 0.00 - 0.20 k/uL    Absolute Immature Granulocyte <0.03 0.00 - 0.30 k/uL    WBC Morphology NOT REPORTED     RBC Morphology NOT REPORTED     Platelet Estimate NOT REPORTED    BASIC METABOLIC PANEL   Result Value Ref Range    Glucose 93 70 - 99 mg/dL    BUN 6 6 - 20 mg/dL    CREATININE 0.75 0.70 - 1.20 mg/dL    Bun/Cre Ratio NOT REPORTED 9 - 20    Calcium 8.9 8.6 - 10.4 mg/dL    Sodium 139 135 - 144 mmol/L    Potassium 3.9 3.7 - 5.3 mmol/L    Chloride 103 98 - 107 mmol/L    CO2 23 20 - 31 mmol/L    Anion Gap 13 9 - 17 mmol/L    GFR Non-African American >60 >60 mL/min    GFR African American >60 >60 mL/min    GFR Comment          GFR Staging NOT REPORTED        IMPRESSION: 68-year-old male was intoxicated and presents for suicidal ideation with plan. Patient peers to be no acute distress and nontoxic-appearing. Patient is visibly intoxicated. No focal neuro deficits. Lungs are clear. Heart is RRR. Abdomen soft nontender palpation. Concern for above differential diagnosis. Will order ED tox, CBC and BMP for possible [facility] mission. Patient currently otherwise medically cleared pending reevaluation after clinically sober. RADIOLOGY:  none    EKG  none    All EKG's are interpreted by the Emergency Department Physician who either signs or Co-signs this chart in the absence of a cardiologist.    EMERGENCY DEPARTMENT COURSE:  ED Course as of Feb 28 0113   Sat Feb 27, 2021   1603 CBC is nonconcerning.    [CS]   5863 Sober times approximately 10 PM.   Ethanol percent(!): 0.268 [CS]   1623 BMP is nonconcerning.    [CS]   2044 Patient was reevaluated at bedside. Patient found to be resting comfortably in bed. [CS]   8939 Patient was reevaluated. Patient now sober. Patient was adherent came in for thoughts of self-harm but not a sober does not have any current plans to hurt himself. Social work has saw him and recommends outpatient psychiatry. Patient be discharged to follow-up with outpatient psych. [CS]      ED Course User Index  [CS] Guido Carrel, DO         PROCEDURES:  none    CONSULTS:  None    CRITICAL CARE:  Please see attending note    FINAL IMPRESSION      1. Suicidal ideation    2.  Acute alcoholic intoxication without complication [facility])          DISPOSITION / PLAN     DISPOSITION Decision To Discharge 02/27/2021 10:54:15 PM      PATIENT REFERRED TO:  Vahid Royal MD  [address] [place] [address]  [phone]    Schedule an appointment as soon as possible for a visit   As needed    [facility] [place] [facility]  [address]  Go to   If symptoms worsen    [place] [address] [address]  [address] [place] [address]  [phone]    Schedule an appointment as soon as possible for a visit   for your suicidal ideation      DISCHARGE MEDICATIONS:  Discharge Medication List as of 2/27/2021 11:36 PM          Mariano Lee DO  Emergency Medicine Resident    (Please note that portions of thisnote were completed with a voice recognition program.  Efforts were made to edit the dictations but occasionally words are mis-transcribed.)       Mariano Lee DO  Resident  02/28/21 3812

## 2021-02-27 NOTE — ED PROVIDER NOTES
[address] ED     Emergency Department     Faculty Attestation        I performed a history and physical examination of the patient and discussed management with the resident. I reviewed the residents note and agree with the documented findings and plan of care. Any areas of disagreement are noted on the chart. I was personally present for the key portions of any procedures. I have documented in the chart those procedures where I was not present during the key portions. I have reviewed the emergency nurses triage note. I agree with the chief complaint, past medical history, past surgical history, allergies, medications, social and family history as documented unless otherwise noted below. For mid-level providers such as nurse practitioners as well as physicians assistants:    I have personally seen and evaluated the patient. I find the patient's history and physical exam are consistent with NP/PA documentation. I agree with the care provided, treatment rendered, disposition, & follow-up plan. Additional findings are as noted. Vital Signs: /78   Pulse 77   Temp 98.4 °F (36.9 °C) (Skin)   Resp 16   SpO2 100%   PCP:  Adriana Mac MD    Pertinent Comments:     Admits to being suicidal.  No specific plan. No medical complaints per admits to drinking alcohol appears clinically intoxicated.       Critical Care  None          Phyllis Blackman MD  Attending Emergency Medicine Physician              Cristóbal Mustafa MD  02/27/21 0071

## 2021-02-28 NOTE — ED NOTES
Pt resting on stretcher, no respiratory distress noted, pt updated on plan of care, will continue to monitor, call light in reach.   Guard at bedside fro continuous 1:1 observation  All unessecary itmes removed from the room   Suicide precautions are in place       [facility]rell[facility]  02/27/21 2368

## 2021-02-28 NOTE — ED NOTES
Pt resting on stretcher, no respiratory distress noted, pt updated on plan of care, will continue to monitor, call light in reach.   Guard at bedside fro continuous 1:1 observation  All unessecary itmes removed from the room   Suicide precautions are in place     Amarjit JensenUpper Allegheny Health System  02/27/21 1412

## 2021-02-28 NOTE — ED PROVIDER NOTES
[address] HANDOFF       Handoff taken on the following patient from prior Attending Physician:  Pt Name: Yanet oV  PCP:  Harmeet Phelps MD    Attestation  I was available and discussed any additional care issues that arose and coordinated the management plans with the resident(s) caring for the patient during my duty period. Any areas of disagreement with resident's documentation of care or procedures are noted on the chart. I was personally present for the key portions of any/all procedures during my duty period. I have documented in the chart those procedures where I was not present during the key portions.        ETOH, suicidal, reassess when Jaison Morelos MD  02/27/21 8328

## 2021-02-28 NOTE — ED NOTES
Pt resting on stretcher, no respiratory distress noted, pt updated on plan of care, will continue to monitor, call light in reach.   Guard at bedside fro continuous 1:1 observation  All unessecary itmes removed from the room   Suicide precautions are in place     [place], [place]  02/27/21 5128

## 2021-02-28 NOTE — ED NOTES
Pt resting on stretcher, no respiratory distress noted, pt updated on plan of care, will continue to monitor, call light in reach.   Guard at bedside fro continuous 1:1 observation  All unessecary itmes removed from the room   Suicide precautions are in place       [place], [place]  02/27/21 1482

## 2021-02-28 NOTE — ED NOTES
Pt resting on stretcher, no respiratory distress noted, pt updated on plan of care, will continue to monitor, call light in reach.   Guard at bedside fro continuous 1:1 observation  All unessecary itmes removed from the room   Suicide precautions are in place       [place], [place]  02/27/21 9518

## 2021-02-28 NOTE — ED NOTES
The patient is a 44year old male that has a history of Alcohol Use and Depression. Patient came to the ER today intoxicated and passive suicidal thoughts. SW met with the patient after his sober time to re-evaluate mental health symptoms. SW asked the patient multiple times about suicidal thoughts. Patient responded every time stating, \"I don't know. \" Patient denied and suicidal plans. Patient does state that he is dealing with depression and does appear hopeless about his alcohol relapse. Patient states that about 6 months ago he stopped taking his medication to manage his depression symptoms. Patient denied any other mental health  Symptoms such as hallucinations, delusions, anxiety and homicidal thoughts or plans. Patient agreed to follow up with the [facility] and come back to the ER or [facility] if his mental health symptoms become worse.

## 2021-02-28 NOTE — ED NOTES
Pt resting on stretcher, no respiratory distress noted, pt updated on plan of care, will continue to monitor, call light in reach.   Guard at bedside fro continuous 1:1 observation  All unessecary itmes removed from the room   Suicide precautions are in place       Carmen Bentley, [address] [place] [address]  02/27/21 7749

## 2021-02-28 NOTE — ED NOTES
Pt resting on stretcher, no respiratory distress noted, pt updated on plan of care, will continue to monitor, call light in reach.   Guard at bedside fro continuous 1:1 observation  All unessecary itmes removed from the room   Suicide precautions are in place       [place], [address]  02/27/21 4260

## 2021-02-28 NOTE — ED NOTES
Pt resting on stretcher, no respiratory distress noted, pt updated on plan of care, will continue to monitor, call light in reach.   Guard at bedside fro continuous 1:1 observation  All unessecary itmes removed from the room   Suicide precautions are in place       [place], [place]  02/27/21 4447

## 2021-03-15 ENCOUNTER — APPOINTMENT (OUTPATIENT)
Dept: GENERAL RADIOLOGY | Age: 51
DRG: 098 | End: 2021-03-15
Payer: COMMERCIAL

## 2021-03-15 ENCOUNTER — APPOINTMENT (OUTPATIENT)
Dept: CT IMAGING | Age: 51
DRG: 098 | End: 2021-03-15
Payer: COMMERCIAL

## 2021-03-15 ENCOUNTER — HOSPITAL ENCOUNTER (INPATIENT)
Age: 51
LOS: 1 days | Discharge: HOME | DRG: 098 | End: 2021-03-17
Attending: EMERGENCY MEDICINE | Admitting: INTERNAL MEDICINE
Payer: COMMERCIAL

## 2021-03-15 DIAGNOSIS — R22.1 NECK MASS: Primary | ICD-10-CM

## 2021-03-15 DIAGNOSIS — T33.821D FROSTBITE OF BOTH FEET, SUBSEQUENT ENCOUNTER: ICD-10-CM

## 2021-03-15 DIAGNOSIS — T33.822D FROSTBITE OF BOTH FEET, SUBSEQUENT ENCOUNTER: ICD-10-CM

## 2021-03-15 PROBLEM — T33.822A FROSTBITE OF BOTH FEET: Status: ACTIVE | Noted: 2021-03-15

## 2021-03-15 PROBLEM — T33.821A FROSTBITE OF BOTH FEET: Status: ACTIVE | Noted: 2021-03-15

## 2021-03-15 LAB
ABSOLUTE EOS #: 0.57 K/UL (ref 0–0.44)
ABSOLUTE IMMATURE GRANULOCYTE: 0.03 K/UL (ref 0–0.3)
ABSOLUTE LYMPH #: 1.17 K/UL (ref 1.1–3.7)
ABSOLUTE MONO #: 0.86 K/UL (ref 0.1–1.2)
AMPHETAMINE SCREEN URINE: NEGATIVE
ANION GAP SERPL CALCULATED.3IONS-SCNC: 11 MMOL/L (ref 9–17)
BARBITURATE SCREEN URINE: NEGATIVE
BASOPHILS # BLD: 2 % (ref 0–2)
BASOPHILS ABSOLUTE: 0.16 K/UL (ref 0–0.2)
BENZODIAZEPINE SCREEN, URINE: NEGATIVE
BUN BLDV-MCNC: 11 MG/DL (ref 6–20)
BUN/CREAT BLD: ABNORMAL (ref 9–20)
BUPRENORPHINE URINE: ABNORMAL
C-REACTIVE PROTEIN: 17.7 MG/L (ref 0–5)
CALCIUM SERPL-MCNC: 9.4 MG/DL (ref 8.6–10.4)
CANNABINOID SCREEN URINE: POSITIVE
CHLORIDE BLD-SCNC: 100 MMOL/L (ref 98–107)
CO2: 27 MMOL/L (ref 20–31)
COCAINE METABOLITE, URINE: NEGATIVE
CREAT SERPL-MCNC: 0.6 MG/DL (ref 0.7–1.2)
D-DIMER QUANTITATIVE: 0.8 MG/L FEU
DIFFERENTIAL TYPE: ABNORMAL
EOSINOPHILS RELATIVE PERCENT: 6 % (ref 1–4)
GFR AFRICAN AMERICAN: >60 ML/MIN
GFR NON-AFRICAN AMERICAN: >60 ML/MIN
GFR SERPL CREATININE-BSD FRML MDRD: ABNORMAL ML/MIN/{1.73_M2}
GFR SERPL CREATININE-BSD FRML MDRD: ABNORMAL ML/MIN/{1.73_M2}
GLUCOSE BLD-MCNC: 81 MG/DL (ref 70–99)
HCT VFR BLD CALC: 43 % (ref 40.7–50.3)
HEMOGLOBIN: 14.3 G/DL (ref 13–17)
IMMATURE GRANULOCYTES: 0 %
LYMPHOCYTES # BLD: 12 % (ref 24–43)
MCH RBC QN AUTO: 33.1 PG (ref 25.2–33.5)
MCHC RBC AUTO-ENTMCNC: 33.3 G/DL (ref 28.4–34.8)
MCV RBC AUTO: 99.5 FL (ref 82.6–102.9)
MDMA URINE: ABNORMAL
METHADONE SCREEN, URINE: NEGATIVE
METHAMPHETAMINE, URINE: ABNORMAL
MONOCYTES # BLD: 9 % (ref 3–12)
NRBC AUTOMATED: 0 PER 100 WBC
OPIATES, URINE: NEGATIVE
OXYCODONE SCREEN URINE: NEGATIVE
PDW BLD-RTO: 13.9 % (ref 11.8–14.4)
PHENCYCLIDINE, URINE: NEGATIVE
PLATELET # BLD: 226 K/UL (ref 138–453)
PLATELET ESTIMATE: ABNORMAL
PMV BLD AUTO: 10.5 FL (ref 8.1–13.5)
POTASSIUM SERPL-SCNC: 3.9 MMOL/L (ref 3.7–5.3)
PROPOXYPHENE, URINE: ABNORMAL
RBC # BLD: 4.32 M/UL (ref 4.21–5.77)
RBC # BLD: ABNORMAL 10*6/UL
SEDIMENTATION RATE, ERYTHROCYTE: 51 MM (ref 0–20)
SEG NEUTROPHILS: 71 % (ref 36–65)
SEGMENTED NEUTROPHILS ABSOLUTE COUNT: 6.69 K/UL (ref 1.5–8.1)
SODIUM BLD-SCNC: 138 MMOL/L (ref 135–144)
TEST INFORMATION: ABNORMAL
TRICYCLIC ANTIDEPRESSANTS, UR: ABNORMAL
TROPONIN INTERP: NORMAL
TROPONIN INTERP: NORMAL
TROPONIN T: NORMAL NG/ML
TROPONIN T: NORMAL NG/ML
TROPONIN, HIGH SENSITIVITY: 11 NG/L (ref 0–22)
TROPONIN, HIGH SENSITIVITY: 14 NG/L (ref 0–22)
WBC # BLD: 9.5 K/UL (ref 3.5–11.3)
WBC # BLD: ABNORMAL 10*3/UL

## 2021-03-15 PROCEDURE — 80307 DRUG TEST PRSMV CHEM ANLYZR: CPT

## 2021-03-15 PROCEDURE — 99283 EMERGENCY DEPT VISIT LOW MDM: CPT

## 2021-03-15 PROCEDURE — 86140 C-REACTIVE PROTEIN: CPT

## 2021-03-15 PROCEDURE — 85025 COMPLETE CBC W/AUTO DIFF WBC: CPT

## 2021-03-15 PROCEDURE — 6360000004 HC RX CONTRAST MEDICATION: Performed by: STUDENT IN AN ORGANIZED HEALTH CARE EDUCATION/TRAINING PROGRAM

## 2021-03-15 PROCEDURE — 80179 DRUG ASSAY SALICYLATE: CPT

## 2021-03-15 PROCEDURE — 71260 CT THORAX DX C+: CPT

## 2021-03-15 PROCEDURE — G0480 DRUG TEST DEF 1-7 CLASSES: HCPCS

## 2021-03-15 PROCEDURE — 36415 COLL VENOUS BLD VENIPUNCTURE: CPT

## 2021-03-15 PROCEDURE — 80048 BASIC METABOLIC PNL TOTAL CA: CPT

## 2021-03-15 PROCEDURE — 84484 ASSAY OF TROPONIN QUANT: CPT

## 2021-03-15 PROCEDURE — G0378 HOSPITAL OBSERVATION PER HR: HCPCS

## 2021-03-15 PROCEDURE — 93005 ELECTROCARDIOGRAM TRACING: CPT | Performed by: STUDENT IN AN ORGANIZED HEALTH CARE EDUCATION/TRAINING PROGRAM

## 2021-03-15 PROCEDURE — 80143 DRUG ASSAY ACETAMINOPHEN: CPT

## 2021-03-15 PROCEDURE — 73630 X-RAY EXAM OF FOOT: CPT

## 2021-03-15 PROCEDURE — 85379 FIBRIN DEGRADATION QUANT: CPT

## 2021-03-15 PROCEDURE — 99223 1ST HOSP IP/OBS HIGH 75: CPT | Performed by: PODIATRIST

## 2021-03-15 PROCEDURE — 2580000003 HC RX 258: Performed by: NURSE PRACTITIONER

## 2021-03-15 PROCEDURE — 99219 PR INITIAL OBSERVATION CARE/DAY 50 MINUTES: CPT | Performed by: INTERNAL MEDICINE

## 2021-03-15 PROCEDURE — 70491 CT SOFT TISSUE NECK W/DYE: CPT

## 2021-03-15 PROCEDURE — 85652 RBC SED RATE AUTOMATED: CPT

## 2021-03-15 PROCEDURE — 71046 X-RAY EXAM CHEST 2 VIEWS: CPT

## 2021-03-15 RX ORDER — LORAZEPAM 2 MG/ML
3 INJECTION INTRAMUSCULAR
Status: DISCONTINUED | OUTPATIENT
Start: 2021-03-15 | End: 2021-03-17 | Stop reason: HOSPADM

## 2021-03-15 RX ORDER — LORAZEPAM 1 MG/1
2 TABLET ORAL
Status: DISCONTINUED | OUTPATIENT
Start: 2021-03-15 | End: 2021-03-17 | Stop reason: HOSPADM

## 2021-03-15 RX ORDER — LORAZEPAM 2 MG/ML
4 INJECTION INTRAMUSCULAR
Status: DISCONTINUED | OUTPATIENT
Start: 2021-03-15 | End: 2021-03-17 | Stop reason: HOSPADM

## 2021-03-15 RX ORDER — PROMETHAZINE HYDROCHLORIDE 12.5 MG/1
12.5 TABLET ORAL EVERY 6 HOURS PRN
Status: DISCONTINUED | OUTPATIENT
Start: 2021-03-15 | End: 2021-03-17 | Stop reason: HOSPADM

## 2021-03-15 RX ORDER — LORAZEPAM 2 MG/ML
1 INJECTION INTRAMUSCULAR
Status: DISCONTINUED | OUTPATIENT
Start: 2021-03-15 | End: 2021-03-17 | Stop reason: HOSPADM

## 2021-03-15 RX ORDER — POLYETHYLENE GLYCOL 3350 17 G/17G
17 POWDER, FOR SOLUTION ORAL DAILY PRN
Status: DISCONTINUED | OUTPATIENT
Start: 2021-03-15 | End: 2021-03-17 | Stop reason: HOSPADM

## 2021-03-15 RX ORDER — LORAZEPAM 1 MG/1
3 TABLET ORAL
Status: DISCONTINUED | OUTPATIENT
Start: 2021-03-15 | End: 2021-03-17 | Stop reason: HOSPADM

## 2021-03-15 RX ORDER — LORAZEPAM 1 MG/1
1 TABLET ORAL
Status: DISCONTINUED | OUTPATIENT
Start: 2021-03-15 | End: 2021-03-17 | Stop reason: HOSPADM

## 2021-03-15 RX ORDER — ACETAMINOPHEN 650 MG/1
650 SUPPOSITORY RECTAL EVERY 6 HOURS PRN
Status: DISCONTINUED | OUTPATIENT
Start: 2021-03-15 | End: 2021-03-17 | Stop reason: HOSPADM

## 2021-03-15 RX ORDER — LORAZEPAM 1 MG/1
4 TABLET ORAL
Status: DISCONTINUED | OUTPATIENT
Start: 2021-03-15 | End: 2021-03-17 | Stop reason: HOSPADM

## 2021-03-15 RX ORDER — ACETAMINOPHEN 325 MG/1
650 TABLET ORAL EVERY 6 HOURS PRN
Status: DISCONTINUED | OUTPATIENT
Start: 2021-03-15 | End: 2021-03-17 | Stop reason: HOSPADM

## 2021-03-15 RX ORDER — LORAZEPAM 2 MG/ML
2 INJECTION INTRAMUSCULAR
Status: DISCONTINUED | OUTPATIENT
Start: 2021-03-15 | End: 2021-03-17 | Stop reason: HOSPADM

## 2021-03-15 RX ORDER — SODIUM CHLORIDE 0.9 % (FLUSH) 0.9 %
10 SYRINGE (ML) INJECTION EVERY 12 HOURS SCHEDULED
Status: DISCONTINUED | OUTPATIENT
Start: 2021-03-15 | End: 2021-03-17 | Stop reason: HOSPADM

## 2021-03-15 RX ORDER — SODIUM CHLORIDE 0.9 % (FLUSH) 0.9 %
10 SYRINGE (ML) INJECTION PRN
Status: DISCONTINUED | OUTPATIENT
Start: 2021-03-15 | End: 2021-03-17 | Stop reason: HOSPADM

## 2021-03-15 RX ORDER — ONDANSETRON 2 MG/ML
4 INJECTION INTRAMUSCULAR; INTRAVENOUS EVERY 6 HOURS PRN
Status: DISCONTINUED | OUTPATIENT
Start: 2021-03-15 | End: 2021-03-17 | Stop reason: HOSPADM

## 2021-03-15 RX ADMIN — SODIUM CHLORIDE, PRESERVATIVE FREE 10 ML: 5 INJECTION INTRAVENOUS at 22:48

## 2021-03-15 RX ADMIN — IOPAMIDOL 75 ML: 755 INJECTION, SOLUTION INTRAVENOUS at 15:15

## 2021-03-15 RX ADMIN — IOPAMIDOL 85 ML: 755 INJECTION, SOLUTION INTRAVENOUS at 14:05

## 2021-03-15 ASSESSMENT — ENCOUNTER SYMPTOMS
SORE THROAT: 0
NAUSEA: 0
STRIDOR: 0
COUGH: 1
BLOOD IN STOOL: 0
VOMITING: 0
ABDOMINAL DISTENTION: 0
DIARRHEA: 0
PHOTOPHOBIA: 0
COUGH: 0
COLOR CHANGE: 1
SHORTNESS OF BREATH: 0
WHEEZING: 0
SHORTNESS OF BREATH: 1

## 2021-03-15 ASSESSMENT — PAIN DESCRIPTION - LOCATION: LOCATION: FOOT

## 2021-03-15 ASSESSMENT — PAIN SCALES - GENERAL
PAINLEVEL_OUTOF10: 10
PAINLEVEL_OUTOF10: 0

## 2021-03-15 ASSESSMENT — PAIN DESCRIPTION - ORIENTATION: ORIENTATION: RIGHT;LEFT

## 2021-03-15 ASSESSMENT — PAIN DESCRIPTION - FREQUENCY: FREQUENCY: CONTINUOUS

## 2021-03-15 NOTE — CONSULTS
and Upper gastrointestinal endoscopy (N/A, 9/17/2019). Medications  Prior to Admission medications    Medication Sig Start Date End Date Taking?  Authorizing Provider   acetaminophen (TYLENOL) 325 MG tablet Take 2 tablets by mouth every 6 hours as needed for Pain 2/14/21   Marcelino Bolus Gruenbaum, DO   ibuprofen (IBU) 600 MG tablet Take 1 tablet by mouth every 6 hours as needed for Pain 2/14/21   Marcelino Bolus Gruenbaum, DO   lidocaine (LIDODERM) 5 % Place 1 patch onto the skin daily 12 hours on, 12 hours off. 2/14/21   Marcelino Bolus Gruenbaum, DO   benzocaine-menthol (CEPACOL) 15-4 MG LOZG lozenge Take 1 lozenge by mouth every 2 hours as needed for Sore Throat 2/14/21   Marcelino Bolus Gruenbaum, DO   DULoxetine 40 MG CPEP Take 40 mg by mouth daily 6/16/20   Juan Valverde MD   traZODone (DESYREL) 50 MG tablet Take 1 tablet by mouth nightly as needed for Sleep 6/15/20   Juan Valverde MD   risperiDONE (RISPERDAL) 2 MG tablet Take 1 tablet by mouth 2 times daily 6/15/20   Juan Valverde MD   metoprolol succinate (TOPROL XL) 25 MG extended release tablet Take 1 tablet by mouth daily 6/16/20   Juan Valverde MD   folic acid (FOLVITE) 1 MG tablet Take 1 tablet by mouth daily 6/15/20   Juan Valverde MD   pantoprazole (PROTONIX) 40 MG tablet Take 1 tablet by mouth daily 6/16/20   Juan Valverde MD   vitamin B-1 (THIAMINE) 100 MG tablet Take 1 tablet by mouth daily 6/15/20   Juan Valverde MD   loratadine (CLARITIN) 10 MG tablet Take 1 tablet by mouth daily 5/4/20   Jaleel West MD   meloxicam (MOBIC) 15 MG tablet Take 1 tablet by mouth daily as needed for Pain  Patient taking differently: Take 15 mg by mouth 2 times daily  5/4/20   Jaleel West MD   hydrOXYzine (ATARAX) 25 MG tablet Take 25 mg by mouth 3 times daily as needed for Itching    Historical Provider, MD   Multiple Vitamins-Minerals (THERAPEUTIC MULTIVITAMIN-MINERALS) tablet Take 1 tablet by mouth daily OTC with iron 11/27/19   KODY Pacheco CNP    Scheduled Meds:   sodium chloride flush  10 mL Intravenous 2 times per day    enoxaparin  40 mg Subcutaneous Daily     Continuous Infusions:  PRN Meds:.sodium chloride flush, promethazine **OR** ondansetron, polyethylene glycol, acetaminophen **OR** acetaminophen, LORazepam **OR** LORazepam **OR** LORazepam **OR** LORazepam **OR** LORazepam **OR** LORazepam **OR** LORazepam **OR** LORazepam    Allergies  has No Known Allergies. Family History  family history includes Diabetes in his mother; High Blood Pressure in his mother. Social History   reports that he has been smoking. He has been smoking about 2.00 packs per day. He has never used smokeless tobacco.   reports previous alcohol use. reports previous drug use. Drug: Marijuana. Objective     Vitals:  Patient Vitals for the past 8 hrs:   BP Temp Temp src Pulse Resp SpO2 Height Weight   03/15/21 1803 (!) 131/99 -- -- 86 -- -- -- --   03/15/21 1802 (!) 131/99 98.8 °F (37.1 °C) Oral 86 16 100 % -- --   03/15/21 1731 (!) 128/92 -- -- 77 14 96 % -- --   03/15/21 1259 (!) 151/88 97.9 °F (36.6 °C) Oral 93 18 99 % 6' (1.829 m) 220 lb (99.8 kg)     Average, Min, and Max for last 24 hours Vitals:  TEMPERATURE:  Temp  Av.4 °F (36.9 °C)  Min: 97.9 °F (36.6 °C)  Max: 98.8 °F (37.1 °C)    RESPIRATIONS RANGE: Resp  Av  Min: 14  Max: 18    PULSE RANGE: Pulse  Av.5  Min: 77  Max: 93    BLOOD PRESSURE RANGE:  Systolic (20ESY), PFT:449 , Min:128 , PDD:939   ; Diastolic (82ABW), VJI:98, Min:88, Max:99      PULSE OXIMETRY RANGE: SpO2  Av.3 %  Min: 96 %  Max: 100 %  I&O:  No intake/output data recorded. CBC:  Recent Labs     03/15/21  1317   WBC 9.5   HGB 14.3   HCT 43.0           BMP:  Recent Labs     03/15/21  1317      K 3.9      CO2 27   BUN 11   CREATININE 0.60*   GLUCOSE 81   CALCIUM 9.4        Coags:  No results for input(s): APTT, PROT, INR in the last 72 hours.     Lab Results   Component Value Date    LABA1C 4.9 10/11/2019     No results found for: SEDRATE  No results found for: CRP      Lower Extremity Physical Exam:  Vascular: DP and PT pulses are palpable. CFT >5 seconds to the distal aspects of the distal digits 1,4,5 on the right and 1,2,4 on the left. Hair growth is absent to the level of the digits. No edema. Neuro: Saph/sural/SP/DP/plantar sensation diminished to light touch. Musculoskeletal: Muscle strength is 5/5 to all lower extremity muscle groups. Gross deformity is absent. Dermatologic: Multiple stable, well adhered eschars noted to the distal digits. No active bleeding, drainage or purulence. No streaking lymphangitis or cellulitis noted. No induration or soft tissue crepitus. Clinical:              Imaging:   XR FOOT LEFT (MIN 3 VIEWS)   Final Result   Unremarkable left foot. 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 The   mass invades the superior margin of the right lobe of the thyroid gland and   likely invasion of the left thyroid cartilage. The mass infiltrates along   the left prevertebral space from C4 through C7. Left level 3 and level 4 jugular lymphadenopathy. CT CHEST PULMONARY EMBOLISM W CONTRAST   Final Result   No evidence of pulmonary embolism or acute pulmonary abnormality. .  Abnormal   appearance of the left side of the neck with enlarged lymph nodes as well as   findings of a mass anterior to the left carotid artery widening the distance   from the carotid artery in the hyoid bone on the left. This is incompletely visualized. Further evaluation with CT of the of the   neck is recommended. Findings were discussed with Neema Shine at 2:43 pm on 3/15/2021. RECOMMENDATIONS:   CT neck for evaluation of left-sided mass thoracic inlet         XR CHEST (2 VW)   Final Result   No acute pulmonary process. XR FOOT RIGHT (MIN 3 VIEWS)    (Results Pending)       Cultures:  None    Assessment     Francis Kahn is a 44 y.o. male with   1. Frostbite of feet, bilateral  2. 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 Homelessness    Principal Problem:    Alcohol use disorder, severe, dependence ([facility] [address]  Active Problems:    Hypertension    Alcoholism ([facility] [address]    Tobacco abuse    Seizure disorder (HCC)    Chronic cough    Neck mass    Frostbite of both feet  Resolved Problems:    * No resolved hospital problems. *        Plan     · Patient examined and evaluated at bedside. · Treatment options discussed in detail with the patient. · Etiology of frost bite injuries discussed in detail with the patient. I explained to the patient that we will await tissue demarcation. He has stable gangrene which is dry and remains uninfected. The goal will be to prevent transition to wet gangrene while the tissues demarcate. · Radiographs reviewed and discussed in detail with the patient. · No apparent soft tissue emphysema, osseous pathology. · Dressing applied to Bilateral foot: betadine wet to dry dressing, ACE. · To be performed by nursing staff daily while patient is in house. · WBAT to Bilateral lower extremity. · No acute surgical interventions planned at this time, patient is clear from a podiatry standpoint and can follow up within 1 week of discharge as an OP. · Discussed with Dr. Lisa Hernández. Electronically signed by Silvia Harrington DPM on 3/15/2021 at 6:24 PM    I performed a history and physical examination of the patient and discussed management with the resident. I reviewed the residents note and agree with the documented findings and plan of care. Any areas of disagreement are noted on the chart. I was personally present for the key portions of any procedures. I have documented in the chart those procedures where I was not present during the key portions. I have reviewed the Podiatry Resident progress note.  I agree with the chief complaint, past medical history, past surgical history, allergies, medications, social and family history as documented unless otherwise noted below. Documentation of the HPI, Physical Exam and Medical Decision Making performed by medical students or scribes is based on my personal performance of the HPI, PE and MDM. I have personally evaluated this patient and have completed at least one if not all key elements of the E/M (history, physical exam, and MDM). Additional findings are as noted.      Electronically signed by Eileen Chacon DPM

## 2021-03-15 NOTE — ED PROVIDER NOTES
[facility] ED  Emergency Department Encounter  EmergencyMedicine Resident     Pt Name:John Colorado  MRN: [id number]  [place] 1970  Date of evaluation: 3/15/21  PCP:  Sunday Hill MD    [address] [place] [address]       Chief Complaint   Patient presents with    Shortness of Breath    Frostbite       HISTORY OF PRESENT ILLNESS  (Location/Symptom, Timing/Onset, Context/Setting, Quality, Duration, Modifying Factors, Severity.)      Wanda Sarah is a 44 y.o. male who presents with 3 months duration shortness of breath, sternal nonradiating chest pain that is exacerbated with coughing and chronic smoker's cough. Productive yellow sputum. Secondary chief complaint of frostbite. Says several weeks prior when it was colder patient noticed foot pain and black wounds on his feet. Has not seen medical attention for any of the above chief complaints. Does have a history of alcohol abuse Warnicke Korsakoff psychosis, hepatic encephalopathy hyperammonemia bipolar. Patient denies vomiting abdominal pain diarrhea. Smokes a pack a day, states he is not currently drinking despite recent hospital cancer intoxication, denies street drugs. PAST MEDICAL / SURGICAL / SOCIAL / FAMILY HISTORY      has a past medical history of Alcohol abuse, Anxiety, COPD (chronic obstructive pulmonary disease) ([facility] [address] COPD (chronic obstructive pulmonary disease) ([address] Depression, Head injury with loss of consciousness ([address] Headache, Hypertension, Liver disease, and Migraine. has a past surgical history that includes Ankle surgery (age 10) and Upper gastrointestinal endoscopy (N/A, 9/17/2019).     Social History     Socioeconomic History    Marital status: Single     Spouse name: Not on file    Number of children: Not on file    Years of education: Not on file    Highest education level: Not on file   Occupational History    Not on file   Social Needs    Financial resource strain: Not on file    Food insecurity Worry: Not on file     Inability: Not on file    Transportation needs     Medical: Not on file     Non-medical: Not on file   Tobacco Use    Smoking status: Current Every Day Smoker     Packs/day: 2.00    Smokeless tobacco: Never Used   Substance and Sexual Activity    Alcohol use: Not Currently     Comment: 12 beers daily    Drug use: Not Currently     Types: Marijuana    Sexual activity: Not on file   Lifestyle    Physical activity     Days per week: Not on file     Minutes per session: Not on file    Stress: Not on file   Relationships    Social connections     Talks on phone: Not on file     Gets together: Not on file     Attends Buddhism service: Not on file     Active member of club or organization: Not on file     Attends meetings of clubs or organizations: Not on file     Relationship status: Not on file    Intimate partner violence     Fear of current or ex partner: Not on file     Emotionally abused: Not on file     Physically abused: Not on file     Forced sexual activity: Not on file   Other Topics Concern    Not on file   Social History Narrative    Not on file       Family History   Problem Relation Age of Onset    High Blood Pressure Mother     Diabetes Mother        Allergies:  Patient has no known allergies. Home Medications:  Prior to Admission medications    Medication Sig Start Date End Date Taking?  Authorizing Provider   acetaminophen (TYLENOL) 325 MG tablet Take 2 tablets by mouth every 6 hours as needed for Pain 2/14/21   Friddie Maple Gruenbaum, DO   ibuprofen (IBU) 600 MG tablet Take 1 tablet by mouth every 6 hours as needed for Pain 2/14/21   [place] Maple Gruenbaum, DO   lidocaine (LIDODERM) 5 % Place 1 patch onto the skin daily 12 hours on, 12 hours off. 2/14/21   [place]lette Maple Gruenbaum, DO   benzocaine-menthol (CEPACOL) 15-4 MG LOZG lozenge Take 1 lozenge by mouth every 2 hours as needed for Sore Throat 2/14/21   [place]lette Maple Gruenbaum, DO   DULoxetine 40 MG CPEP Take 40 mg by mouth daily 6/16/20   Guido Kim MD   traZODone (DESYREL) 50 MG tablet Take 1 tablet by mouth nightly as needed for Sleep 6/15/20   Guido Kim MD   risperiDONE (RISPERDAL) 2 MG tablet Take 1 tablet by mouth 2 times daily 6/15/20   Guido Kim MD   metoprolol succinate (TOPROL XL) 25 MG extended release tablet Take 1 tablet by mouth daily 6/16/20   Guido Kim MD   folic acid (FOLVITE) 1 MG tablet Take 1 tablet by mouth daily 6/15/20   Guido Kim MD   pantoprazole (PROTONIX) 40 MG tablet Take 1 tablet by mouth daily 6/16/20   Guido Kim MD   vitamin B-1 (THIAMINE) 100 MG tablet Take 1 tablet by mouth daily 6/15/20   Guido Kim MD   loratadine (CLARITIN) 10 MG tablet Take 1 tablet by mouth daily 5/4/20   Sania Martel MD   meloxicam (MOBIC) 15 MG tablet Take 1 tablet by mouth daily as needed for Pain  Patient taking differently: Take 15 mg by mouth 2 times daily  5/4/20   Sania Martel MD   hydrOXYzine (ATARAX) 25 MG tablet Take 25 mg by mouth 3 times daily as needed for Itching    Historical Provider, MD   Multiple Vitamins-Minerals (THERAPEUTIC MULTIVITAMIN-MINERALS) tablet Take 1 tablet by mouth daily OTC with iron 11/27/19   KODY Mcfarland - CNP       REVIEW OF SYSTEMS    (2-9 systems for level 4, 10 or more for level 5)      Review of Systems   Constitutional: Negative for fever. HENT: Negative for congestion. Eyes: Negative for photophobia. Respiratory: Positive for shortness of breath. Cardiovascular:  positive for chest pain. Gastrointestinal: Negative for abdominal pain and vomiting. Endocrine: Negative for polyuria. Genitourinary: Negative for dysuria. Musculoskeletal: Negative for arthralgias. Skin: Positive for color change. Allergic/Immunologic: Negative for immunocompromised state. Neurological: Negative for dizziness. Hematological: Does not bruise/bleed easily. Psychiatric/Behavioral: Negative for agitation.      PHYSICAL EXAM   (up to 7 for level 4, 8 or more for level 5)      INITIAL VITALS:   BP (!) 151/88   Pulse 93   Temp 97.9 °F (36.6 °C) (Oral)   Resp 18   Ht 6' (1.829 m)   Wt 220 lb (99.8 kg)   SpO2 99%   BMI 29.84 kg/m²     Physical Exam  Constitutional:       General: Not in acute distress. Appearance: Normal appearance. Normal weight. Not toxic-appearing. HENT:      Head: Normocephalic and atraumatic. Nose: Nose normal.      Mouth/Throat: Mucous membranes are moist.  Uvula midline no oropharyngeal edema. Pharynx: Oropharynx is clear. Eyes:      Extraocular Movements: Extraocular movements intact. Conjunctiva/sclera: Conjunctivae normal.      Pupils: Pupils are equal, round, and reactive to light. Neck:      Musculoskeletal: Normal range of motion and neck supple. No neck rigidity. Cardiovascular:      Rate and Rhythm: Normal rate and regular rhythm. Pulses: Normal pulses. Heart sounds: Normal heart sounds. No murmur. Pulmonary:      Effort: Pulmonary effort is normal.      Breath sounds: Normal breath sounds. No wheezing. Abdominal:      General: Abdomen is flat. Bowel sounds are normal.      Tenderness: There is no abdominal tenderness. Musculoskeletal:     Normal range of motion. General: No swelling or tenderness. No LE edema    Skin:     General: Skin is warm. Black/dry gangrene see images to better characterize foot lesions     Capillary Refill: Capillary refill takes less than 2 seconds. Coloration: Skin is not jaundiced. Neurological:      General: No focal deficit present. Mental Status: Alert and oriented to person, place, and time. Mental status is at baseline. Motor: No weakness.        DIFFERENTIAL  DIAGNOSIS     PLAN (LABS / IMAGING / EKG):  Orders Placed This Encounter   Procedures    XR CHEST (2 VW)    CT CHEST PULMONARY EMBOLISM W CONTRAST    CT SOFT TISSUE NECK W CONTRAST    CBC Auto Differential    Basic Metabolic Panel w/ Reflex to MG  D-Dimer, Quantitative    Troponin    Troponin    Inpatient consult to Hospitalist    EKG 12 Lead    Insert peripheral IV       MEDICATIONS ORDERED:  Orders Placed This Encounter   Medications    iopamidol (ISOVUE-370) 76 % injection 85 mL    iopamidol (ISOVUE-370) 76 % injection 75 mL           DIAGNOSTIC RESULTS / EMERGENCY DEPARTMENT COURSE / MDM     LABS:  Results for orders placed or performed during the hospital encounter of 03/15/21   CBC Auto Differential   Result Value Ref Range    WBC 9.5 3.5 - 11.3 k/uL    RBC 4.32 4.21 - 5.77 m/uL    Hemoglobin 14.3 13.0 - 17.0 g/dL    Hematocrit 43.0 40.7 - 50.3 %    MCV 99.5 82.6 - 102.9 fL    MCH 33.1 25.2 - 33.5 pg    MCHC 33.3 28.4 - 34.8 g/dL    RDW 13.9 11.8 - 14.4 %    Platelets 795 050 - 244 k/uL    MPV 10.5 8.1 - 13.5 fL    NRBC Automated 0.0 0.0 per 100 WBC    Differential Type NOT REPORTED     Seg Neutrophils 71 (H) 36 - 65 %    Lymphocytes 12 (L) 24 - 43 %    Monocytes 9 3 - 12 %    Eosinophils % 6 (H) 1 - 4 %    Basophils 2 0 - 2 %    Immature Granulocytes 0 0 %    Segs Absolute 6.69 1.50 - 8.10 k/uL    Absolute Lymph # 1.17 1.10 - 3.70 k/uL    Absolute Mono # 0.86 0.10 - 1.20 k/uL    Absolute Eos # 0.57 (H) 0.00 - 0.44 k/uL    Basophils Absolute 0.16 0.00 - 0.20 k/uL    Absolute Immature Granulocyte 0.03 0.00 - 0.30 k/uL    WBC Morphology NOT REPORTED     RBC Morphology NOT REPORTED     Platelet Estimate NOT REPORTED    Basic Metabolic Panel w/ Reflex to MG   Result Value Ref Range    Glucose 81 70 - 99 mg/dL    BUN 11 6 - 20 mg/dL    CREATININE 0.60 (L) 0.70 - 1.20 mg/dL    Bun/Cre Ratio NOT REPORTED 9 - 20    Calcium 9.4 8.6 - 10.4 mg/dL    Sodium 138 135 - 144 mmol/L    Potassium 3.9 3.7 - 5.3 mmol/L    Chloride 100 98 - 107 mmol/L    CO2 27 20 - 31 mmol/L    Anion Gap 11 9 - 17 mmol/L    GFR Non-African American >60 >60 mL/min    GFR African American >60 >60 mL/min    GFR Comment          GFR Staging NOT REPORTED    D-Dimer, Quantitative Result Value Ref Range    D-Dimer, Quant 0.80 mg/L FEU   Troponin   Result Value Ref Range    Troponin, High Sensitivity 14 0 - 22 ng/L    Troponin T NOT REPORTED <0.03 ng/mL    Troponin Interp NOT REPORTED    Troponin   Result Value Ref Range    Troponin, High Sensitivity 11 0 - 22 ng/L    Troponin T NOT REPORTED <0.03 ng/mL    Troponin Interp NOT REPORTED          RADIOLOGY:  Xr Chest (2 Vw)    Result Date: 3/15/2021  EXAMINATION: TWO XRAY VIEWS OF THE CHEST 3/15/2021 10:30 am COMPARISON: 02/14/2021, 06/06/2020, 10/10/2019 HISTORY: ORDERING SYSTEM PROVIDED HISTORY: sternal chest pain sob TECHNOLOGIST PROVIDED HISTORY: sternal chest pain sob Acuity: Unknown Type of Exam: Unknown FINDINGS: The lungs are clear . There is no effusion or pneumothorax . The cardiomediastinal silhouette is within normal limits . No acute bony findings . No acute pulmonary process. Ct Soft Tissue Neck W Contrast    Result Date: 3/15/2021  EXAMINATION: CT OF THE NECK SOFT TISSUE WITH CONTRAST  3/15/2021 TECHNIQUE: CT of the neck was performed with the administration of intravenous contrast. Multiplanar reformatted images are provided for review. Dose modulation, iterative reconstruction, and/or weight based adjustment of the mA/kV was utilized to reduce the radiation dose to as low as reasonably achievable. COMPARISON: None HISTORY: ORDERING SYSTEM PROVIDED HISTORY: new neck mass partially seen on ct pe rule out TECHNOLOGIST PROVIDED HISTORY: new neck mass partially seen on ct pe rule out Decision Support Exception->Emergency Medical Condition (MA) Reason for Exam: new neck mass partially seen on ct pe rule out FINDINGS: PHARYNX/LARYNX:  Mildly enhancing mass is identified involving the left piriform recess measuring 3.3 cm in diameter. The largest of the lesions is irregular in appearance and is most suspicious for squamous cell carcinoma. There is possible invasion of the thyroid cartilage on the left.   The mass likely invades the superior portion of the left lobe of the thyroid gland and extends into the prevertebral space on the left side from C4 through C7. The mass laterally displaces the left carotid sheath. No airway narrowing is appreciated. SALIVARY GLANDS/THYROID:  The submandibular glands and parotid glands are unremarkable. Right lobe of the thyroid gland is intact. LYMPH NODES:  Pathologic left supraclavicular lymphadenopathy is identified involving level 3 and 4. The largest lymph node measures 2.4 cm in diameter. SOFT TISSUES:  No appreciable soft tissue swelling or mass is seen. BRAIN/ORBITS/SINUSES:  Severe right maxillary sinus disease identified. Left maxillary sinus polyps versus mucous retention cysts are noted. Mastoid air cells are well pneumatized LUNG APICES/SUPERIOR MEDIASTINUM:  No focal consolidation is seen within the visualized lung apices. No superior mediastinal lymphadenopathy or mass. The visualized portion of the trachea appears unremarkable. BONES:  No aggressive appearing lytic or blastic bony lesion. Old left clavicle fracture is noted. 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 The mass invades the superior margin of the right lobe of the thyroid gland and likely invasion of the left thyroid cartilage. The mass infiltrates along the left prevertebral space from C4 through C7. Left level 3 and level 4 jugular lymphadenopathy. Ct Chest Pulmonary Embolism W Contrast    Result Date: 3/15/2021  EXAMINATION: CTA OF THE CHEST 3/15/2021 2:01 pm TECHNIQUE: CTA of the chest was performed after the administration of intravenous contrast.  Multiplanar reformatted images are provided for review. MIP images are provided for review.  Dose modulation, iterative reconstruction, and/or weight based adjustment of the mA/kV was utilized to reduce the radiation dose to as low as reasonably

## 2021-03-15 NOTE — ED PROVIDER NOTES
[facility] HANDOFF       Handoff taken on the following patient from prior Attending Physician:  Pt Name: Garland Ferrara  PCP:  Tayler Pfeiffer MD    Attestation  I was available and discussed any additional care issues that arose and coordinated the management plans with the resident(s) caring for the patient during my duty period. Any areas of disagreement with resident's documentation of care or procedures are noted on the chart. I was personally present for the key portions of any/all procedures during my duty period. I have documented in the chart those procedures where I was not present during the key portions. CHIEF COMPLAINT       Chief Complaint   Patient presents with    Shortness of Breath    Frostbite         CURRENT MEDICATIONS     Previous Medications  Current Discharge Medication List      CONTINUE these medications which have NOT CHANGED    Details   acetaminophen (TYLENOL) 325 MG tablet Take 2 tablets by mouth every 6 hours as needed for Pain  Qty: 60 tablet, Refills: 0      ibuprofen (IBU) 600 MG tablet Take 1 tablet by mouth every 6 hours as needed for Pain  Qty: 60 tablet, Refills: 0      lidocaine (LIDODERM) 5 % Place 1 patch onto the skin daily 12 hours on, 12 hours off.   Qty: 10 patch, Refills: 0      benzocaine-menthol (CEPACOL) 15-4 MG LOZG lozenge Take 1 lozenge by mouth every 2 hours as needed for Sore Throat  Qty: 168 lozenge, Refills: 0      DULoxetine 40 MG CPEP Take 40 mg by mouth daily  Qty: 30 capsule, Refills: 3      traZODone (DESYREL) 50 MG tablet Take 1 tablet by mouth nightly as needed for Sleep  Qty: 30 tablet, Refills: 0      risperiDONE (RISPERDAL) 2 MG tablet Take 1 tablet by mouth 2 times daily  Qty: 60 tablet, Refills: 3      metoprolol succinate (TOPROL XL) 25 MG extended release tablet Take 1 tablet by mouth daily  Qty: 30 tablet, Refills: 3      folic acid (FOLVITE) 1 MG tablet Take 1 tablet by mouth daily  Qty: 30 tablet, Refills: 5      pantoprazole (PROTONIX) 40 MG tablet Take 1 tablet by mouth daily  Qty: 30 tablet, Refills: 3      vitamin B-1 (THIAMINE) 100 MG tablet Take 1 tablet by mouth daily  Qty: 30 tablet, Refills: 5    Associated Diagnoses: Wernicke-Korsakoff psychosis (HCC)      loratadine (CLARITIN) 10 MG tablet Take 1 tablet by mouth daily  Qty: 30 tablet, Refills: 0    Associated Diagnoses: Allergic rhinitis, unspecified seasonality, unspecified trigger      meloxicam (MOBIC) 15 MG tablet Take 1 tablet by mouth daily as needed for Pain  Qty: 30 tablet, Refills: 0    Associated Diagnoses: Musculoskeletal chest pain      hydrOXYzine (ATARAX) 25 MG tablet Take 25 mg by mouth 3 times daily as needed for Itching      Multiple Vitamins-Minerals (THERAPEUTIC MULTIVITAMIN-MINERALS) tablet Take 1 tablet by mouth daily OTC with iron  Qty: 30 tablet, Refills: 3    Associated Diagnoses: Seizure disorder (HCC)             Encounter Medications  Orders Placed This Encounter   Medications    iopamidol (ISOVUE-370) 76 % injection 85 mL    iopamidol (ISOVUE-370) 76 % injection 75 mL    sodium chloride flush 0.9 % injection 10 mL    sodium chloride flush 0.9 % injection 10 mL    enoxaparin (LOVENOX) injection 40 mg    OR Linked Order Group     promethazine (PHENERGAN) tablet 12.5 mg     ondansetron (ZOFRAN) injection 4 mg    polyethylene glycol (GLYCOLAX) packet 17 g    OR Linked Order Group     acetaminophen (TYLENOL) tablet 650 mg     acetaminophen (TYLENOL) suppository 650 mg    OR Linked Order Group     LORazepam (ATIVAN) tablet 1 mg     LORazepam (ATIVAN) injection 1 mg     LORazepam (ATIVAN) tablet 2 mg     LORazepam (ATIVAN) injection 2 mg     LORazepam (ATIVAN) tablet 3 mg     LORazepam (ATIVAN) injection 3 mg     LORazepam (ATIVAN) tablet 4 mg     LORazepam (ATIVAN) injection 4 mg       ALLERGIES     has No Known Allergies.       RECENT VITALS:   Temp: 98.8 °F (37.1 °C),  Pulse: 86, Resp: 16, BP: (!) 131/99    RADIOLOGY:   XR FOOT LEFT (MIN 3 VIEWS)   Final Result   Unremarkable left foot. 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 The   mass invades the superior margin of the right lobe of the thyroid gland and   likely invasion of the left thyroid cartilage. The mass infiltrates along   the left prevertebral space from C4 through C7. Left level 3 and level 4 jugular lymphadenopathy. CT CHEST PULMONARY EMBOLISM W CONTRAST   Final Result   No evidence of pulmonary embolism or acute pulmonary abnormality. .  Abnormal   appearance of the left side of the neck with enlarged lymph nodes as well as   findings of a mass anterior to the left carotid artery widening the distance   from the carotid artery in the hyoid bone on the left. This is incompletely visualized. Further evaluation with CT of the of the   neck is recommended. Findings were discussed with Zurdo Bryan at 2:43 pm on 3/15/2021. RECOMMENDATIONS:   CT neck for evaluation of left-sided mass thoracic inlet         XR CHEST (2 VW)   Final Result   No acute pulmonary process.          XR FOOT RIGHT (MIN 3 VIEWS)    (Results Pending)       LABS:  Labs Reviewed   CBC WITH AUTO DIFFERENTIAL - Abnormal; Notable for the following components:       Result Value    Seg Neutrophils 71 (*)     Lymphocytes 12 (*)     Eosinophils % 6 (*)     Absolute Eos # 0.57 (*)     All other components within normal limits   BASIC METABOLIC PANEL W/ REFLEX TO MG FOR LOW K - Abnormal; Notable for the following components:    CREATININE 0.60 (*)     All other components within normal limits   D-DIMER, QUANTITATIVE   TROPONIN   TROPONIN   SEDIMENTATION RATE   C-REACTIVE PROTEIN   URINE DRUG SCREEN   TOX SCR, COMPLETE BL   CBC WITH AUTO DIFFERENTIAL   BASIC METABOLIC PANEL W/ REFLEX TO MG FOR LOW K     Difficulty breathing, CTA negative for PE however and actively shows new neck mass likely cancer, plan is admission for oncology consultation none early work-up    PLAN/ TASKS OUTSTANDING     Admission, awaiting bed placement      (Please note that portions of this note were completed with a voice recognition program.  Efforts were made to edit the dictations but occasionally words are mis-transcribed. )    Carrillo MD, F.A.C.E.P.   Attending Emergency Physician        Raoul Blankenship MD  03/15/21 2162

## 2021-03-15 NOTE — ED NOTES
Pt presented to ED with complaints of SOB ongoing for several weeks which gets worse with cold air. Pt states productive cough with yellow sputum. Pt states at homeless shelter. Pt complains of frostbite to bilateral feet. Pt denies chest pain. Pt denies diff swallowing or breathing. Pt ambulated with steady gait. RR even and non labored.       Tarun Gambino RN  03/15/21 2730
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
## 2021-03-15 NOTE — ED PROVIDER NOTES
[facility]     Emergency Department     Faculty Attestation    I performed a history and physical examination of the patient and discussed management with the resident. I reviewed the residents note and agree with the documented findings and plan of care. Any areas of disagreement are noted on the chart. I was personally present for the key portions of any procedures. I have documented in the chart those procedures where I was not present during the key portions. I have reviewed the emergency nurses triage note. I agree with the chief complaint, past medical history, past surgical history, allergies, medications, social and family history as documented unless otherwise noted below. For Physician Assistant/ Nurse Practitioner cases/documentation I have personally evaluated this patient and have completed at least one if not all key elements of the E/M (history, physical exam, and MDM). Additional findings are as noted. Patient presents with shortness of breath that he says has been worsening over the past few days. He says he does have some chest pain when he takes deep breaths as well. He denies fever but says he has had a cough. He says that this is more chronic for him though because he does smoke. He says he did bring up some blood with a cough earlier today but is unsure if that is from the ulcer that he knows he has. He denies any abdominal pain. Patient also complains of pain and color change to his toes. He says he did have to sleep outside a couple of weeks ago during the very cold weather and has had the pain and color change since then. Patient is not diabetic. On exam, patient is resting comfortably in the bed. He is not respiratory distress. There is scattered wheeze heard on auscultation lungs bilaterally. Heart sounds are normal.  Abdomen is soft and nontender. There are areas of necrosis and erythema to the bilateral distal toes.   This is possibly from frostbite

## 2021-03-16 ENCOUNTER — APPOINTMENT (OUTPATIENT)
Dept: ULTRASOUND IMAGING | Age: 51
DRG: 098 | End: 2021-03-16
Payer: COMMERCIAL

## 2021-03-16 LAB
ABSOLUTE EOS #: 0.76 K/UL (ref 0–0.44)
ABSOLUTE IMMATURE GRANULOCYTE: 0.04 K/UL (ref 0–0.3)
ABSOLUTE LYMPH #: 1.01 K/UL (ref 1.1–3.7)
ABSOLUTE MONO #: 0.72 K/UL (ref 0.1–1.2)
ALBUMIN SERPL-MCNC: 3.5 G/DL (ref 3.5–5.2)
ALBUMIN/GLOBULIN RATIO: 1 (ref 1–2.5)
ALP BLD-CCNC: 113 U/L (ref 40–129)
ALT SERPL-CCNC: 16 U/L (ref 5–41)
ANION GAP SERPL CALCULATED.3IONS-SCNC: 12 MMOL/L (ref 9–17)
AST SERPL-CCNC: 24 U/L
BASOPHILS # BLD: 2 % (ref 0–2)
BASOPHILS ABSOLUTE: 0.14 K/UL (ref 0–0.2)
BILIRUB SERPL-MCNC: 0.62 MG/DL (ref 0.3–1.2)
BILIRUBIN DIRECT: 0.18 MG/DL
BILIRUBIN, INDIRECT: 0.44 MG/DL (ref 0–1)
BUN BLDV-MCNC: 13 MG/DL (ref 6–20)
BUN/CREAT BLD: ABNORMAL (ref 9–20)
CALCIUM SERPL-MCNC: 9.1 MG/DL (ref 8.6–10.4)
CHLORIDE BLD-SCNC: 102 MMOL/L (ref 98–107)
CO2: 23 MMOL/L (ref 20–31)
CREAT SERPL-MCNC: 0.59 MG/DL (ref 0.7–1.2)
DIFFERENTIAL TYPE: ABNORMAL
EKG ATRIAL RATE: 79 BPM
EKG P AXIS: 70 DEGREES
EKG P-R INTERVAL: 140 MS
EKG Q-T INTERVAL: 362 MS
EKG QRS DURATION: 76 MS
EKG QTC CALCULATION (BAZETT): 415 MS
EKG R AXIS: 27 DEGREES
EKG T AXIS: 47 DEGREES
EKG VENTRICULAR RATE: 79 BPM
EOSINOPHILS RELATIVE PERCENT: 9 % (ref 1–4)
GFR AFRICAN AMERICAN: >60 ML/MIN
GFR NON-AFRICAN AMERICAN: >60 ML/MIN
GFR SERPL CREATININE-BSD FRML MDRD: ABNORMAL ML/MIN/{1.73_M2}
GFR SERPL CREATININE-BSD FRML MDRD: ABNORMAL ML/MIN/{1.73_M2}
GLOBULIN: NORMAL G/DL (ref 1.5–3.8)
GLUCOSE BLD-MCNC: 84 MG/DL (ref 70–99)
HCT VFR BLD CALC: 41.2 % (ref 40.7–50.3)
HEMOGLOBIN: 13.6 G/DL (ref 13–17)
IMMATURE GRANULOCYTES: 1 %
LYMPHOCYTES # BLD: 12 % (ref 24–43)
MCH RBC QN AUTO: 33.2 PG (ref 25.2–33.5)
MCHC RBC AUTO-ENTMCNC: 33 G/DL (ref 28.4–34.8)
MCV RBC AUTO: 100.5 FL (ref 82.6–102.9)
MONOCYTES # BLD: 9 % (ref 3–12)
NRBC AUTOMATED: 0 PER 100 WBC
PDW BLD-RTO: 14.2 % (ref 11.8–14.4)
PLATELET # BLD: 209 K/UL (ref 138–453)
PLATELET ESTIMATE: ABNORMAL
PMV BLD AUTO: 10.3 FL (ref 8.1–13.5)
POTASSIUM SERPL-SCNC: 4.1 MMOL/L (ref 3.7–5.3)
RBC # BLD: 4.1 M/UL (ref 4.21–5.77)
RBC # BLD: ABNORMAL 10*6/UL
SEG NEUTROPHILS: 67 % (ref 36–65)
SEGMENTED NEUTROPHILS ABSOLUTE COUNT: 5.46 K/UL (ref 1.5–8.1)
SODIUM BLD-SCNC: 137 MMOL/L (ref 135–144)
TOTAL PROTEIN: 7 G/DL (ref 6.4–8.3)
WBC # BLD: 8.1 K/UL (ref 3.5–11.3)
WBC # BLD: ABNORMAL 10*3/UL

## 2021-03-16 PROCEDURE — 6360000002 HC RX W HCPCS: Performed by: NURSE PRACTITIONER

## 2021-03-16 PROCEDURE — 88172 CYTP DX EVAL FNA 1ST EA SITE: CPT

## 2021-03-16 PROCEDURE — 6370000000 HC RX 637 (ALT 250 FOR IP): Performed by: NURSE PRACTITIONER

## 2021-03-16 PROCEDURE — 88341 IMHCHEM/IMCYTCHM EA ADD ANTB: CPT

## 2021-03-16 PROCEDURE — 80048 BASIC METABOLIC PNL TOTAL CA: CPT

## 2021-03-16 PROCEDURE — 93010 ELECTROCARDIOGRAM REPORT: CPT | Performed by: INTERNAL MEDICINE

## 2021-03-16 PROCEDURE — 38505 NEEDLE BIOPSY LYMPH NODES: CPT

## 2021-03-16 PROCEDURE — 2580000003 HC RX 258: Performed by: NURSE PRACTITIONER

## 2021-03-16 PROCEDURE — 88173 CYTOPATH EVAL FNA REPORT: CPT

## 2021-03-16 PROCEDURE — G0378 HOSPITAL OBSERVATION PER HR: HCPCS

## 2021-03-16 PROCEDURE — 0CJS8ZZ INSPECTION OF LARYNX, VIA NATURAL OR ARTIFICIAL OPENING ENDOSCOPIC: ICD-10-PCS | Performed by: OTOLARYNGOLOGY

## 2021-03-16 PROCEDURE — 2709999900 US GUIDED NEEDLE PLACEMENT

## 2021-03-16 PROCEDURE — 80076 HEPATIC FUNCTION PANEL: CPT

## 2021-03-16 PROCEDURE — 07B23ZX EXCISION OF LEFT NECK LYMPHATIC, PERCUTANEOUS APPROACH, DIAGNOSTIC: ICD-10-PCS | Performed by: RADIOLOGY

## 2021-03-16 PROCEDURE — 2500000003 HC RX 250 WO HCPCS: Performed by: NURSE PRACTITIONER

## 2021-03-16 PROCEDURE — 99254 IP/OBS CNSLTJ NEW/EST MOD 60: CPT | Performed by: INTERNAL MEDICINE

## 2021-03-16 PROCEDURE — 88342 IMHCHEM/IMCYTCHM 1ST ANTB: CPT

## 2021-03-16 PROCEDURE — 85025 COMPLETE CBC W/AUTO DIFF WBC: CPT

## 2021-03-16 PROCEDURE — APPSS180 APP SPLIT SHARED TIME > 60 MINUTES: Performed by: NURSE PRACTITIONER

## 2021-03-16 PROCEDURE — 99225 PR SBSQ OBSERVATION CARE/DAY 25 MINUTES: CPT | Performed by: INTERNAL MEDICINE

## 2021-03-16 PROCEDURE — 88305 TISSUE EXAM BY PATHOLOGIST: CPT

## 2021-03-16 PROCEDURE — 36415 COLL VENOUS BLD VENIPUNCTURE: CPT

## 2021-03-16 PROCEDURE — 88333 PATH CONSLTJ SURG CYTO XM 1: CPT

## 2021-03-16 RX ORDER — M-VIT,TX,IRON,MINS/CALC/FOLIC 27MG-0.4MG
1 TABLET ORAL DAILY
Status: DISCONTINUED | OUTPATIENT
Start: 2021-03-16 | End: 2021-03-17 | Stop reason: HOSPADM

## 2021-03-16 RX ORDER — FAMOTIDINE 20 MG/1
20 TABLET, FILM COATED ORAL DAILY
Status: DISCONTINUED | OUTPATIENT
Start: 2021-03-16 | End: 2021-03-17 | Stop reason: HOSPADM

## 2021-03-16 RX ORDER — ACETAMINOPHEN 650 MG
TABLET, EXTENDED RELEASE ORAL PRN
Status: DISCONTINUED | OUTPATIENT
Start: 2021-03-16 | End: 2021-03-17 | Stop reason: HOSPADM

## 2021-03-16 RX ORDER — CHLORDIAZEPOXIDE HYDROCHLORIDE 5 MG/1
5 CAPSULE, GELATIN COATED ORAL 3 TIMES DAILY
Status: DISCONTINUED | OUTPATIENT
Start: 2021-03-16 | End: 2021-03-17 | Stop reason: HOSPADM

## 2021-03-16 RX ADMIN — FAMOTIDINE 20 MG: 20 TABLET, FILM COATED ORAL at 09:53

## 2021-03-16 RX ADMIN — THIAMINE HYDROCHLORIDE: 100 INJECTION, SOLUTION INTRAMUSCULAR; INTRAVENOUS at 09:54

## 2021-03-16 RX ADMIN — SODIUM CHLORIDE, PRESERVATIVE FREE 10 ML: 5 INJECTION INTRAVENOUS at 09:54

## 2021-03-16 RX ADMIN — ENOXAPARIN SODIUM 40 MG: 40 INJECTION SUBCUTANEOUS at 09:53

## 2021-03-16 RX ADMIN — CHLORDIAZEPOXIDE HYDROCHLORIDE 5 MG: 5 CAPSULE ORAL at 21:22

## 2021-03-16 RX ADMIN — Medication 1 TABLET: at 09:53

## 2021-03-16 ASSESSMENT — PAIN SCALES - GENERAL
PAINLEVEL_OUTOF10: 2
PAINLEVEL_OUTOF10: 8

## 2021-03-16 ASSESSMENT — PAIN DESCRIPTION - LOCATION
LOCATION: FOOT
LOCATION: FOOT

## 2021-03-16 ASSESSMENT — PAIN DESCRIPTION - PAIN TYPE
TYPE: ACUTE PAIN
TYPE: ACUTE PAIN

## 2021-03-16 ASSESSMENT — PAIN DESCRIPTION - DESCRIPTORS
DESCRIPTORS: ACHING
DESCRIPTORS: ACHING

## 2021-03-16 ASSESSMENT — PAIN DESCRIPTION - ORIENTATION
ORIENTATION: LEFT;RIGHT
ORIENTATION: RIGHT;LEFT

## 2021-03-16 ASSESSMENT — PAIN DESCRIPTION - FREQUENCY: FREQUENCY: CONTINUOUS

## 2021-03-16 NOTE — CONSULTS
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  And left pre-vertebral space invasion waqs also noted from C4-C7  -Tissue diagnosis needs to be made here  -I recommended we proceed with tracheostomy to stabilize the airway and direct laryngoscopy with biopsy  -Another option is an US guided FNA per I.R.  -The patient is not agreeable to a trach at this time and therefore not safe to be intubated for a general anesthetic  -Consult IR to get a tissue diagnosis from the left cervical adenopathy  -Consult Radiation and Medical oncology  -He will also need a PET scan eventually      CHIEF   COMPLAINT:      Trouble swallowing and SOB    CONSULTING PHYSICIAN:  Dr. Rosa Abbott:                            Carina Quinn      is   a   44 y.o.   male   with   significant   past   medical   history   of   Alcohol and tobacco abuse who presents with 3 months duration shortness of breath, sternal nonradiating chest pain that is exacerbated with coughing.   Productive yellow sputum and hemoptysis for months.  Secondary chief complaint of frostbite.  Says several weeks prior when it was colder patient noticed foot pain and black wounds on his feet.  Has not seen medical attention for any of the above chief complaints.  Does have a history of alcohol abuse Warnicke Korsakoff psychosis, hepatic encephalopathy hyperammonemia bipolar.  Patient denies vomiting abdominal pain diarrhea.  Smokes a pack a day, states he is not currently drinking despite recent hospital cancer intoxication, denies street drugs. He denies any weight loss or labored breathing. He does admit to some dysphagia that has been progressive. Patient acknowledges that he is a heavy drinker, He had been drinking up to 30 beers a day. He was recently admitted to a in-house rehab program for alcohol detox.  He states he began drinking while he was a resident at the facility and he was thrown out of the house    Past   Medical   History:       Diagnosis Date    Alcohol abuse     Anxiety     [facility]     [facility]     Depression     [facility] loss of consciousness [facility]     Headache     Hypertension     Liver disease     Migraine           Surgical   History:       Procedure Laterality Date    ANKLE SURGERY  age 10    left    UPPER GASTROINTESTINAL ENDOSCOPY N/A 9/17/2019    EGD BIOPSY performed by Michelle Rivero MD at [facility]          Medications: Current Facility-Administered Medications: povidone-iodine (BETADINE) 10 % external solution, , Topical, PRN  famotidine (PEPCID) tablet 20 mg, 20 mg, Oral, Daily  sodium chloride 0.9 % 6,720 mL with folic acid 1 mg, adult multi-vitamin with vitamin k 10 mL, thiamine 100 mg, , Intravenous, Once  folic acid 1 mg in dextrose 5 % 50 mL IVPB, 1 mg, Intravenous, Daily  therapeutic multivitamin-minerals 1 tablet, 1 tablet, Oral, Daily  sodium chloride flush 0.9 % injection 10 mL, 10 mL, Intravenous, 2 times per day  sodium chloride flush 0.9 % injection 10 mL, 10 mL, Intravenous, PRN  enoxaparin (LOVENOX) injection 40 mg, 40 mg, Subcutaneous, Daily  promethazine (PHENERGAN) tablet 12.5 mg, 12.5 mg, Oral, Q6H PRN **OR** ondansetron (ZOFRAN) injection 4 mg, 4 mg, Intravenous, Q6H PRN  polyethylene glycol (GLYCOLAX) packet 17 g, 17 g, Oral, Daily PRN  acetaminophen (TYLENOL) tablet 650 mg, 650 mg, Oral, Q6H PRN **OR** acetaminophen (TYLENOL) suppository 650 mg, 650 mg, Rectal, Q6H PRN  LORazepam (ATIVAN) tablet 1 mg, 1 mg, Oral, Q1H PRN **OR** LORazepam (ATIVAN) injection 1 mg, 1 mg, Intravenous, Q1H PRN **OR** LORazepam (ATIVAN) tablet 2 mg, 2 mg, Oral, Q1H PRN **OR** LORazepam (ATIVAN) injection 2 mg, 2 mg, Intravenous, Q1H PRN **OR** LORazepam (ATIVAN) tablet 3 mg, 3 mg, Oral, Q1H PRN **OR** LORazepam (ATIVAN) injection 3 mg, 3 mg, Intravenous, Q1H PRN **OR** LORazepam (ATIVAN) tablet 4 mg, 4 mg, Oral, Q1H PRN **OR** LORazepam (ATIVAN) injection 4 mg, 4 mg, Intravenous, Q1H PRN     Allergies:      Patient has no known allergies.     Social History:    Social History     Socioeconomic History    Marital status: Single     Spouse name: None    Number of children: None    Years of education: None    Highest education level: None   Occupational History    None   Social Needs    Financial resource strain: None    Food insecurity     Worry: None     Inability: None    Transportation needs     Medical: None     Non-medical: None   Tobacco Use    Smoking status: Current Every Day Smoker     Packs/day: 2.00    Smokeless tobacco: Never Used   Substance and Sexual Activity    Alcohol use: Not Currently     Comment: 12 beers daily    Drug use: Not Currently     Types: Marijuana    Sexual activity: None   Lifestyle    Physical activity     Days per week: None     Minutes per session: None    Stress: None   Relationships    Social connections     Talks on phone: None     Gets together: None     Attends Sikhism service: None     Active member of club or organization: None     Attends meetings of clubs or organizations: None     Relationship status: None    Intimate partner violence     Fear of current or ex partner: None     Emotionally abused: None     Physically abused: None     Forced sexual activity: None   Other Topics Concern    None   Social History Narrative    None       Family   History:       Problem Relation Age of Onset    High Blood Pressure left prevertebral space from C4 through C7. Left level 3 and level 4 jugular lymphadenopathy. EXAM:   VITALS:      /69   Pulse 73   Temp 98.5 °F (36.9 °C) (Oral)   Resp 16   Ht 6' (1.829 m)   Wt 220 lb (99.8 kg)   SpO2 98%   BMI 29.84 kg/m²     CONSTITUTIONAL:      awake,   alert,   cooperative,   no   apparent   distress,   and   appears   stated   age   No labored breathing, no stridor,hoarse voice  EYES:      Lids   and   lashes   normal,   pupils   equal,   round   and   reactive   to   light,   extra   ocular   muscles intact,   sclera   clear,   conjunctiva   Normal  HEAD:      Normocephalic,   without   obvious   abnormality,   atraumatic,   sinuses   nontender   on   palpation,   EARS:external   ears   without   lesions,   Minimal wax noted  NOSE: patent nasal airway, no bleeding noted, no lesions, left DNS  ORAL: oral cavity &  pharynx   with   moist   mucus   membranes,   tonsils   without erythema   or   exudates,   no fluctuance, uvula midline, gums   normal   and   good   Dentition. The floor of mouth is soft without induration, the tongue base is soft as well. NECK:      Supple,   symmetrical,   trachea   Slightly to right, left firm level 3 adenopathy (at least two nodes about 2 cm each),   thyroid   symmetric,   not   enlarged and   no   tenderness,   skin   Normal  CHEST: equal expansion and symmetric, lungs CTA  CV: Heart RRR  MUSCULOSKELETAL:      There   is   no   redness,   warmth,   or   swelling   of   the   joints. Full   range   of motion   noted. Motor   strength   is   5   out   of   5   all   extremities   bilaterally. Tone   is   normal.   NEUROLOGIC:      Awake,   alert,   oriented   to   name,   place   and   time. Cranial   nerves   II-XII   are grossly   intact. Motor   is   5   out   of   5   bilaterally. Sensory   is   Intact.   PSYCH: Normal Mood and affect  SKIN: Normal turgor, no rash    PROCEDURE NOTE:    Pre-Op Diagnosis: Hoarseness    Post-op Diagnosis: same    Procedure : Flexible fiberoptic Laryngoscopy    Indications: based on the diagnoses above, it was recommended that this patient undergo a flexible laryngoscopy. Risks, benefits and alternatives discussed, and verbal consent obtained. Procedure: A flexible laryngoscope was inserted into the more patent nostril and passed through the nasal passage and nasopharynx to visualize the oropharynx, hypopharynx, and larynx in detail. The structures examined included the vocal cords, epiglottis, tongue base, hypopharynx and larynx. The laryngeal airway appeared to be deviated to the right from a left hypopharyngeal piriform sinus mass. The mass obliterates the left piriform sinus. The vocal cords appeared to be mobile but the glottic airway is narrowed from left external compression. Minimal edema noted. Patient tolerated procedure well without any complications.     Electronically   signed   by   Fallon Sierra MD   on   3/16/2021   at   9:18 AM

## 2021-03-16 NOTE — PLAN OF CARE
Problem: Pain:  Goal: Pain level will decrease  Description: Pain level will decrease  3/16/2021 1743 by Sumanth Bill RN  Outcome: Ongoing  3/16/2021 0509 by Roni Daly RN  Outcome: Ongoing  Goal: Control of acute pain  Description: Control of acute pain  3/16/2021 1743 by Sumanth Bill RN  Outcome: Ongoing  3/16/2021 0509 by Roni Daly RN  Outcome: Ongoing  Goal: Control of chronic pain  Description: Control of chronic pain  3/16/2021 1743 by Sumanth Bill RN  Outcome: Ongoing  3/16/2021 0509 by Roni Daly RN  Outcome: Ongoing     Problem: SKIN INTEGRITY  Goal: LTG - Patient will be free from infection  3/16/2021 1743 by Sumanth Bill RN  Outcome: Ongoing  3/16/2021 0509 by Roni Daly RN  Outcome: Ongoing  Goal: LTG - patient will maintain/improve skin integrity through proper skin care techniques  3/16/2021 1743 by Sumanth Bill RN  Outcome: Ongoing  3/16/2021 0509 by Roni Daly RN  Outcome: Ongoing  Goal: LTG - Patient will demonstrate appropriate pressure relief techniques  3/16/2021 1743 by Sumanth Bill RN  Outcome: Ongoing  3/16/2021 0509 by Roni Daly RN  Outcome: Ongoing  Goal: LTG - patient will demonstrate appropriate skin care techniques  3/16/2021 1743 by Sumanth Bill RN  Outcome: Ongoing  3/16/2021 0509 by Roni Daly RN  Outcome: Ongoing  Goal: LTG - Patient will be free from infection  3/16/2021 1743 by Sumanth Bill RN  Outcome: Ongoing  3/16/2021 0509 by Roni Daly RN  Outcome: Ongoing  Goal: STG - Patient demonstrates skin care/treatment/dressing change  3/16/2021 1743 by Sumanth Bill RN  Outcome: Ongoing  3/16/2021 0509 by Roni Daly RN  Outcome: Ongoing  Goal: STG - patient will maintain good skin integrity  3/16/2021 1743 by Sumanth Bill RN  Outcome: Ongoing  3/16/2021 0509 by Roni Daly RN  Outcome: Ongoing  Goal: STG - Patient exhibits signs of wound healing.   3/16/2021 1743 by Sumanth Bill RN  Outcome: Ongoing  3/16/2021 0509 by Roni Daly

## 2021-03-16 NOTE — PROGRESS NOTES
[facility]  Office: [address], DO, Ana Adams, DO, Sherry Andino, DO, Moe Maddox Arlette, DO, Warren Etienne MD, Shakira Brice MD, Paulino Lazcano MD, Live Nguyen MD, Darlene Allen MD, Savanna Kline MD, Norah Lara MD, Gianna Ly MD, Lavon Thomas MD, Monie Solorio DO, Joana Valentine MD, Vic Michelle DO, Tomi Piña MD,  Lynda Gustafson DO, Angy Rai MD, Gino Rodríguez MD, [facility], [facility], [facility], CNP, Darron Cano, CNP, Robert Rivera, CNS, Jose Maria Rodriguez, Carmina Ulrich, CNP, Thomas Mason, CNP, Milo Martel, CNP, Viola Louie, CNP, Christine Gibson PA-C, Be Malin, [facility], Rachana Maza, CNP, Caryn Brown, CNP, Gab Pereira, CNP, Linh Jessica, CNP, Jed Pace, [facility], Kelsey Ace, [address] [place] [address]    Progress Note    3/16/2021    10:44 AM    Name:   Carol Mitchell  MRN:     [id number]     Acct:      [de-identified]   Room:   [address] [place] [address] Day:  0  Admit Date:  3/15/2021  1:01 PM    PCP:   Nisa Emmanuel MD  Code Status:  Full Code    Subjective:     C/C:   Chief Complaint   Patient presents with    Shortness of Breath    Frostbite     Interval History Status: worsened. Patient evaluated in room sitting in bed  Afebrile overnight, VSS, on room air  No documented I/os  ENT/podiatry evaluating patient    Brief History:     Rosa Maria Deter a 44 y. o. male who presents with 3 months duration shortness of breath, sternal nonradiating chest pain that is exacerbated with coughing and chronic smoker's cough.  Productive yellow sputum.  Secondary chief complaint of frostbite.  Says several weeks prior when it was colder patient noticed foot pain and black wounds on his feet.  Has not seen medical attention for any of the above chief complaints.  Does have a history of alcohol abuse Warnicke Korsakoff psychosis, hepatic encephalopathy hyperammonemia bipolar.  Patient denies vomiting abdominal pain diarrhea.  Smokes a pack a day, states he is not currently drinking despite recent hospital cancer intoxication, denies street drugs. \"      52-year-old male admitted through the emergency room  The patient has had a chronic cough which he attributes to smoking  Over the last month he has developed increasing pain in the anterior chest with his cough  The patient reports he has been smoking since age 7    Review of Systems:     Constitutional:  negative for chills, fevers, sweats, + BLE pain   Respiratory: + cough, dyspnea on exertion, shortness of breath, wheezing, + voice change   Cardiovascular:  + reproducible midsternal nonradiating chest pain, -chest pressure/discomfort, lower extremity edema, palpitations  Gastrointestinal:  negative for abdominal pain, constipation, diarrhea, nausea, vomiting  Neurological:  negative for dizziness, headache    Medications: Allergies:  No Known Allergies    Current Meds:   Scheduled Meds:    famotidine  20 mg Oral Daily    therapeutic multivitamin-minerals  1 tablet Oral Daily    [START ON 6/48/7719] folic acid IVPB  1 mg Intravenous Daily    sodium chloride flush  10 mL Intravenous 2 times per day    enoxaparin  40 mg Subcutaneous Daily     Continuous Infusions:   PRN Meds: povidone-iodine, sodium chloride flush, promethazine **OR** ondansetron, polyethylene glycol, acetaminophen **OR** acetaminophen, LORazepam **OR** LORazepam **OR** LORazepam **OR** LORazepam **OR** LORazepam **OR** LORazepam **OR** LORazepam **OR** LORazepam    Data:     Past Medical History:   has a past medical history of Alcohol abuse, Anxiety, COPD (chronic obstructive pulmonary disease) ([facility] [address] COPD (chronic obstructive pulmonary disease) ([facility] [address] Depression, Head injury with loss of consciousness ([facility] [address] Headache, Hypertension, Liver disease, and Migraine. Social History:   reports that he has been smoking. He has been smoking about 2.00 packs per day.  He has never used smokeless tobacco. He reports previous alcohol use. He reports previous drug use. Drug: Marijuana. Family History:   Family History   Problem Relation Age of Onset    High Blood Pressure Mother     Diabetes Mother        Vitals:  /69   Pulse 73   Temp 98.5 °F (36.9 °C) (Oral)   Resp 16   Ht 6' (1.829 m)   Wt 220 lb (99.8 kg)   SpO2 98%   BMI 29.84 kg/m²   Temp (24hrs), Av.4 °F (36.9 °C), Min:97.9 °F (36.6 °C), Max:98.8 °F (37.1 °C)    No results for input(s): POCGLU in the last 72 hours. I/O (24Hr): No intake or output data in the 24 hours ending 21 1044    Labs:  Hematology:  Recent Labs     03/15/21  1317 03/15/21  1932 21  0555   WBC 9.5  --  8.1   RBC 4.32  --  4.10*   HGB 14.3  --  13.6   HCT 43.0  --  41.2   MCV 99.5  --  100.5   MCH 33.1  --  33.2   MCHC 33.3  --  33.0   RDW 13.9  --  14.2     --  209   MPV 10.5  --  10.3   SEDRATE  --  51*  --    CRP  --  17.7*  --    DDIMER 0.80  --   --      Chemistry:  Recent Labs     03/15/21  1317 03/15/21  1538 21  0555     --  137   K 3.9  --  4.1     --  102   CO2 27  --  23   GLUCOSE 81  --  84   BUN 11  --  13   CREATININE 0.60*  --  0.59*   ANIONGAP 11  --  12   LABGLOM >60  --  >60   GFRAA >60  --  >60   CALCIUM 9.4  --  9.1   TROPHS 14 11  --      Recent Labs     21  0555   PROT 7.0   LABALBU 3.5   AST 24   ALT 16   ALKPHOS 113   BILITOT 0.62   BILIDIR 0.18     ABG:No results found for: POCPH, PHART, PH, POCPCO2, FQY4OAH, PCO2, POCPO2, PO2ART, PO2, POCHCO3, LRF0DAO, HCO3, NBEA, PBEA, BEART, BE, THGBART, THB, KFY6IQJ, JXSU7NKT, P8NWJERX, O2SAT, FIO2  Lab Results   Component Value Date/Time    SPECIAL NOT REPORTED 2019 07:25 PM     No results found for: CULTURE    Radiology:  Xr Chest (2 Vw)    Result Date: 3/15/2021  No acute pulmonary process. Xr Foot Left (min 3 Views)    Result Date: 3/15/2021  Unremarkable left foot.      Xr Foot Right (min 3 Views)    Result Date: 3/15/2021  No acute bony abnormality with soft tissue irregularity of the 4th and 5th toes. Remote posttraumatic changes at the 3rd metatarsal neck and tibiotalar joint. 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 The mass invades the superior margin of the right lobe of the thyroid gland and likely invasion of the left thyroid cartilage. The mass infiltrates along the left prevertebral space from C4 through C7. Left level 3 and level 4 jugular lymphadenopathy. Ct Chest Pulmonary Embolism W Contrast    Result Date: 3/15/2021  No evidence of pulmonary embolism or acute pulmonary abnormality. .  Abnormal appearance of the left side of the neck with enlarged lymph nodes as well as findings of a mass anterior to the left carotid artery widening the distance from the carotid artery in the hyoid bone on the left. This is incompletely visualized. Further evaluation with CT of the of the neck is recommended. Findings were discussed with Russ Vazquez at 2:43 pm on 3/15/2021.  RECOMMENDATIONS: CT neck for evaluation of left-sided mass thoracic inlet       Physical Examination:        General appearance:  alert, cooperative and no distress  Mental Status:  oriented to person, place and time and normal affect  Lungs:  clear to auscultation bilaterally, normal effort  Heart:  regular rate and rhythm, no murmur  Abdomen:  soft, nontender, nondistended, normal bowel sounds, no masses, hepatomegaly, splenomegaly  Extremities:  no edema, redness, tenderness in the calves, pulses +2 in all extremities   Skin: Dry gangrene involving bilateral lower extremities, mid toe not affected on wither foot     Assessment:        Hospital Problems           Last Modified POA    * (Principal) Alcohol use disorder, severe, dependence ([facility] [facility] 3/15/2021 Yes    Hypertension 3/15/2021

## 2021-03-16 NOTE — FLOWSHEET NOTE
Wound care:    Patients toes and feet were wrapped per order listed. Betadine soaked 4x4 were placed in the webbing of feet. Kerlix wrapped up leg past ankle, then Ace wrap applied. Patient tolerated well. Toes had brisk refill after dressing application.

## 2021-03-16 NOTE — BRIEF OP NOTE
Brief Postoperative Note    Leena Norman  YOB: 1970  [id number]    Pre-operative Diagnosis: neck mass    Post-operative Diagnosis: Same    Procedure: Neck mass bx    Anesthesia: Local    Surgeons/Assistants: Cristobal    Estimated Blood Loss: less than 50     Complications: None    Specimens: Was Obtained:     Findings: Technically successful bx of left neck lymph node.   3 FNA and 2 18 gauge core biopsies were obtained and given to the onsite pathologist.      Electronically signed by ADRYAN Villavicencio on 3/16/2021 at 3:10 PM

## 2021-03-16 NOTE — PLAN OF CARE
Problem: Pain:  Goal: Pain level will decrease  Description: Pain level will decrease  Outcome: Ongoing  Goal: Control of acute pain  Description: Control of acute pain  Outcome: Ongoing  Goal: Control of chronic pain  Description: Control of chronic pain  Outcome: Ongoing     Problem: SKIN INTEGRITY  Goal: LTG - Patient will be free from infection  Outcome: Ongoing  Goal: LTG - patient will maintain/improve skin integrity through proper skin care techniques  Outcome: Ongoing  Goal: LTG - Patient will demonstrate appropriate pressure relief techniques  Outcome: Ongoing  Goal: LTG - patient will demonstrate appropriate skin care techniques  Outcome: Ongoing  Goal: LTG - Patient will be free from infection  Outcome: Ongoing  Goal: STG - Patient demonstrates skin care/treatment/dressing change  Outcome: Ongoing  Goal: STG - patient will maintain good skin integrity  Outcome: Ongoing  Goal: STG - Patient exhibits signs of wound healing.   Outcome: Ongoing  Goal: STG - patient demonstrates pressure reduction techniques  Outcome: Ongoing  Goal: STG - Patient demonstrates preventative skin care measures  Outcome: Ongoing

## 2021-03-16 NOTE — CONSULTS
Today's Date: .03/17/21  Patient Name: Kishor Allen  Date of admission: 3/15/2021  1:01 PM  Patient's age: 44 y.o., 1970  Admission Dx: Neck mass [R22.1]    Reason for Consult: likely metastatic squamous cell cancer  Requesting Physician: Alissa Aguayo DO    CHIEF COMPLAINT:    Chief Complaint   Patient presents with    Shortness of Breath    Frostbite     History Obtained From:  patient and chart    HISTORY OF PRESENT ILLNESS:      Kishor Allen  is a 44 y.o.  male with history of alcohol abuse, depression, COPD, who is admitted to the hospital for SOB. 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 Patient seen by ENT  Today he had neck LN biopsy and results  Showed  Surgical Pathology [[id number]]    Collected: 03/16/21 1533    Updated: 03/17/21 1457     Surgical Pathology Report --    -- Diagnosis --   FINE NEEDLE ASPIRATION, LEFT NECK LYMPH NODE:          POSITIVE FOR MALIGNANCY. METASTATIC BASALOID SQUAMOUS CELL   CARCINOMA. COMMENT: SEE CORRESPONDING BIOPSY, [id number].                Oncology consulted for further evaluation. Past Medical History:   has a past medical history of Alcohol abuse, Anxiety, COPD (chronic obstructive pulmonary disease) ([address] COPD (chronic obstructive pulmonary disease) ([address] Depression, Head injury with loss of consciousness ([address] Headache, Hypertension, Liver disease, and Migraine. Past Surgical History:   has a past surgical history that includes Ankle surgery (age 10) and Upper gastrointestinal endoscopy (N/A, 9/17/2019). Medications:    Prior to Admission medications    Medication Sig Start Date End Date Taking?  Authorizing Provider   acetaminophen (TYLENOL) 325 MG tablet Take 2 tablets by mouth every 6 hours as needed for Pain 2/14/21   Genevieve Reyes DO   ibuprofen (IBU) 600 MG tablet Take 1 tablet by mouth every 6 hours as needed for Pain 2/14/21   Lisa Reyes DO   lidocaine (LIDODERM) 5 % Place 1 patch onto the skin daily 12 hours on, 12 hours off. 2/14/21   Lisa Reyes DO   benzocaine-menthol (CEPACOL) 15-4 MG LOZG lozenge Take 1 lozenge by mouth every 2 hours as needed for Sore Throat 2/14/21   Lisa Reyes DO   DULoxetine 40 MG CPEP Take 40 mg by mouth daily 6/16/20   Lucius Gillette MD   traZODone (DESYREL) 50 MG tablet Take 1 tablet by mouth nightly as needed for Sleep 6/15/20   Lucius Gillette MD   risperiDONE (RISPERDAL) 2 MG tablet Take 1 tablet by mouth 2 times daily 6/15/20   Lucius Gillette MD   metoprolol succinate (TOPROL XL) 25 MG extended release tablet Take 1 tablet by mouth daily 6/16/20   Lucius Gillette MD   folic acid (FOLVITE) 1 MG tablet Take 1 tablet by mouth daily 6/15/20   Lucius Gillette MD   pantoprazole (PROTONIX) 40 MG tablet Take 1 tablet by mouth daily 6/16/20   Lucius Gillette MD   vitamin B-1 (THIAMINE) 100 MG tablet Take 1 tablet by mouth daily 6/15/20   Lucius Gillette MD   loratadine (CLARITIN) 10 MG tablet Take 1 tablet by mouth daily 5/4/20   Merissa Harley MD   meloxicam (MOBIC) 15 MG tablet Take 1 tablet by mouth daily as needed for Pain  Patient taking differently: Take 15 mg by mouth 2 times daily  5/4/20   Merissa Harley MD   hydrOXYzine (ATARAX) 25 MG tablet Take 25 mg by mouth 3 times daily as needed for Itching    Historical Provider, MD   Multiple Vitamins-Minerals (THERAPEUTIC MULTIVITAMIN-MINERALS) tablet Take 1 tablet by mouth daily OTC with iron 11/27/19   Olya Petty APRN - CNP     Current Facility-Administered Medications   Medication Dose Route Frequency Provider Last Rate Last Admin    povidone-iodine (BETADINE) 10 % external solution   Topical PRN Yosef Garcia DPM        famotidine (PEPCID) tablet 20 mg  20 mg Oral Daily KODY Bob NP   20 mg at 03/16/21 2636    therapeutic multivitamin-minerals 1 tablet  1 tablet Oral Daily Nilam Cavazos, KODY - NP   1 tablet at 03/16/21 0953    [START ON 2/10/8955] folic acid 1 mg in dextrose 5 % 50 mL IVPB  1 mg Intravenous Daily Joaquim EssexKODY - NP        sodium chloride flush 0.9 % injection 10 mL  10 mL Intravenous 2 times per day Nieves Wilder APRN - NP   10 mL at 03/16/21 0954    sodium chloride flush 0.9 % injection 10 mL  10 mL Intravenous PRN KODY Shaw - AAMIR        enoxaparin (LOVENOX) injection 40 mg  40 mg Subcutaneous Daily KODY Shaw - NP   40 mg at 03/16/21 0953    promethazine (PHENERGAN) tablet 12.5 mg  12.5 mg Oral Q6H PRN KODY Shaw NP        Or    ondansetron [facility]) injection 4 mg  4 mg Intravenous Q6H PRN KODY Shaw - AAMIR        polyethylene glycol (GLYCOLAX) packet 17 g  17 g Oral Daily PRN KODY Shaw NP        acetaminophen (TYLENOL) tablet 650 mg  650 mg Oral Q6H PRN KODY Shaw - AAMIR        Or   [facility] acetaminophen (TYLENOL) suppository 650 mg  650 mg Rectal Q6H PRN KODY Shaw NP        LORazepam (ATIVAN) tablet 1 mg  1 mg Oral Q1H PRN KODY Shaw - NP        Or    LORazepam (ATIVAN) injection 1 mg  1 mg Intravenous Q1H PRN KODY Shaw - AAMIR        Or    LORazepam (ATIVAN) tablet 2 mg  2 mg Oral Q1H PRN Nieves Wilder APRN - NP        Or    LORazepam (ATIVAN) injection 2 mg  2 mg Intravenous Q1H PRN KODY Shaw - AAMIR        Or    LORazepam (ATIVAN) tablet 3 mg  3 mg Oral Q1H PRN Nieves Wilder APRN - NP        Or    LORazepam (ATIVAN) injection 3 mg  3 mg Intravenous Q1H PRN KODY Shaw - NP        Or    LORazepam (ATIVAN) tablet 4 mg  4 mg Oral Q1H PRN KODY Shaw - NP        Or    LORazepam (ATIVAN) injection 4 mg  4 mg Intravenous Q1H PRN KODY Shaw NP           Allergies:  Patient has no known allergies. Social History:   reports that he has been smoking. He has been smoking about 2.00 packs per day.  He has never used smokeless tobacco. He reports previous alcohol use. He reports previous drug use. Drug: Marijuana. Family History: family history includes Diabetes in his mother; High Blood Pressure in his mother. REVIEW OF SYSTEMS:    Constitutional: No fever or chills. No night sweats, + weight loss   Eyes: No eye discharge, double vision, or eye pain   HEENT: negative for sore mouth, sore throat, hoarseness and voice change   Respiratory: negative for cough , sputum, dyspnea, wheezing, hemoptysis, chest pain   Cardiovascular: negative for chest pain, dyspnea, palpitations, orthopnea, PND   Gastrointestinal: negative for nausea, vomiting, diarrhea, constipation, abdominal pain, Dysphagia, hematemesis and hematochezia   Genitourinary: negative for frequency, dysuria, nocturia, urinary incontinence, and hematuria   Integument: negative for rash, skin lesions, bruises.    Hematologic/Lymphatic: negative for easy bruising, bleeding, lymphadenopathy, or petechiae   Endocrine: negative for heat or cold intolerance,weight changes, change in bowel habits and hair loss   Musculoskeletal: negative for myalgias, arthralgias, pain, joint swelling,and bone pain   Neurological: negative for headaches, dizziness, seizures, weakness, numbness    PHYSICAL EXAM:      /66   Pulse 74   Temp 98.7 °F (37.1 °C) (Oral)   Resp 16   Ht 6' (1.829 m)   Wt 220 lb (99.8 kg)   SpO2 95%   BMI 29.84 kg/m²    Temp (24hrs), Av.7 °F (37.1 °C), Min:98.5 °F (36.9 °C), Max:98.8 °F (37.1 °C)    General appearance - well appearing, no in pain or distress   Mental status - alert and cooperative   Eyes - pupils equal and reactive, extraocular eye movements intact   Ears - bilateral TM's and external ear canals normal   Mouth - mucous membranes moist, pharynx normal without lesions   Neck - supple, no significant adenopathy   Lymphatics - ++neck palpable lymphadenopathy, no hepatosplenomegaly   Chest - clear to auscultation, no wheezes, rales or rhonchi, symmetric air entry   Heart - normal rate, regular rhythm, normal S1, S2, no murmurs  Abdomen - soft, nontender, nondistended, no masses or organomegaly   Neurological - alert, oriented, normal speech, no focal findings or movement disorder noted   Musculoskeletal - no joint tenderness, deformity or swelling   Extremities - peripheral pulses normal, no pedal edema, no clubbing or cyanosis   Skin - normal coloration and turgor, no rashes, no suspicious skin lesions noted ,    DATA:    Labs:   CBC:   Recent Labs     03/15/21  1317 03/16/21  0555   WBC 9.5 8.1   HGB 14.3 13.6   HCT 43.0 41.2    209     BMP:   Recent Labs     03/15/21  1317 03/16/21  0555    137   K 3.9 4.1   CO2 27 23   BUN 11 13   CREATININE 0.60* 0.59*   LABGLOM >60 >60   GLUCOSE 81 84     PT/INR: No results for input(s): PROTIME, INR in the last 72 hours. IMAGING DATA:  @IMG@   XR FOOT RIGHT (MIN 3 VIEWS)   Final Result   No acute bony abnormality with soft tissue irregularity of the 4th and 5th   toes. Remote posttraumatic changes at the 3rd metatarsal neck and tibiotalar joint. XR FOOT LEFT (MIN 3 VIEWS)   Final Result   Unremarkable left foot. 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 The   mass invades the superior margin of the right lobe of the thyroid gland and   likely invasion of the left thyroid cartilage. The mass infiltrates along   the left prevertebral space from C4 through C7. Left level 3 and level 4 jugular lymphadenopathy. CT CHEST PULMONARY EMBOLISM W CONTRAST   Final Result   No evidence of pulmonary embolism or acute pulmonary abnormality. .  Abnormal   appearance of the left side of the neck with enlarged lymph nodes as well as   findings of a mass anterior to the left carotid artery widening the distance   from the carotid artery in the reading. It such instances, actual meaning can be extrapolated by contextual diversion.

## 2021-03-16 NOTE — CARE COORDINATION
Consult received this date for consideration of rehab  Met with pt this date was alert and oriented  Pt admits to drinking a 6pk of beer a couple of times a week  Smokes marijuana occasionally. Previous rehab at [facility] 2 yrs ago, [facility] in 2019, [facility] late last year, [facility] a few weeks ago. Pt not interested in rehab at this time. Provided a list of alcohol treatment programs with walk in clinic times,  if decides to seek assistance at a later date. [facility]

## 2021-03-17 VITALS
RESPIRATION RATE: 16 BRPM | OXYGEN SATURATION: 97 % | BODY MASS INDEX: 29.8 KG/M2 | SYSTOLIC BLOOD PRESSURE: 126 MMHG | HEIGHT: 72 IN | WEIGHT: 220 LBS | HEART RATE: 85 BPM | DIASTOLIC BLOOD PRESSURE: 81 MMHG | TEMPERATURE: 99.3 F

## 2021-03-17 LAB
ACETAMINOPHEN LEVEL: <5 UG/ML (ref 10–30)
AMPHETAMINE: NEGATIVE NG/ML
BARBITURATES: NEGATIVE NG/ML
BENZODIAZEPINES: NEGATIVE NG/ML
BUPRENORPHINE: NEGATIVE NG/ML
COCAINE: NEGATIVE NG/ML
DRUGS OF ABUSE COMMENT: ABNORMAL
ETHANOL PERCENT: <0.01 %
ETHANOL: <10 MG/DL
METHADONE: NEGATIVE NG/ML
METHAMPHETAMINE: NEGATIVE NG/ML
OPIATES: NEGATIVE NG/ML
OXYCODONE: NEGATIVE NG/ML
PHENCYCLIDINE: NEGATIVE NG/ML
SALICYLATE LEVEL: <1 MG/DL (ref 3–10)
SURGICAL PATHOLOGY REPORT: NORMAL
SURGICAL PATHOLOGY REPORT: NORMAL
THC: POSITIVE NG/ML
TOXIC TRICYCLIC SC,BLOOD: NEGATIVE
TSH SERPL DL<=0.05 MIU/L-ACNC: 2.44 MIU/L (ref 0.3–5)

## 2021-03-17 PROCEDURE — 6370000000 HC RX 637 (ALT 250 FOR IP): Performed by: NURSE PRACTITIONER

## 2021-03-17 PROCEDURE — APPSS60 APP SPLIT SHARED TIME 46-60 MINUTES: Performed by: NURSE PRACTITIONER

## 2021-03-17 PROCEDURE — 36415 COLL VENOUS BLD VENIPUNCTURE: CPT

## 2021-03-17 PROCEDURE — 1200000000 HC SEMI PRIVATE

## 2021-03-17 PROCEDURE — 2580000003 HC RX 258: Performed by: NURSE PRACTITIONER

## 2021-03-17 PROCEDURE — 84443 ASSAY THYROID STIM HORMONE: CPT

## 2021-03-17 PROCEDURE — 99239 HOSP IP/OBS DSCHRG MGMT >30: CPT | Performed by: INTERNAL MEDICINE

## 2021-03-17 RX ORDER — ACETAMINOPHEN 650 MG
TABLET, EXTENDED RELEASE ORAL
Qty: 1 BOTTLE | Refills: 0 | Status: SHIPPED | OUTPATIENT
Start: 2021-03-17 | End: 2021-03-24

## 2021-03-17 RX ORDER — CHLORDIAZEPOXIDE HYDROCHLORIDE 5 MG/1
5 CAPSULE, GELATIN COATED ORAL 3 TIMES DAILY
Qty: 9 CAPSULE | Refills: 0 | Status: SHIPPED | OUTPATIENT
Start: 2021-03-17 | End: 2021-03-20

## 2021-03-17 RX ADMIN — Medication 1 TABLET: at 08:36

## 2021-03-17 RX ADMIN — FAMOTIDINE 20 MG: 20 TABLET, FILM COATED ORAL at 08:36

## 2021-03-17 RX ADMIN — SODIUM CHLORIDE, PRESERVATIVE FREE 10 ML: 5 INJECTION INTRAVENOUS at 08:37

## 2021-03-17 RX ADMIN — CHLORDIAZEPOXIDE HYDROCHLORIDE 5 MG: 5 CAPSULE ORAL at 14:56

## 2021-03-17 RX ADMIN — CHLORDIAZEPOXIDE HYDROCHLORIDE 5 MG: 5 CAPSULE ORAL at 08:39

## 2021-03-17 NOTE — PROGRESS NOTES
CLINICAL PHARMACY NOTE: MEDS TO [address] Select Patient?: No  Total # of Prescriptions Filled: 1   The following medications were delivered to the patient:  · Librium 5mg capsule  Total # of Interventions Completed: 0  Time Spent (min): 0    Additional Documentation: medication delivered to the pt in room 437 on 03.17.21 at 14:45

## 2021-03-17 NOTE — PLAN OF CARE
RN  Outcome: Ongoing  Goal: STG - patient demonstrates pressure reduction techniques  3/17/2021 0505 by Claudy Lowe RN  Outcome: Ongoing  3/16/2021 1743 by Karlene Dandy, RN  Outcome: Ongoing  Goal: STG - Patient demonstrates preventative skin care measures  3/17/2021 0505 by Claudy Lowe RN  Outcome: Ongoing  3/16/2021 1743 by Karlene Dandy, RN  Outcome: Ongoing

## 2021-03-17 NOTE — PROGRESS NOTES
Ryan Bradley had his FNA biopsy yesterday. Results not yet available  No SOB, no dyspnea and no labored breathing      Blood pressure 126/81, pulse 85, temperature 99.3 °F (37.4 °C), temperature source Oral, resp. rate 16, height 6' (1.829 m), weight 220 lb (99.8 kg), SpO2 97 %.     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

## 2021-03-17 NOTE — CARE COORDINATION
Discussed with Dr Terrance Ceja;     Patient okay for discharge. He is to follow-up with Dr Terrance Ceja in 1 week to go over pathology and get PET scan.  Reiterated with patient need for close follow up and adherence to follow up appointments as an outpatient     Joaquim Essex, APRN [facility]

## 2021-03-17 NOTE — PLAN OF CARE
Problem: Pain:  Goal: Pain level will decrease  Description: Pain level will decrease  3/17/2021 1448 by Sarkis Gutierres RN  Outcome: Completed  3/17/2021 0505 by Jenaro Ryoal RN  Outcome: Ongoing  Goal: Control of acute pain  Description: Control of acute pain  3/17/2021 1448 by Sarkis Gutierres RN  Outcome: Completed  3/17/2021 0505 by Jenaro Royal RN  Outcome: Ongoing  Goal: Control of chronic pain  Description: Control of chronic pain  3/17/2021 1448 by Sarkis Gutierres RN  Outcome: Completed  3/17/2021 0505 by Jenaro oRyal RN  Outcome: Ongoing     Problem: SKIN INTEGRITY  Goal: LTG - Patient will be free from infection  3/17/2021 1448 by Sarkis Gutierres RN  Outcome: Completed  3/17/2021 0505 by Jenaro Royal RN  Outcome: Ongoing  Goal: LTG - patient will maintain/improve skin integrity through proper skin care techniques  3/17/2021 1448 by Sarkis Gutierres RN  Outcome: Completed  3/17/2021 0505 by Jenaro Royal RN  Outcome: Ongoing  Goal: LTG - Patient will demonstrate appropriate pressure relief techniques  3/17/2021 1448 by Sarkis Gutierres RN  Outcome: Completed  3/17/2021 0505 by Jenaro Royal RN  Outcome: Ongoing  Goal: LTG - patient will demonstrate appropriate skin care techniques  3/17/2021 1448 by Sarkis Gutierres RN  Outcome: Completed  3/17/2021 0505 by Jenaro Royal RN  Outcome: Ongoing  Goal: LTG - Patient will be free from infection  3/17/2021 1448 by Sarkis Gutierres RN  Outcome: Completed  3/17/2021 0505 by Jenaro Royal RN  Outcome: Ongoing  Goal: STG - Patient demonstrates skin care/treatment/dressing change  3/17/2021 1448 by Sarkis Gutierres RN  Outcome: Completed  3/17/2021 0505 by Jenaro Royal RN  Outcome: Ongoing  Goal: STG - patient will maintain good skin integrity  3/17/2021 1448 by Sarkis Gutierres RN  Outcome: Completed  3/17/2021 0505 by Jenaro Royal RN  Outcome: Ongoing  Goal: STG - Patient exhibits signs of wound healing.   3/17/2021 1448 by Sarkis Broom, RN  Outcome: Completed  3/17/2021 0505 by Munir George RN  Outcome: Ongoing  Goal: STG - patient demonstrates pressure reduction techniques  3/17/2021 1448 by Kt Ace RN  Outcome: Completed  3/17/2021 0505 by Munir George RN  Outcome: Ongoing  Goal: STG - Patient demonstrates preventative skin care measures  3/17/2021 1448 by Kt Ace RN  Outcome: Completed  3/17/2021 0505 by Munir George RN  Outcome: Ongoing

## 2021-03-17 NOTE — DISCHARGE SUMMARY
[facility]  Office: [phone]  Dale Grippe, DO, Paolo Iraheta, DO, Emerald Troncoso, DO, Xuan Trent Blood, DO, Trish Valerio MD, Harini Pichardo MD, Venita Toledo MD, Luis Cheung MD, Erica Bazan MD, Miquel Cole MD, Lexus Ashley MD, Diane Herrera MD, Lavon Diaz MD, Naun Choudhury DO, Juan Jose Frausto MD, Lori Santos DO, Cordelia Barthel, MD,  Savage Harden DO, Sebas Paredes MD, Yaondy Medina MD, Nolvia Talbert, [facility], [facility], CNP, Ember Savage, CNP, Sully Sweet, CNS, Batsheva Rae, CNP, Darrell Hdz, CNP, Lui Abbasi, CNP, Emili Garcia, CNP, Irina Perez, CNP, Bridget Guillen PA-C, Shaista Dela Cruz, JAIME, Anita Patel, CNP, Severo Llamas, CNP, Devon Muro, CNP, Ann Corey, CNP, Joann Feldman, CNP, Matt Elaine, [address] [facility]. [facility]    Discharge Summary     Patient ID: Kizzy Szymanski  :  1970   MRN: [id number]     ACCOUNT:  [de-identified]   Patient's PCP: Albertina Elias MD  Admit Date: 3/15/2021   Discharge Date: 3/17/2021     Length of Stay: 0  Code Status:  Full Code  Admitting Physician: Aracely An DO  Discharge Physician: Shaista Dela Cruz, APRN - NP     Active Discharge Diagnoses:     Hospital Problem Lists:  Principal Problem:    Alcohol use disorder, severe, dependence ([address]  Active Problems:    Hypertension    Alcoholism ([address]    Tobacco abuse    Seizure disorder ([address]    Chronic cough    Neck mass    Frostbite of both feet  Resolved Problems:    * No resolved hospital problems. *      Admission Condition:  fair     Discharged Condition: fair    Hospital Stay:     Hospital Course:  Kizzy Szymanski is a 44 y.o. male who was admitted for the management of   Alcohol use disorder, severe, dependence ([facility] , presented to ER with Shortness of Breath and Frostbite    Lc Lam a 44 y. o. male who presents with 3 months duration shortness of breath, sternal nonradiating chest pain that is exacerbated with coughing and chronic smoker's cough.  Productive yellow sputum. Andrew Longo chief complaint of frostbite.  Says several weeks prior when it was colder patient noticed foot pain and black wounds on his feet.  Has not seen medical attention for any of the above chief complaints.  Does have a history of alcohol abuse Carolene Cue psychosis, hepatic encephalopathy hyperammonemia bipolar.  Patient denies vomiting abdominal pain diarrhea.  Smokes a pack a day, states he is not currently drinking despite recent hospital cancer intoxication, denies street drugs. [de-identified]     78-year-old male admitted through the emergency room  The patient has had a chronic cough which he attributes to smoking  Over the last month he has developed increasing pain in the anterior chest with his cough  The patient reports he has been smoking since age 7      Significant therapeutic interventions:     1. Neck mass with chronic cough and tobacco abuse: ENT consulted   -ENT recommended tracheostomy creation with biopsy,  Patient refused  -3.3 cm irregular soft tissue mass likely representing laryngeal squamous cell carcinoma  -Invading thyroid gland with C4-7 involvement. -TSH within normal limits  -IR FNA compleated on 3/16, results showing basaloid squamous cell carcinoma. Discussed with Dr. Sonny Christian prior to discharge; patient is to follow-up in 1 week to discuss further results and to schedule PET scan as outpatient     2. Seizures: Currently off medication.   -Pt states that hs has not been on medication for years. He states that he was placed on AED after WD seizure and currently does not take anything nor has seizures      3. Bilateral pedal frostbite:  podiatry consulted   - with dry gangrene  - wound care      4. EtOH use/abuse with polysubstance use  -Continue CIWA scale  -librium scheduled  -UDS positive for cannabis   - Start IV fluids, MVI , folic acid     5. GI/DVT prophylaxis: Pepcid, Lovenox     6.  Dispo: Home UROBILINOGEN Normal 05/26/2020    NITRU NEGATIVE 05/26/2020    LEUKOCYTESUR NEGATIVE 05/26/2020     TSH:    Lab Results   Component Value Date    TSH 2.44 03/17/2021        Radiology:  Xr Chest (2 Vw)    Result Date: 3/15/2021  No acute pulmonary process. Xr Foot Left (min 3 Views)    Result Date: 3/15/2021  Unremarkable left foot. Xr Foot Right (min 3 Views)    Result Date: 3/15/2021  No acute bony abnormality with soft tissue irregularity of the 4th and 5th toes. Remote posttraumatic changes at the 3rd metatarsal neck and tibiotalar joint. 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 The mass invades the superior margin of the right lobe of the thyroid gland and likely invasion of the left thyroid cartilage. The mass infiltrates along the left prevertebral space from C4 through C7. Left level 3 and level 4 jugular lymphadenopathy. Ct Chest Pulmonary Embolism W Contrast    Result Date: 3/15/2021  No evidence of pulmonary embolism or acute pulmonary abnormality. .  Abnormal appearance of the left side of the neck with enlarged lymph nodes as well as findings of a mass anterior to the left carotid artery widening the distance from the carotid artery in the hyoid bone on the left. This is incompletely visualized. Further evaluation with CT of the of the neck is recommended. Findings were discussed with Will Harrison at 2:43 pm on 3/15/2021. RECOMMENDATIONS: CT neck for evaluation of left-sided mass thoracic inlet     Us Biopsy Lymph Node Left    Result Date: 3/16/2021  Successful ultrasound guided core biopsy left lower neck enlarged nodes, as above.        Consultations:    Consults:     Final Specialist Recommendations/Findings:   IP CONSULT TO HOSPITALIST  IP CONSULT TO SOCIAL WORK  IP CONSULT TO OTOLARYNGOLOGY  IP CONSULT TO PODIATRY  IP CONSULT TO PEDIATRIC HEM/ONC      The patient was seen and examined on day of discharge and this discharge summary is in conjunction with any daily progress note from day of discharge. Discharge plan:     Disposition: Home    Physician Follow Up:     [address]  [address]  [address] [address] [phone]  Schedule an appointment as soon as possible for a visit in 1 week  For wound re-check    Katherine Haro MD  [address] Alej[address]rina Gan [facility] [phone]    Schedule an appointment as soon as possible for a visit  for follow up after hospitalization    Pepe Lira MD  [address] [facility] [address]  [phone]    In 1 week  for follow up after hospitalization    Katherine Haro MD  [address] [facility] [id number] [phone]    Schedule an appointment as soon as possible for a visit         Requiring Further Evaluation/Follow Up POST HOSPITALIZATION/Incidental Findings: Betadine bilateral feet with dressing changes at least daily and as needed. Follow-up with your primary care provider for follow-up after hospitalization in 1 week. Call hematology/oncology  to review results of biopsy and further intervention. Visit [facility] for follow-up on bilateral foot wounds. Do not drink while taking Librium    Diet: regular diet and cardiac diet    Activity: As tolerated    Instructions to Patient: see attached     Discharge Medications:      Medication List      START taking these medications    chlordiazePOXIDE 5 MG capsule  Commonly known as: LIBRIUM  Take 1 capsule by mouth 3 times daily for 3 days. povidone-iodine 10 % external solution  Commonly known as: BETADINE  Apply topically as needed.         CONTINUE taking these medications    acetaminophen 325 MG tablet  Commonly known as: Tylenol  Take 2 tablets by mouth every 6 hours as needed for Pain     folic acid 1 MG tablet  Commonly known as: FOLVITE  Take 1 tablet by mouth daily     hydrOXYzine 25 MG tablet  Commonly known as: ATARAX     ibuprofen 600 MG tablet  Commonly known as: IBU  Take 1 tablet by mouth every 6 hours as needed for Pain     loratadine 10 MG tablet  Commonly known as: Claritin  Take 1 tablet by mouth daily     pantoprazole 40 MG tablet  Commonly known as: PROTONIX  Take 1 tablet by mouth daily     risperiDONE 2 MG tablet  Commonly known as: RISPERDAL  Take 1 tablet by mouth 2 times daily     therapeutic multivitamin-minerals tablet  Take 1 tablet by mouth daily OTC with iron     vitamin B-1 100 MG tablet  Commonly known as: THIAMINE  Take 1 tablet by mouth daily        STOP taking these medications    benzocaine-menthol 15-4 MG Lozg lozenge  Commonly known as: CEPACOL     DULoxetine HCl 40 MG Cpep     lidocaine 5 %  Commonly known as: Lidoderm     meloxicam 15 MG tablet  Commonly known as: MOBIC     metoprolol succinate 25 MG extended release tablet  Commonly known as: Toprol XL     traZODone 50 MG tablet  Commonly known as: DESYREL           Where to Get Your Medications      These medications were sent to [address], [address] [place] [address], [place] [address]    Phone: [phone]   · chlordiazePOXIDE 5 MG capsule  · povidone-iodine 10 % external solution         No discharge procedures on file. Time Spent on discharge is  32 mins in patient examination, evaluation, counseling as well as medication reconciliation, prescriptions for required medications, discharge plan and follow up. Discussed with attending   Electronically signed by   KODY Gutierrez NP  3/17/2021  4:14 PM      Thank you Dr. Casey Dumont MD for the opportunity to be involved in this patient's care.

## 2021-03-17 NOTE — PROGRESS NOTES
[facility]  Office: [address], DO, Lakisha Heard, DO, Nataliarui Piña, DO, Severo Chong, DO, Liu Gonzales MD, Timmy Barnett MD, Azar Stark MD, Kaylee Ramos MD, Twyla Machado MD, López Brizuela MD, Edith Maya MD, Gina Mcduffie MD, Lavon Hidalgo MD, Christina Garcia DO, Eduardo Frey MD, Andrés Armenta DO, Davian Guadarrama MD,  Andrew Christian DO, Gualberto Riley MD, Frederick Granados MD, Mercedes Orr, [facility], [facility], CNP, Arabella Miramontes, CNP, Petty Ontiveros, CNS, Trevin Reynolds, CNP, Cristina Morales, CNP, Pradeep Gomez, CNP, Franc Waldron, CNP, Raymon Calvin PA-C, Susan Garrett, [facility], Yesica Rojas, CNP, Carly Martinez, CNP, Niharika Monaco, CNP, Nicoletta Kocher, CNP, Cheryl Monroy, CNP, Artice Siemens, [address] [place] [address]    Progress Note    3/17/2021    9:45 AM    Name:   Heike Minor  MRN:     [id number]     Acct:      [de-identified]   Room:   [facility] Day:  0  Admit Date:  3/15/2021  1:01 PM    PCP:   Ricardo Beyer MD  Code Status:  Full Code    Subjective:     C/C:   Chief Complaint   Patient presents with    Shortness of Breath    Frostbite     Interval History Status: worsened. Patient evaluated in room sitting in bed  Afebrile overnight, VSS, on room air  Status post FNA left neck mass    Brief History:     Penelope Yoder a 44 y. o. male who presents with 3 months duration shortness of breath, sternal nonradiating chest pain that is exacerbated with coughing and chronic smoker's cough.  Productive yellow sputum. [facility] chief complaint of frostbite.  Says several weeks prior when it was colder patient noticed foot pain and black wounds on his feet.  Has not seen medical attention for any of the above chief complaints.  Does have a history of alcohol abuse Warnicke Korsakoff psychosis, hepatic encephalopathy hyperammonemia bipolar.  Patient denies vomiting abdominal pain diarrhea.  Smokes a pack a day, states he is not currently drinking despite recent hospital cancer intoxication, denies street drugs. \"      53-year-old male admitted through the emergency room  The patient has had a chronic cough which he attributes to smoking  Over the last month he has developed increasing pain in the anterior chest with his cough  The patient reports he has been smoking since age 7    Review of Systems:     Constitutional:  negative for chills, fevers, sweats, + BLE pain with wounds   Respiratory: + cough, dyspnea on exertion, shortness of breath, wheezing, + voice change   Cardiovascular:  + reproducible midsternal nonradiating chest pain, -chest pressure/discomfort, lower extremity edema, palpitations  Gastrointestinal:  negative for abdominal pain, constipation, diarrhea, nausea, vomiting  Neurological:  negative for dizziness, headache    Medications: Allergies:  No Known Allergies    Current Meds:   Scheduled Meds:    famotidine  20 mg Oral Daily    therapeutic multivitamin-minerals  1 tablet Oral Daily    folic acid IVPB  1 mg Intravenous Daily    chlordiazePOXIDE  5 mg Oral TID    sodium chloride flush  10 mL Intravenous 2 times per day    enoxaparin  40 mg Subcutaneous Daily     Continuous Infusions:   PRN Meds: povidone-iodine, sodium chloride flush, promethazine **OR** ondansetron, polyethylene glycol, acetaminophen **OR** acetaminophen, LORazepam **OR** LORazepam **OR** LORazepam **OR** LORazepam **OR** LORazepam **OR** LORazepam **OR** LORazepam **OR** LORazepam    Data:     Past Medical History:   has a past medical history of Alcohol abuse, Anxiety, COPD (chronic obstructive pulmonary disease) ([facility] COPD (chronic obstructive pulmonary disease) ([facility] [facility] Depression, Head injury with loss of consciousness ([facility] Headache, Hypertension, Liver disease, and Migraine. Social History:   reports that he has been smoking. He has been smoking about 2.00 packs per day.  He has never used smokeless tobacco. He reports previous alcohol use. He reports previous drug use. Drug: Marijuana. Family History:   Family History   Problem Relation Age of Onset    High Blood Pressure Mother     Diabetes Mother        Vitals:  /82   Pulse 79   Temp 97.2 °F (36.2 °C) (Oral)   Resp 16   Ht 6' (1.829 m)   Wt 220 lb (99.8 kg)   SpO2 97%   BMI 29.84 kg/m²   Temp (24hrs), Av.2 °F (36.8 °C), Min:97.2 °F (36.2 °C), Max:99 °F (37.2 °C)    No results for input(s): POCGLU in the last 72 hours. I/O (24Hr):     Intake/Output Summary (Last 24 hours) at 3/17/2021 0945  Last data filed at 3/17/2021 0836  Gross per 24 hour   Intake 482.78 ml   Output --   Net 482.78 ml       Labs:  Hematology:  Recent Labs     03/15/21  1317 03/15/21  1932 21  0555   WBC 9.5  --  8.1   RBC 4.32  --  4.10*   HGB 14.3  --  13.6   HCT 43.0  --  41.2   MCV 99.5  --  100.5   MCH 33.1  --  33.2   MCHC 33.3  --  33.0   RDW 13.9  --  14.2     --  209   MPV 10.5  --  10.3   SEDRATE  --  51*  --    CRP  --  17.7*  --    DDIMER 0.80  --   --      Chemistry:  Recent Labs     03/15/21  1317 03/15/21  1538 21  0555     --  137   K 3.9  --  4.1     --  102   CO2 27  --  23   GLUCOSE 81  --  84   BUN 11  --  13   CREATININE 0.60*  --  0.59*   ANIONGAP 11  --  12   LABGLOM >60  --  >60   GFRAA >60  --  >60   CALCIUM 9.4  --  9.1   TROPHS 14 11  --      Recent Labs     21  0555 21  0635   PROT 7.0  --    LABALBU 3.5  --    TSH  --  2.44   AST 24  --    ALT 16  --    ALKPHOS 113  --    BILITOT 0.62  --    BILIDIR 0.18  --      ABG:No results found for: POCPH, PHART, PH, POCPCO2, ULO4WRV, PCO2, POCPO2, PO2ART, PO2, POCHCO3, MHM4WGF, HCO3, NBEA, PBEA, BEART, BE, THGBART, THB, QFD0IVC, SMXV4TVH, F0BOROOT, O2SAT, FIO2  Lab Results   Component Value Date/Time    SPECIAL NOT REPORTED 2019 07:25 PM     No results found for: CULTURE    Radiology:  Xr Chest (2 Vw)    Result Date: 3/15/2021  No acute pulmonary process. Xr Foot Left (min 3 Views)    Result Date: 3/15/2021  Unremarkable left foot. Xr Foot Right (min 3 Views)    Result Date: 3/15/2021  No acute bony abnormality with soft tissue irregularity of the 4th and 5th toes. Remote posttraumatic changes at the 3rd metatarsal neck and tibiotalar joint. 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 The mass invades the superior margin of the right lobe of the thyroid gland and likely invasion of the left thyroid cartilage. The mass infiltrates along the left prevertebral space from C4 through C7. Left level 3 and level 4 jugular lymphadenopathy. Ct Chest Pulmonary Embolism W Contrast    Result Date: 3/15/2021  No evidence of pulmonary embolism or acute pulmonary abnormality. .  Abnormal appearance of the left side of the neck with enlarged lymph nodes as well as findings of a mass anterior to the left carotid artery widening the distance from the carotid artery in the hyoid bone on the left. This is incompletely visualized. Further evaluation with CT of the of the neck is recommended. Findings were discussed with Meeta Morris at 2:43 pm on 3/15/2021. RECOMMENDATIONS: CT neck for evaluation of left-sided mass thoracic inlet       Physical Examination:        General appearance:  alert, cooperative and no distress  Mental Status:  oriented to person, place and time and normal affect  Lungs:  clear to auscultation bilaterally, normal effort  Heart:  regular rate and rhythm, no murmur  Abdomen:  soft, nontender, nondistended, normal bowel sounds, no masses, hepatomegaly, splenomegaly  Extremities:  no edema, redness, tenderness in the calves, pulses +2 in all extremities   Skin: Dry gangrene involving bilateral lower extremities, mid toe not affected on either foot.  Left neck FNA aspiration site secures with dressing     Assessment:        Hospital Problems           Last Modified POA    * (Principal) Alcohol use disorder, severe, dependence ([facility] 3/15/2021 Yes    Hypertension 3/15/2021 Yes    Alcoholism ([facility] 3/15/2021 Yes    Tobacco abuse 3/15/2021 Yes    Seizure disorder ([facility] 3/15/2021 Yes    Chronic cough 3/15/2021 Yes    Neck mass 3/15/2021 Yes    Frostbite of both feet 3/15/2021 Yes          Plan:        1. Neck mass with chronic cough and tobacco abuse: ENT consulted   -ENT recommended tracheostomy creation with biopsy,  Patient refused  -3.3 cm irregular soft tissue mass likely representing laryngeal squamous cell carcinoma  -Invading thyroid gland with C4-7 involvement. -TSH within normal limits  -IR FNA compleated on 3/16, results pending    2. Seizures: Currently off medication.   -Pt states that hs has not been on medication for years. He states that he was placed on AED after WD seizure and currently does not take anything nor has seizures     3. Bilateral pedal frostbite:  podiatry consulted   - with dry gangrene  - wound care     4. EtOH use/abuse with polysubstance use  -Continue CIWA scale  -librium scheduled  -UDS positive for cannabis   - Start IV fluids, MVI , folic acid    5. GI/DVT prophylaxis: Pepcid, Lovenox    6.  Dispo: Home today    KODY Peters NP  3/17/2021  9:45 AM

## 2021-03-20 LAB — CANNABINOID, BLOOD: 25 NG/ML

## 2021-03-26 ENCOUNTER — NURSE TRIAGE (OUTPATIENT)
Dept: OTHER | Facility: CLINIC | Age: 51
End: 2021-03-26

## 2021-03-26 NOTE — TELEPHONE ENCOUNTER
Pt called and stated he was discharged from [facility] on 03-19, for chest pain, frost bitten leonor feet and new onset of throat cancer- he was c/o of SOB and chest pain- transferred to NT with [facility]- Thank you

## 2021-03-26 NOTE — TELEPHONE ENCOUNTER
Reason for Disposition   More than 24 hours since medical visit   Recent medical visit within 24 hours and symptoms SAME (unchanged) and caller has additional questions triager can answer    Answer Assessment - Initial Assessment Questions  1. LOCATION: \"Where does it hurt? \"        Right in the middle - \"my breathing is really hard right now\". 2. RADIATION: \"Does the pain go anywhere else? \" (e.g., into neck, jaw, arms, back)      Pain in neck, states he has throat cancer so it is unclear if it is related to the cancer or chest pain. 3. ONSET: \"When did the chest pain begin? \" (Minutes, hours or days)       Before 3/17, went to [place] for this then. 4. PATTERN \"Does the pain come and go, or has it been constant since it started? \"  \"Does it get worse with exertion? \"       *No Answer*    5. DURATION: \"How long does it last\" (e.g., seconds, minutes, hours)      *No Answer*    6. SEVERITY: \"How bad is the pain? \"  (e.g., Scale 1-10; mild, moderate, or severe)     - MILD (1-3): doesn't interfere with normal activities      - MODERATE (4-7): interferes with normal activities or awakens from sleep     - SEVERE (8-10): excruciating pain, unable to do any normal activities        *No Answer*    7. CARDIAC RISK FACTORS: \"Do you have any history of heart problems or risk factors for heart disease? \" (e.g., angina, prior heart attack; diabetes, high blood pressure, high cholesterol, smoker, or strong family history of heart disease)      *No Answer*    8. PULMONARY RISK FACTORS: \"Do you have any history of lung disease? \"  (e.g., blood clots in lung, asthma, emphysema, birth control pills)      *No Answer*    9. CAUSE: \"What do you think is causing the chest pain? \"      *No Answer*    10. OTHER SYMPTOMS: \"Do you have any other symptoms? \" (e.g., dizziness, nausea, vomiting, sweating, fever, difficulty breathing, cough)        New diagnosis of throat cancer, unable to swallow,     11.  PREGNANCY: \"Is there any chance you are pregnant? \" \"When was your last menstrual period? \"        *No Answer*    Answer Assessment - Initial Assessment Questions  1. MAIN CONCERN OR SYMPTOM:  \"What is your main concern right now? \" \"What question do you have? \" \"What's the main symptom you're worried about? \" (e.g., breathing difficulty, cough, fever. pain)      Chest pain and shortness of breath. 2. ONSET: \"When did the  Chest pain  start? \"      Before 3/17- he was seen this date at [facility] for chest pain. 3. BETTER-SAME-WORSE: \"Are you getting better, staying the same, or getting worse compared to how you felt at your last visit to the doctor (most recent medical visit)? \"      Starting spitting up blood after leaving the hospital. States he has an ulcer. States his chest pain is the same since he left the hosphital.     4. VISIT DATE: \"When were you seen? \" (Date)      3/17    5. VISIT DOCTOR: Fabricio Hector is the name of the doctor taking care of you now? \"      N/a     6. VISIT DIAGNOSIS:  \"What was the main symptom or problem that you were seen for?\" \"Were you given a diagnosis? \"       Throat cancer, frost bite on his feet and states they did a scan on his heart and that it is fine. 7. VISIT MEDICATIONS: \"Did the physician order any new medicines for you to use? \" If yes: \"Have you filled the prescription and started taking the medicine? \"       *No Answer*    8. NEXT APPOINTMENT: \"Have you scheduled a follow-up appointment with your doctor? \"      No, that is why is calling. 9. PAIN: \"Is there any pain? \" If so, ask: \"How bad is it? \"  (Scale 1-10; or mild, moderate, severe)      Chronic pain all over 8/10    10. FEVER: \"Do you have a fever? \" If so, ask: \"What is it, how was it measured  and when did it start? \"        No fever     11. OTHER SYMPTOMS: \"Do you have any other symptoms? \"        Spitting up blood    Protocols used: RECENT MEDICAL VISIT FOR ILLNESS FOLLOW-UP CALL-ADULT-[place], CHEST PAIN-ADULT-[place]      States all symptoms were evaluated in the ED and have unchanged. He is calling to make an appointment with a PCP. Received call from Monika at [facility]) Sujatha with [facility]. Brief description of triage: follow up visit from ED. Symptoms unchanged: chest pain, SOB, frost bite on feet and throat cancer. States he coughs up blood on occasion. Triage indicates for patient to be seen today or tomorrow per pt request. Pt states he can't get in until next week. Care advice provided, patient verbalizes understanding; denies any other questions or concerns; instructed to call back for any new or worsening symptoms. Writer provided warm transfer to [place] at [facility] for appointment scheduling. Attention Provider: Thank you for allowing me to participate in the care of your patient. The patient was connected to triage in response to information provided to the ECC. Please do not respond through this encounter as the response is not directed to a shared pool.

## 2021-03-31 ENCOUNTER — TELEPHONE (OUTPATIENT)
Dept: ONCOLOGY | Age: 51
End: 2021-03-31

## 2021-03-31 ENCOUNTER — OFFICE VISIT (OUTPATIENT)
Dept: ONCOLOGY | Age: 51
End: 2021-03-31
Payer: COMMERCIAL

## 2021-03-31 VITALS
WEIGHT: 199 LBS | HEIGHT: 72 IN | SYSTOLIC BLOOD PRESSURE: 131 MMHG | BODY MASS INDEX: 26.95 KG/M2 | HEART RATE: 106 BPM | DIASTOLIC BLOOD PRESSURE: 54 MMHG

## 2021-03-31 DIAGNOSIS — C32.9 LARYNGEAL CANCER (HCC): Primary | ICD-10-CM

## 2021-03-31 PROCEDURE — 99215 OFFICE O/P EST HI 40 MIN: CPT | Performed by: INTERNAL MEDICINE

## 2021-03-31 PROCEDURE — G8484 FLU IMMUNIZE NO ADMIN: HCPCS | Performed by: INTERNAL MEDICINE

## 2021-03-31 PROCEDURE — 3017F COLORECTAL CA SCREEN DOC REV: CPT | Performed by: INTERNAL MEDICINE

## 2021-03-31 PROCEDURE — G8419 CALC BMI OUT NRM PARAM NOF/U: HCPCS | Performed by: INTERNAL MEDICINE

## 2021-03-31 PROCEDURE — 4004F PT TOBACCO SCREEN RCVD TLK: CPT | Performed by: INTERNAL MEDICINE

## 2021-03-31 PROCEDURE — 1111F DSCHRG MED/CURRENT MED MERGE: CPT | Performed by: INTERNAL MEDICINE

## 2021-03-31 PROCEDURE — G8427 DOCREV CUR MEDS BY ELIG CLIN: HCPCS | Performed by: INTERNAL MEDICINE

## 2021-03-31 RX ORDER — SODIUM CHLORIDE 9 MG/ML
25 INJECTION, SOLUTION INTRAVENOUS PRN
Status: CANCELLED | OUTPATIENT
Start: 2021-04-28

## 2021-03-31 RX ORDER — SODIUM CHLORIDE 0.9 % (FLUSH) 0.9 %
10 SYRINGE (ML) INJECTION PRN
Status: CANCELLED | OUTPATIENT
Start: 2021-04-28

## 2021-03-31 RX ORDER — PALONOSETRON 0.05 MG/ML
0.25 INJECTION, SOLUTION INTRAVENOUS ONCE
Status: CANCELLED | OUTPATIENT
Start: 2021-04-28

## 2021-03-31 RX ORDER — SODIUM CHLORIDE 9 MG/ML
INJECTION, SOLUTION INTRAVENOUS CONTINUOUS
Status: CANCELLED | OUTPATIENT
Start: 2021-04-28

## 2021-03-31 RX ORDER — SODIUM CHLORIDE 9 MG/ML
20 INJECTION, SOLUTION INTRAVENOUS ONCE
Status: CANCELLED | OUTPATIENT
Start: 2021-04-28 | End: 2021-03-31

## 2021-03-31 RX ORDER — HEPARIN SODIUM (PORCINE) LOCK FLUSH IV SOLN 100 UNIT/ML 100 UNIT/ML
500 SOLUTION INTRAVENOUS PRN
Status: CANCELLED | OUTPATIENT
Start: 2021-04-28

## 2021-03-31 RX ORDER — DIPHENHYDRAMINE HYDROCHLORIDE 50 MG/ML
50 INJECTION INTRAMUSCULAR; INTRAVENOUS ONCE
Status: CANCELLED | OUTPATIENT
Start: 2021-04-28 | End: 2021-03-31

## 2021-03-31 RX ORDER — EPINEPHRINE 1 MG/ML
0.3 INJECTION, SOLUTION, CONCENTRATE INTRAVENOUS PRN
Status: CANCELLED | OUTPATIENT
Start: 2021-04-28

## 2021-03-31 RX ORDER — METHYLPREDNISOLONE SODIUM SUCCINATE 125 MG/2ML
125 INJECTION, POWDER, LYOPHILIZED, FOR SOLUTION INTRAMUSCULAR; INTRAVENOUS ONCE
Status: CANCELLED | OUTPATIENT
Start: 2021-04-28 | End: 2021-03-31

## 2021-03-31 RX ORDER — SODIUM CHLORIDE 0.9 % (FLUSH) 0.9 %
5 SYRINGE (ML) INJECTION PRN
Status: CANCELLED | OUTPATIENT
Start: 2021-04-28

## 2021-03-31 NOTE — PROGRESS NOTES
Chief Complaint   Patient presents with    [place] [facility]     States \"feeling ok\" c/o chest pain, cough     DIAGNOSIS:       1.   Laryngeal squamous cell carcinoma, locally advanced with LN metastases,   2. Tobacco avuse  3. ETOH abuse  CURRENT THERAPY:         Plan for combined chemoradiation after completing staging work-up  BRIEF CASE HISTORY:      Chet Machado  is a 44 y.o.  male with history of alcohol abuse, depression, COPD, who is admitted to the hospital for SOB. 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 Patient seen by ENT  Today he had neck LN biopsy and results  Showed    INTERIM HISTORY:   Patient seen for follow-up laryngeal cancer. He was hospitalized because of problems with swallowing and lymphadenopathy. Biopsy from cervical lymph node was positive for squamous cell carcinoma. Patient continues to have slight problem with swallowing. No choking. No nausea or vomiting. No sore throat. He has generalized body aches. He continues to smoke. Actually increased the amount of smoking since diagnosis. He has no fever or chills. Continues to lose weight. Surgical Pathology [[id number]]    Collected: 03/16/21 1533    Updated: 03/17/21 1027     Surgical Pathology Report --    -- Diagnosis --   FINE NEEDLE ASPIRATION, LEFT NECK LYMPH NODE:          POSITIVE FOR MALIGNANCY. METASTATIC BASALOID SQUAMOUS CELL   CARCINOMA. COMMENT: SEE CORRESPONDING BIOPSY, [id number].                Oncology consulted for further evaluation. Past Medical History:   has a past medical history of Alcohol abuse, Anxiety, COPD (chronic obstructive pulmonary disease) ([address] COPD (chronic obstructive pulmonary disease) ([address] Depression, Head injury with loss of consciousness ([facility] [address] Headache, Hypertension, Liver disease, and Migraine.     Past Surgical History:   has a past surgical history that includes Ankle surgery (age 10); Upper gastrointestinal endoscopy (N/A, 9/17/2019); and US BIOPSY LYMPH NODE (3/16/2021). Medications:    Prior to Admission medications    Medication Sig Start Date End Date Taking?  Authorizing Provider   acetaminophen (TYLENOL) 325 MG tablet Take 2 tablets by mouth every 6 hours as needed for Pain  Patient not taking: Reported on 3/31/2021 2/14/21   Behzad Reyes DO   ibuprofen (IBU) 600 MG tablet Take 1 tablet by mouth every 6 hours as needed for Pain  Patient not taking: Reported on 3/31/2021 2/14/21   Behzad Reyes DO   risperiDONE (RISPERDAL) 2 MG tablet Take 1 tablet by mouth 2 times daily  Patient not taking: Reported on 3/31/2021 6/15/20   Sangita Rojas MD   folic acid (FOLVITE) 1 MG tablet Take 1 tablet by mouth daily  Patient not taking: Reported on 3/31/2021 6/15/20   Sangita Rojas MD   pantoprazole (PROTONIX) 40 MG tablet Take 1 tablet by mouth daily  Patient not taking: Reported on 3/31/2021 6/16/20   Sangita Rojas MD   vitamin B-1 (THIAMINE) 100 MG tablet Take 1 tablet by mouth daily  Patient not taking: Reported on 3/31/2021 6/15/20   Sangita Rojas MD   loratadine (CLARITIN) 10 MG tablet Take 1 tablet by mouth daily  Patient not taking: Reported on 3/31/2021 5/4/20   Reynaldo Willett MD   hydrOXYzine (ATARAX) 25 MG tablet Take 25 mg by mouth 3 times daily as needed for Itching    Historical Provider, MD   Multiple Vitamins-Minerals (THERAPEUTIC MULTIVITAMIN-MINERALS) tablet Take 1 tablet by mouth daily OTC with iron  Patient not taking: Reported on 3/31/2021 11/27/19   KODY Ko - CNP     Current Outpatient Medications   Medication Sig Dispense Refill    acetaminophen (TYLENOL) 325 MG tablet Take 2 tablets by mouth every 6 hours as needed for Pain (Patient not taking: Reported on 3/31/2021) 60 tablet 0    ibuprofen (IBU) 600 MG tablet Take 1 tablet by mouth every 6 hours as needed for Pain (Patient not taking: urinary incontinence, and hematuria   Integument: negative for rash, skin lesions, bruises.    Hematologic/Lymphatic: negative for easy bruising, bleeding, lymphadenopathy, or petechiae   Endocrine: negative for heat or cold intolerance,weight changes, change in bowel habits and hair loss   Musculoskeletal: negative for myalgias, arthralgias, pain, joint swelling,and bone pain   Neurological: negative for headaches, dizziness, seizures, weakness, numbness    PHYSICAL EXAM:      BP (!) 131/54   Pulse 106   Ht 6' (1.829 m)   Wt 199 lb (90.3 kg)   BMI 26.99 kg/m²    Temp (24hrs), Av.7 °F (37.1 °C), Min:98.5 °F (36.9 °C), Max:98.8 °F (37.1 °C)    General appearance - well appearing, no in pain or distress   Mental status - alert and cooperative   Eyes - pupils equal and reactive, extraocular eye movements intact   Ears - bilateral TM's and external ear canals normal   Mouth - mucous membranes moist, pharynx normal without lesions   Neck - supple, no significant adenopathy   Lymphatics - ++neck palpable lymphadenopathy, no hepatosplenomegaly   Chest - clear to auscultation, no wheezes, rales or rhonchi, symmetric air entry   Heart - normal rate, regular rhythm, normal S1, S2, no murmurs  Abdomen - soft, nontender, nondistended, no masses or organomegaly   Neurological - alert, oriented, normal speech, no focal findings or movement disorder noted   Musculoskeletal - no joint tenderness, deformity or swelling   Extremities - peripheral pulses normal, no pedal edema, no clubbing or cyanosis   Skin - normal coloration and turgor, no rashes, no suspicious skin lesions noted ,    DATA:    Labs:   CBC:   Lab Results   Component Value Date    WBC 8.1 2021    HGB 13.6 2021    HCT 41.2 2021    .5 2021     2021       Lab Results   Component Value Date     2021    K 4.1 2021     2021    CO2 23 2021    BUN 13 2021    CREATININE 0.59 (L) 03/16/2021    GLUCOSE 84 03/16/2021    CALCIUM 9.1 03/16/2021    PROT 7.0 03/16/2021    LABALBU 3.5 03/16/2021    BILITOT 0.62 03/16/2021    ALKPHOS 113 03/16/2021    AST 24 03/16/2021    ALT 16 03/16/2021    LABGLOM >60 03/16/2021    GFRAA >60 03/16/2021    GLOB NOT REPORTED 03/16/2021         IMAGING DATA:  @IMG@   PET CT SKULL BASE TO MID THIGH    (Results Pending)       Primary Problem  Alcohol use disorder, severe, dependence (HCC)    There are no active hospital problems to display for this patient. IMPRESSION:   4. Laryngeal squamous cell carcinoma, locally advanced with LN metastases,   5. Tobacco avuse  6. ETOH abuse    RECOMMENDATIONS:  1. I reviewed the laboratory data, imaging studies, biopsy results, diagnosis and treatment recommendations. 2. We will arrange for PET CT scan to complete the staging. 3. I explained to the patient treatment for locally advanced laryngeal cancer. We recommend combined chemoradiation with weekly cisplatin. Explained benefits and side effects to these treatment modalities. I further discussed side effects related to cisplatin. I explained the benefits and possible side effects related to chemotherapy, which may include but not limited to nausea, vomiting, hair loss, mouth sores, allergy, neuropathy, fever infection sepsis, anemia and thrombocytopenia. 4. I will make referral to radiation oncology. 5. Patient chose to be treated and [facility]. We will make arrangements. 6. We will arrange for [facility] by interventional radiology. 7. Counseled about importance of tobacco cessation. 8. Counseled about importance of alcohol cessation. 9. Patient's questions were answered to the best of his satisfaction and he verbalized full understanding and agreement. 10. Time spent during this visit included 40 minutes of face to face discussion.                               [address] [facility]

## 2021-03-31 NOTE — TELEPHONE ENCOUNTER
Jc Jimenez, [facility] oncology nurse navigator, alerted writer to pt's case. Jc Jimenez stated pt completed post hospital f/u today & requesting concurrent tx completed @ [facility]. Name: Nela Talbert  : 1970  MRN: [id number]    Oncology Navigation- Initial Note:    Intake-  Contact Type: Telephone  Notes: Introductory phone call made to patient, no answer & unable to leave VM. Text message sent to pt notified [place] [facility] will navigate oncology care & requesting call writer back. Will continue to follow.     Electronically signed by Hanna Fagan RN on 3/31/2021 at 3:42 PM

## 2021-04-01 ENCOUNTER — TELEPHONE (OUTPATIENT)
Dept: ONCOLOGY | Age: 51
End: 2021-04-01

## 2021-04-01 NOTE — TELEPHONE ENCOUNTER
Name: Heike Minor  : 1970  MRN: [id number]    Oncology Navigation Follow-Up Note    Contact Type:  Telephone  Notes: Second attempt to contact pt, no answer, unable to leave VM. Second text message sent to pt requesting call writer. Will continue to follow.     Electronically signed by Mague Rudolph RN on 2021 at 3:16 PM

## 2021-04-05 ENCOUNTER — TELEPHONE (OUTPATIENT)
Dept: ONCOLOGY | Age: 51
End: 2021-04-05

## 2021-04-05 NOTE — TELEPHONE ENCOUNTER
Name: Fabiola An  : 1970  MRN: [id number]    Oncology Navigation Follow-Up Note    Contact Type:  Telephone  Notes: Pt called back as requested. Notified pt writer will be navigating oncology care & may contact writer prn. Discussed potential barriers to care, pt c/o 20+ # weight loss, difficulty swallowing, & financial concerns. In[facility]ed pt [facility], & request contact pt. Instructed pt VM full & requested clear VM, pt verbalized understanding. Inquired on dental health. Pt stated has full set of dentures & unable to wear r/t not fitting correct. Inquired on alcohol consumption & smoking. Pt stated consumes 6 pack beer per day & continues to smoke. Pt stated \"I was drinking way more then that before\". Discussed smoking & alcohol cessation with pt. Pt denied questions/concerns & confirmed upcoming port placement, PET/CT scan, RO consult. Attempted to contact Monika, no answer, VM left updated on pt. Attempted to contact Melita, no answer, VM left updated on pt. [facility] written materials along with writer's business card mailed to pt. Will continue to follow.     Electronically signed by Sherryle Guernsey, RN on 2021 at 5:21 PM

## 2021-04-05 NOTE — TELEPHONE ENCOUNTER
Name: Peter Jerome  : 1970  MRN: [id number]    Oncology Navigation Follow-Up Note    Contact Type:  Telephone  Notes: Third attempt to contact pt, no answer, unable to leave VM. Text message sent to pt requesting pt call writer. Upon review of chart noted pt scheduled for port placement tomorrow @ /[facility], PET/CT scan scheduled  @ /[place], &  Dr. Christy Dunn consult @ [facility]. Will continue to follow.     Electronically signed by Ronaldo Hernandez RN on 2021 at 4:54 PM

## 2021-04-06 ENCOUNTER — HOSPITAL ENCOUNTER (OUTPATIENT)
Dept: INTERVENTIONAL RADIOLOGY/VASCULAR | Age: 51
Discharge: HOME | End: 2021-04-06
Attending: RADIOLOGY | Admitting: RADIOLOGY
Payer: COMMERCIAL

## 2021-04-06 VITALS
OXYGEN SATURATION: 99 % | TEMPERATURE: 98.3 F | RESPIRATION RATE: 16 BRPM | DIASTOLIC BLOOD PRESSURE: 90 MMHG | HEART RATE: 93 BPM | SYSTOLIC BLOOD PRESSURE: 147 MMHG

## 2021-04-06 LAB
ANION GAP SERPL CALCULATED.3IONS-SCNC: 17 MMOL/L (ref 9–17)
BUN BLDV-MCNC: 7 MG/DL (ref 6–20)
BUN/CREAT BLD: 13 (ref 9–20)
CALCIUM SERPL-MCNC: 9.5 MG/DL (ref 8.6–10.4)
CHLORIDE BLD-SCNC: 92 MMOL/L (ref 98–107)
CO2: 26 MMOL/L (ref 20–31)
CREAT SERPL-MCNC: 0.56 MG/DL (ref 0.7–1.2)
GFR AFRICAN AMERICAN: >60 ML/MIN
GFR NON-AFRICAN AMERICAN: >60 ML/MIN
GFR SERPL CREATININE-BSD FRML MDRD: ABNORMAL ML/MIN/{1.73_M2}
GFR SERPL CREATININE-BSD FRML MDRD: ABNORMAL ML/MIN/{1.73_M2}
GLUCOSE BLD-MCNC: 95 MG/DL (ref 70–99)
HCT VFR BLD CALC: 40.7 % (ref 40.7–50.3)
HEMOGLOBIN: 13.8 G/DL (ref 13–17)
INR BLD: 1.1
MCH RBC QN AUTO: 33.3 PG (ref 25.2–33.5)
MCHC RBC AUTO-ENTMCNC: 33.9 G/DL (ref 28.4–34.8)
MCV RBC AUTO: 98.1 FL (ref 82.6–102.9)
NRBC AUTOMATED: 0 PER 100 WBC
PARTIAL THROMBOPLASTIN TIME: 26.5 SEC (ref 23.9–33.8)
PDW BLD-RTO: 14.6 % (ref 11.8–14.4)
PLATELET # BLD: ABNORMAL K/UL (ref 138–453)
PLATELET, FLUORESCENCE: 100 K/UL (ref 138–453)
PLATELET, IMMATURE FRACTION: 5.4 % (ref 1.1–10.3)
PMV BLD AUTO: ABNORMAL FL (ref 8.1–13.5)
POTASSIUM SERPL-SCNC: 3.7 MMOL/L (ref 3.7–5.3)
PROTHROMBIN TIME: 14 SEC (ref 11.5–14.2)
RBC # BLD: 4.15 M/UL (ref 4.21–5.77)
SODIUM BLD-SCNC: 135 MMOL/L (ref 135–144)
WBC # BLD: 5.9 K/UL (ref 3.5–11.3)

## 2021-04-06 PROCEDURE — 2500000003 HC RX 250 WO HCPCS

## 2021-04-06 PROCEDURE — 99152 MOD SED SAME PHYS/QHP 5/>YRS: CPT

## 2021-04-06 PROCEDURE — 85730 THROMBOPLASTIN TIME PARTIAL: CPT

## 2021-04-06 PROCEDURE — 36561 INSERT TUNNELED CV CATH: CPT

## 2021-04-06 PROCEDURE — 85027 COMPLETE CBC AUTOMATED: CPT

## 2021-04-06 PROCEDURE — 2500000003 HC RX 250 WO HCPCS: Performed by: RADIOLOGY

## 2021-04-06 PROCEDURE — 6360000002 HC RX W HCPCS: Performed by: RADIOLOGY

## 2021-04-06 PROCEDURE — 6360000002 HC RX W HCPCS

## 2021-04-06 PROCEDURE — 80048 BASIC METABOLIC PNL TOTAL CA: CPT

## 2021-04-06 PROCEDURE — 85055 RETICULATED PLATELET ASSAY: CPT

## 2021-04-06 PROCEDURE — 2709999900 IR PORT PLACEMENT > 5 YEARS

## 2021-04-06 PROCEDURE — 36415 COLL VENOUS BLD VENIPUNCTURE: CPT

## 2021-04-06 PROCEDURE — 77001 FLUOROGUIDE FOR VEIN DEVICE: CPT

## 2021-04-06 PROCEDURE — 85610 PROTHROMBIN TIME: CPT

## 2021-04-06 RX ORDER — CEFAZOLIN SODIUM 2 G/50ML
2000 SOLUTION INTRAVENOUS ONCE
Status: COMPLETED | OUTPATIENT
Start: 2021-04-06 | End: 2021-04-06

## 2021-04-06 RX ORDER — LIDOCAINE HYDROCHLORIDE 10 MG/ML
INJECTION, SOLUTION EPIDURAL; INFILTRATION; INTRACAUDAL; PERINEURAL
Status: COMPLETED | OUTPATIENT
Start: 2021-04-06 | End: 2021-04-06

## 2021-04-06 RX ORDER — HEPARIN SODIUM,PORCINE 10 UNIT/ML
VIAL (ML) INTRAVENOUS
Status: COMPLETED | OUTPATIENT
Start: 2021-04-06 | End: 2021-04-06

## 2021-04-06 RX ORDER — MIDAZOLAM HYDROCHLORIDE 1 MG/ML
INJECTION INTRAMUSCULAR; INTRAVENOUS
Status: COMPLETED | OUTPATIENT
Start: 2021-04-06 | End: 2021-04-06

## 2021-04-06 RX ORDER — FENTANYL CITRATE 50 UG/ML
INJECTION, SOLUTION INTRAMUSCULAR; INTRAVENOUS
Status: COMPLETED | OUTPATIENT
Start: 2021-04-06 | End: 2021-04-06

## 2021-04-06 RX ADMIN — LIDOCAINE HYDROCHLORIDE 4 ML: 10 INJECTION, SOLUTION EPIDURAL; INFILTRATION; INTRACAUDAL; PERINEURAL at 13:53

## 2021-04-06 RX ADMIN — SODIUM CHLORIDE, PRESERVATIVE FREE 3 ML: 5 INJECTION INTRAVENOUS at 14:02

## 2021-04-06 RX ADMIN — FENTANYL CITRATE 50 MCG: 50 INJECTION, SOLUTION INTRAMUSCULAR; INTRAVENOUS at 13:53

## 2021-04-06 RX ADMIN — MIDAZOLAM 1 MG: 1 INJECTION INTRAMUSCULAR; INTRAVENOUS at 13:53

## 2021-04-06 RX ADMIN — LIDOCAINE HYDROCHLORIDE 18 ML: 10 INJECTION, SOLUTION EPIDURAL; INFILTRATION; INTRACAUDAL; PERINEURAL at 13:53

## 2021-04-06 RX ADMIN — CEFAZOLIN SODIUM 2000 MG: 2 SOLUTION INTRAVENOUS at 13:50

## 2021-04-06 ASSESSMENT — PAIN DESCRIPTION - ONSET: ONSET: ON-GOING

## 2021-04-06 ASSESSMENT — PAIN DESCRIPTION - ORIENTATION: ORIENTATION: MID

## 2021-04-06 ASSESSMENT — PAIN DESCRIPTION - LOCATION: LOCATION: CHEST

## 2021-04-06 NOTE — PROGRESS NOTES
Inpatients must meet criteria 1 through 7.   1. Minimum 30 minutes after last dose of sedative medication, minimum 120 minutes after last dose of reversal agent. Yes   2. Systolic BP stable within 20 mmHg for 30 minutes & systolic BP between 90 & 947 or within 10 mmHg of baseline. Yes   3. Pulse between 60 and 100 or within 10 bpm of baseline. Yes   4. Spontaneous respiratory rate >/= 10 per minute. Yes   5. SaO2 >/= 95 or >/= baseline. Yes   6. Able to cough and swallow or return to baseline function. Yes   7. Alert and oriented or return to baseline mental status. Yes   8. Demonstrates controlled, coordinated movements, ambulates with steady gait, or return to baseline activity function. Yes   9. Minimal or no pain or nausea, or at a level tolerable and acceptable to patient. Yes   10. Takes and retains oral fluids as allowed. Yes   11. Procedural / perioperative site stable. Minimal or no bleeding. Yes   12. If GI endoscopy procedure, minimal or no abdominal distention or passing flatus. N/A   13. Written discharge instructions and emergency telephone number provided. Yes   14. Accompanied by a responsible adult. Yes   Adult patient discharged from facility without responsible person meets above criteria plus the following:   a) remains awake without stimulus for 30 minutes   b) oriented appropriate for age   c) all vital signs stable   d) no significant risk of losing protective reflexes   e) able to maintain pre-procedure mobility without assistance   f) no nausea or dizziness   g) transportation arrangements that do not require patient to operate motor Vehicle.    Yes

## 2021-04-06 NOTE — PROGRESS NOTES
Patient returned to pre/post area. Alert and oriented, denies pain. Dressing to right chest remains clean, dry, and intact. Will continue to monitor.

## 2021-04-07 ENCOUNTER — HOSPITAL ENCOUNTER (OUTPATIENT)
Dept: PET IMAGING | Age: 51
Discharge: HOME | End: 2021-04-09
Payer: COMMERCIAL

## 2021-04-07 ENCOUNTER — TELEPHONE (OUTPATIENT)
Dept: RADIATION ONCOLOGY | Age: 51
End: 2021-04-07

## 2021-04-07 ENCOUNTER — TELEPHONE (OUTPATIENT)
Dept: ONCOLOGY | Age: 51
End: 2021-04-07

## 2021-04-07 ENCOUNTER — HOSPITAL ENCOUNTER (EMERGENCY)
Age: 51
Discharge: HOME | End: 2021-04-07
Attending: EMERGENCY MEDICINE
Payer: COMMERCIAL

## 2021-04-07 VITALS
WEIGHT: 199 LBS | BODY MASS INDEX: 26.99 KG/M2 | HEART RATE: 84 BPM | RESPIRATION RATE: 18 BRPM | OXYGEN SATURATION: 99 % | SYSTOLIC BLOOD PRESSURE: 158 MMHG | TEMPERATURE: 98.3 F | DIASTOLIC BLOOD PRESSURE: 76 MMHG

## 2021-04-07 DIAGNOSIS — R04.2 HEMOPTYSIS: Primary | ICD-10-CM

## 2021-04-07 DIAGNOSIS — C32.9 LARYNGEAL CANCER (HCC): ICD-10-CM

## 2021-04-07 DIAGNOSIS — K27.9 PEPTIC ULCER DISEASE: ICD-10-CM

## 2021-04-07 LAB
ABSOLUTE EOS #: 0.12 K/UL (ref 0–0.44)
ABSOLUTE IMMATURE GRANULOCYTE: 0 K/UL (ref 0–0.3)
ABSOLUTE LYMPH #: 0.47 K/UL (ref 1.1–3.7)
ABSOLUTE MONO #: 0.94 K/UL (ref 0.1–1.2)
ANION GAP SERPL CALCULATED.3IONS-SCNC: 12 MMOL/L (ref 9–17)
BASOPHILS # BLD: 0 % (ref 0–2)
BASOPHILS ABSOLUTE: 0 K/UL (ref 0–0.2)
BUN BLDV-MCNC: 11 MG/DL (ref 6–20)
BUN/CREAT BLD: 19 (ref 9–20)
CALCIUM SERPL-MCNC: 9.9 MG/DL (ref 8.6–10.4)
CHLORIDE BLD-SCNC: 87 MMOL/L (ref 98–107)
CO2: 27 MMOL/L (ref 20–31)
CREAT SERPL-MCNC: 0.59 MG/DL (ref 0.7–1.2)
DIFFERENTIAL TYPE: ABNORMAL
EOSINOPHILS RELATIVE PERCENT: 2 % (ref 1–4)
GFR AFRICAN AMERICAN: >60 ML/MIN
GFR NON-AFRICAN AMERICAN: >60 ML/MIN
GFR SERPL CREATININE-BSD FRML MDRD: ABNORMAL ML/MIN/{1.73_M2}
GFR SERPL CREATININE-BSD FRML MDRD: ABNORMAL ML/MIN/{1.73_M2}
GLUCOSE BLD-MCNC: 80 MG/DL (ref 70–99)
HCT VFR BLD CALC: 36.4 % (ref 40.7–50.3)
HEMOGLOBIN: 12.4 G/DL (ref 13–17)
IMMATURE GRANULOCYTES: 0 %
LYMPHOCYTES # BLD: 8 % (ref 24–43)
MCH RBC QN AUTO: 33.1 PG (ref 25.2–33.5)
MCHC RBC AUTO-ENTMCNC: 34.1 G/DL (ref 28.4–34.8)
MCV RBC AUTO: 97.1 FL (ref 82.6–102.9)
MONOCYTES # BLD: 16 % (ref 3–12)
MORPHOLOGY: ABNORMAL
NRBC AUTOMATED: 0 PER 100 WBC
PDW BLD-RTO: 14.3 % (ref 11.8–14.4)
PLATELET # BLD: ABNORMAL K/UL (ref 138–453)
PLATELET ESTIMATE: ABNORMAL
PLATELET, FLUORESCENCE: 72 K/UL (ref 138–453)
PLATELET, IMMATURE FRACTION: 8.8 % (ref 1.1–10.3)
PMV BLD AUTO: ABNORMAL FL (ref 8.1–13.5)
POTASSIUM SERPL-SCNC: 3.6 MMOL/L (ref 3.7–5.3)
RBC # BLD: 3.75 M/UL (ref 4.21–5.77)
RBC # BLD: ABNORMAL 10*6/UL
SEG NEUTROPHILS: 74 % (ref 36–65)
SEGMENTED NEUTROPHILS ABSOLUTE COUNT: 4.37 K/UL (ref 1.5–8.1)
SODIUM BLD-SCNC: 126 MMOL/L (ref 135–144)
WBC # BLD: 5.9 K/UL (ref 3.5–11.3)
WBC # BLD: ABNORMAL 10*3/UL

## 2021-04-07 PROCEDURE — A9552 F18 FDG: HCPCS | Performed by: INTERNAL MEDICINE

## 2021-04-07 PROCEDURE — 85055 RETICULATED PLATELET ASSAY: CPT

## 2021-04-07 PROCEDURE — C9113 INJ PANTOPRAZOLE SODIUM, VIA: HCPCS | Performed by: EMERGENCY MEDICINE

## 2021-04-07 PROCEDURE — 2580000003 HC RX 258: Performed by: EMERGENCY MEDICINE

## 2021-04-07 PROCEDURE — 3430000000 HC RX DIAGNOSTIC RADIOPHARMACEUTICAL: Performed by: INTERNAL MEDICINE

## 2021-04-07 PROCEDURE — 96365 THER/PROPH/DIAG IV INF INIT: CPT

## 2021-04-07 PROCEDURE — 78815 PET IMAGE W/CT SKULL-THIGH: CPT

## 2021-04-07 PROCEDURE — 6360000002 HC RX W HCPCS: Performed by: EMERGENCY MEDICINE

## 2021-04-07 PROCEDURE — 99283 EMERGENCY DEPT VISIT LOW MDM: CPT

## 2021-04-07 PROCEDURE — 80048 BASIC METABOLIC PNL TOTAL CA: CPT

## 2021-04-07 PROCEDURE — 85025 COMPLETE CBC W/AUTO DIFF WBC: CPT

## 2021-04-07 RX ORDER — FLUDEOXYGLUCOSE F 18 200 MCI/ML
15.6 INJECTION, SOLUTION INTRAVENOUS
Status: COMPLETED | OUTPATIENT
Start: 2021-04-07 | End: 2021-04-07

## 2021-04-07 RX ORDER — PANTOPRAZOLE SODIUM 40 MG/1
40 TABLET, DELAYED RELEASE ORAL
Status: DISCONTINUED | OUTPATIENT
Start: 2021-04-08 | End: 2021-04-07 | Stop reason: HOSPADM

## 2021-04-07 RX ADMIN — FLUDEOXYGLUCOSE F 18 15.6 MILLICURIE: 200 INJECTION, SOLUTION INTRAVENOUS at 10:31

## 2021-04-07 RX ADMIN — PANTOPRAZOLE SODIUM 80 MG: 40 INJECTION, POWDER, FOR SOLUTION INTRAVENOUS at 14:13

## 2021-04-07 ASSESSMENT — ENCOUNTER SYMPTOMS
ABDOMINAL PAIN: 0
SHORTNESS OF BREATH: 0
ABDOMINAL DISTENTION: 0
CONSTIPATION: 0
COUGH: 0
DIARRHEA: 0
TROUBLE SWALLOWING: 0
VOMITING: 1
BACK PAIN: 0
NAUSEA: 1

## 2021-04-07 ASSESSMENT — PAIN DESCRIPTION - PAIN TYPE: TYPE: ACUTE PAIN

## 2021-04-07 ASSESSMENT — PAIN DESCRIPTION - DESCRIPTORS: DESCRIPTORS: NAGGING

## 2021-04-07 ASSESSMENT — PAIN DESCRIPTION - LOCATION: LOCATION: CHEST

## 2021-04-07 NOTE — ED PROVIDER NOTES
[facility] ED  EMERGENCY DEPARTMENT ENCOUNTER      Pt Name:John Beaulieu  MRN: [id number]  [place] 1970  Date of evaluation: 4/7/2021  Provider: Lisa Padgett MD    CHIEF COMPLAINT     Chief Complaint   Patient presents with    Hematemesis     diagnosed with throat cancer March 17, Port inserted yesterday for scheduled chemo    Melena    Chest Pain         HISTORY OF PRESENT ILLNESS   (Location/Symptom, Timing/Onset, Context/Setting, Quality, Duration, Modifying Factors, Severity)  Note limiting factors. HPI the patient is a 15-year-old male who has recently been diagnosed with throat cancer. This was March 17 of this year. He had a port inserted yesterday so that he can start his chemotherapy. He reports that he has melena, chest pain and vomited a large amount of blood. He is not dizzy or weak. He is not confused. He is not having pain. He does have a history of a bleeding gastric ulcer. Nursing Notes were reviewed. REVIEW OF SYSTEMS    (2-9 systems for level 4, 10 or more for level 5)     Review of Systems   Constitutional: Positive for activity change and appetite change. Negative for diaphoresis, fatigue and fever. HENT: Negative for congestion and trouble swallowing. Eyes: Negative for visual disturbance. Respiratory: Negative for cough and shortness of breath. Cardiovascular: Negative for chest pain, palpitations and leg swelling. Gastrointestinal: Positive for nausea and vomiting. Negative for abdominal distention, abdominal pain, constipation and diarrhea. Genitourinary: Negative for difficulty urinating. Musculoskeletal: Negative for back pain and gait problem. Skin: Negative. Neurological: Negative for dizziness, light-headedness and headaches. Psychiatric/Behavioral: Negative for confusion, decreased concentration and dysphoric mood.               MEDICAL HISTORY     Past Medical History:   Diagnosis Date    Alcohol abuse     Anxiety  [facility]     COPD (chronic obstructive pulmonary disease) (HCC)     COPD [facility]chronic obstructive pulmonary disease) ([facility]     Depression     Head injury with loss of consciousness ([facility]     Headache     Hypertension     Liver disease     Migraine          SURGICAL HISTORY       Past Surgical History:   Procedure Laterality Date    ANKLE SURGERY  age 10    left    ENDOSCOPY, COLON, DIAGNOSTIC      IR PORT PLACEMENT EQUAL OR GREATER THAN 5 YEARS  4/6/2021    IR PORT PLACEMENT EQUAL OR GREATER THAN 5 YEARS 4/6/2021 [facility] SPECIAL PROCEDURES    UPPER GASTROINTESTINAL ENDOSCOPY N/A 9/17/2019    EGD BIOPSY performed by Yumiko Harrison MD at [address] [facility]  3/16/2021    [address] [facility] 3/16/2021 [id number] [facility]       Current Discharge Medication List      CONTINUE these medications which have NOT CHANGED    Details   acetaminophen (TYLENOL) 325 MG tablet Take 2 tablets by mouth every 6 hours as needed for Pain  Qty: 60 tablet, Refills: 0      risperiDONE (RISPERDAL) 2 MG tablet Take 1 tablet by mouth 2 times daily  Qty: 60 tablet, Refills: 3      folic acid (FOLVITE) 1 MG tablet Take 1 tablet by mouth daily  Qty: 30 tablet, Refills: 5      vitamin B-1 (THIAMINE) 100 MG tablet Take 1 tablet by mouth daily  Qty: 30 tablet, Refills: 5    Associated Diagnoses: Wernicke-Korsakoff psychosis (HCC)      loratadine (CLARITIN) 10 MG tablet Take 1 tablet by mouth daily  Qty: 30 tablet, Refills: 0    Associated Diagnoses: Allergic rhinitis, unspecified seasonality, unspecified trigger      hydrOXYzine (ATARAX) 25 MG tablet Take 25 mg by mouth 3 times daily as needed for Itching      Multiple Vitamins-Minerals (THERAPEUTIC MULTIVITAMIN-MINERALS) tablet Take 1 tablet by mouth daily OTC with iron  Qty: 30 tablet, Refills: 3    Associated Diagnoses: Seizure disorder ([facility]             ALLERGIES     Patient has no known allergies.     FAMILY HISTORY       Family History Problem Relation Age of Onset    High Blood Pressure Mother     Diabetes Mother           SOCIAL HISTORY       Social History     Socioeconomic History    Marital status: Single     Spouse name: None    Number of children: None    Years of education: None    Highest education level: None   Occupational History    None   Social Needs    Financial resource strain: None    Food insecurity     Worry: None     Inability: None    Transportation needs     Medical: None     Non-medical: None   Tobacco Use    Smoking status: Current Every Day Smoker     Packs/day: 2.00    Smokeless tobacco: Never Used   Substance and Sexual Activity    Alcohol use: Yes     Comment: 12 beers daily    Drug use: Not Currently     Types: Marijuana    Sexual activity: None   Lifestyle    Physical activity     Days per week: None     Minutes per session: None    Stress: None   Relationships    Social connections     Talks on phone: None     Gets together: None     Attends Mandaeism service: None     Active member of club or organization: None     Attends meetings of clubs or organizations: None     Relationship status: None    Intimate partner violence     Fear of current or ex partner: None     Emotionally abused: None     Physically abused: None     Forced sexual activity: None   Other Topics Concern    None   Social History Narrative    None       SCREENINGS    [place] Coma Scale  Eye Opening: Spontaneous  Best Verbal Response: Oriented  Best Motor Response: Obeys commands  [place] Coma Scale Score: 15        PHYSICAL EXAM  (up to 7 for level 4, 8 or more for level 5)     ED Triage Vitals [04/07/21 1253]   BP Temp Temp src Pulse Resp SpO2 Height Weight   (!) 167/84 98.3 °F (36.8 °C) -- 84 18 100 % -- 199 lb (90.3 kg)       Physical Exam  Constitutional:       Appearance: Normal appearance. He is normal weight. HENT:      Head: Normocephalic and atraumatic.       Mouth/Throat:      Mouth: Mucous membranes are moist. Pharynx: Oropharynx is clear. Eyes:      Extraocular Movements: Extraocular movements intact. Conjunctiva/sclera: Conjunctivae normal.      Pupils: Pupils are equal, round, and reactive to light. Neck:      Musculoskeletal: Normal range of motion and neck supple. Cardiovascular:      Rate and Rhythm: Normal rate and regular rhythm. Pulses: Normal pulses. Heart sounds: Normal heart sounds. Pulmonary:      Effort: Pulmonary effort is normal.      Breath sounds: Normal breath sounds. Abdominal:      General: Abdomen is flat. Bowel sounds are normal. There is no distension. Palpations: Abdomen is soft. Tenderness: There is no abdominal tenderness. There is no guarding or rebound. Musculoskeletal: Normal range of motion. Skin:     General: Skin is warm and dry. Neurological:      General: No focal deficit present. Mental Status: He is alert and oriented to person, place, and time. Mental status is at baseline. Psychiatric:         Mood and Affect: Mood normal.         Behavior: Behavior normal.         Thought Content:  Thought content normal.         Judgment: Judgment normal.         DIAGNOSTIC RESULTS     EKG: All EKG's are interpreted by the Emergency Department Physician whoeither signs or Co-signs this chart in the absence of a cardiologist.      RADIOLOGY:   Non-plain film images such as CT, Ultrasound and MRI are read by the radiologist. Plain radiographic images are visualized and preliminarily interpreted by the emergency physician     Interpretation per the Radiologist below, if available at the time of this note:    No orders to display         ED BEDSIDE ULTRASOUND:   Performed by ED Physician - none    LABS:  Labs Reviewed   CBC WITH AUTO DIFFERENTIAL - Abnormal; Notable for the following components:       Result Value    RBC 3.75 (*)     Hemoglobin 12.4 (*)     Hematocrit 36.4 (*)     All other components within normal limits   BASIC METABOLIC PANEL - Abnormal; Notable for the following components:    CREATININE 0.59 (*)     Sodium 126 (*)     Potassium 3.6 (*)     Chloride 87 (*)     All other components within normal limits   IMMATURE PLATELET FRACTION       EMERGENCY DEPARTMENT COURSE and DIFFERENTIAL DIAGNOSIS/MDM:   Vitals:    Vitals:    04/07/21 1315 04/07/21 1330 04/07/21 1345 04/07/21 1400   BP: (!) 145/90 (!) 149/81 (!) 151/77 (!) 144/87   Pulse:       Resp:       Temp:       SpO2: 99% 99% 98% 98%   Weight:               MDM the patient has been advised that he needs transfer to a higher level of care hospital that has GI and pulmonary. I have explained to him that I am concerned that he could have a major bleed which would have potential for severe complications and death. He is insistent that he does not want to be transferred. He wants to have his chemotherapy tomorrow. AMA form has been filled out. Katarina Lord, the RN, witnessed the signing the AMA form. CONSULTS:  None    PROCEDURES:  Unless otherwise noted below, none     Procedures    FINAL IMPRESSION      1. Hemoptysis    2. Peptic ulcer disease          DISPOSITION/PLAN   DISPOSITION        PATIENT REFERRED TO:  Yun Hollingsworth MD  [address]   [facility] [address] [facility] [place] [address] [phone]    Schedule an appointment as soon as possible for a visit         DISCHARGE MEDICATIONS:  Current Discharge Medication List                 (Please note that portions ofthis note were completed with a voice recognition program.  Efforts were made to edit the dictations but occasionally words are mis-transcribed.)      Mari Bragg MD (electronically signed)  Attending Emergency Physician          Curtis Mobley MD  04/07/21 Александр Blevins MD  04/07/21 1420

## 2021-04-07 NOTE — TELEPHONE ENCOUNTER
Name: Francis Kahn  : 1970  MRN: [id number]    Oncology Navigation Follow-Up Note    Contact Type:  Telephone  Notes: Pt left VM stating unable to schedule transportation for Dr. Dottie Lanza  consultation. Carline Wood, [facility] , updated & requested contact pt to assist with transportation issues. Will continue to follow.     Electronically signed by Mike Rodriguez RN on 2021 at 8:07 AM

## 2021-04-08 ENCOUNTER — TELEPHONE (OUTPATIENT)
Dept: GASTROENTEROLOGY | Age: 51
End: 2021-04-08

## 2021-04-08 ENCOUNTER — HOSPITAL ENCOUNTER (OUTPATIENT)
Dept: RADIATION ONCOLOGY | Age: 51
Discharge: HOME | End: 2021-04-08
Payer: COMMERCIAL

## 2021-04-08 ENCOUNTER — TELEPHONE (OUTPATIENT)
Dept: ONCOLOGY | Age: 51
End: 2021-04-08

## 2021-04-08 VITALS
DIASTOLIC BLOOD PRESSURE: 89 MMHG | TEMPERATURE: 98 F | OXYGEN SATURATION: 99 % | SYSTOLIC BLOOD PRESSURE: 136 MMHG | HEART RATE: 96 BPM | RESPIRATION RATE: 18 BRPM | BODY MASS INDEX: 26.39 KG/M2 | WEIGHT: 194.6 LBS

## 2021-04-08 DIAGNOSIS — C32.9 LARYNGEAL CANCER (HCC): Primary | ICD-10-CM

## 2021-04-08 PROCEDURE — 99204 OFFICE O/P NEW MOD 45 MIN: CPT | Performed by: RADIOLOGY

## 2021-04-08 PROCEDURE — 99213 OFFICE O/P EST LOW 20 MIN: CPT | Performed by: RADIOLOGY

## 2021-04-08 RX ORDER — SUCRALFATE ORAL 1 G/10ML
1 SUSPENSION ORAL 4 TIMES DAILY
Qty: 420 ML | Refills: 3 | Status: SHIPPED | OUTPATIENT
Start: 2021-04-08

## 2021-04-08 RX ORDER — OMEPRAZOLE 20 MG/1
20 CAPSULE, DELAYED RELEASE ORAL DAILY
Qty: 30 CAPSULE | Refills: 3 | Status: SHIPPED | OUTPATIENT
Start: 2021-04-08

## 2021-04-08 ASSESSMENT — PAIN DESCRIPTION - LOCATION: LOCATION: NECK;SHOULDER

## 2021-04-08 NOTE — PROGRESS NOTES
presented to the ED. Patient underwent a chest x-ray which showed acute pulmonary processes though he had a CT of the chest for PE which showed no pulmonary embolism but noted abnormal lymph nodes in the left side of the neck. Patient underwent a CT scan of the neck on March 15, 2021 which showed a 3.3 cm solid mass at the left piriform recess invading the superior margin of the right lobe of the thyroid gland as well as left thyroid cartilage along with the left paravertebral spaces from C4-C7. There was left-sided level three and four lymphadenopathy. Patient underwent ultrasound-guided biopsy the next day which confirmed a squamous cell carcinoma. Patient was seen by oncology who recommended patient undergo a PET scan which was done on April 7, 2021. Patient's PET scan showed uptake in the neck as well as potentially small pulmonary nodules at the base and mild uptake at the fundus of the stomach. Patient does report having significant abdominal pain as well as continued hematoemesis and melena. Patient does have a history of a bleeding gastric ulcer. Patient last had a EGD in 2019. Patient of note currently lives in [address] [place] [address] and does not have his own transportation. Patient does continue to smoke as well as drink alcohol despite his symptoms. He does have trouble swallowing solid foods and has had a documented weight loss of 5 pounds in the past week. Denies any other symptoms of headaches, dizziness, vision changes, numbness, weakness, chest pain, shortness of breath, bony pain, swelling.       PRIOR RADIATION HISTORY:  No prior history of radiation therapy    PACEMAKER: None    PAST MEDICAL HISTORY:  Past Medical History:   Diagnosis Date    Alcohol abuse     Anxiety     Cancer ([address]     COPD (chronic obstructive pulmonary disease) ([address]     COPD (chronic obstructive pulmonary disease) ([address]     Depression     Head injury with loss of consciousness ([address]     Headache     Hypertension  Liver disease     Migraine        PAST SURGICAL HISTORY:  Past Surgical History:   Procedure Laterality Date    ANKLE SURGERY  age 10    left    ENDOSCOPY, COLON, DIAGNOSTIC      IR PORT PLACEMENT EQUAL OR GREATER THAN 5 YEARS  4/6/2021    IR PORT PLACEMENT EQUAL OR GREATER THAN 5 YEARS 4/6/2021 [facility] SPECIAL PROCEDURES    UPPER GASTROINTESTINAL ENDOSCOPY N/A 9/17/2019    EGD BIOPSY performed by Jasper Messer MD at [address] [facility]  3/16/2021    [address] BIOPSY 3/16/2021 [facility]       MEDICATIONS:    Current Outpatient Medications:     omeprazole (PRILOSEC) 20 MG delayed release capsule, Take 1 capsule by mouth daily, Disp: 30 capsule, Rfl: 3    sucralfate (CARAFATE) 1 GM/10ML suspension, Take 10 mLs by mouth 4 times daily, Disp: 420 mL, Rfl: 3    ALLERGIES:   No Known Allergies    SOCIAL HISTORY:  Social History     Socioeconomic History    Marital status: Single     Spouse name: Not on file    Number of children: Not on file    Years of education: Not on file    Highest education level: Not on file   Occupational History    Not on file   Social Needs    Financial resource strain: Not on file    Food insecurity     Worry: Not on file     Inability: Not on file    Transportation needs     Medical: Not on file     Non-medical: Not on file   Tobacco Use    Smoking status: Current Every Day Smoker     Packs/day: 2.00    Smokeless tobacco: Never Used   Substance and Sexual Activity    Alcohol use: Yes     Comment: 12 beers daily    Drug use: Not Currently     Types: Marijuana    Sexual activity: Not on file   Lifestyle    Physical activity     Days per week: Not on file     Minutes per session: Not on file    Stress: Not on file   Relationships    Social connections     Talks on phone: Not on file     Gets together: Not on file     Attends Nondenominational service: Not on file     Active member of club or organization: Not on file     Attends meetings of clubs or Symptomatic but completely ambulatory    VITAL SIGNS: /89   Pulse 96   Temp 98 °F (36.7 °C)   Resp 18   Wt 194 lb 9.6 oz (88.3 kg)   SpO2 99%   BMI 26.39 kg/m²   GENERAL:  General appearance is that of a well-nourished, well-developed in no apparent distress. HEENT: Normocephalic, atraumatic, EOMI, moist mucosa, no erythema. NECK:  (+) trachea is midline. LYMPHATICS: (+) L neck cervical and supraclavicular adenopathy. HEART:  Regular rate and rhythm, S1, S2, no murmurs. LUNGS:  Clear to auscultation bilaterally with no wheezing or crackles. ABDOMEN:  Soft, nontender, non distended, and no hepatosplenomegaly. EXTREMITIES:  No clubbing, cyanosis, or edema. No calf tenderness. MSK:  No CVA or spinal tenderness. NEUROLOGICAL: No focal deficits. CN II-XII intact. Strength and sensation intact bilaterally. SKIN: No erythema or desquamation. LABS:  WBC   Date Value Ref Range Status   2021 5.9 3.5 - 11.3 k/uL Final     Segs Absolute   Date Value Ref Range Status   2021 4.37 1.50 - 8.10 k/uL Final     Hemoglobin   Date Value Ref Range Status   2021 12.4 (L) 13.0 - 17.0 g/dL Final     Platelets   Date Value Ref Range Status   2021 See Reflexed IPF Result 138 - 453 k/uL Final       IMAGIN/7/21 PET Scan  Impression   1.  Area of significant FDG uptake along the left side of the larynx   measuring 3.2 x 2.2 cm consistent with known laryngeal carcinoma.  Moderate   uptake within both submandibular glands.       2.  Multiple lymph nodes with abnormal FDG uptake in the left anterior   triangle of the neck extending from the larynx distally to the thoracic inlet   consistent with neoplasia.       3.  Thickened gastric mucosa in the fundus of the stomach with mild FDG   uptake likely related to inflammation.          PATHOLOGY:   3/16/21 Biopsy  -- Diagnosis --   LYMPH NODE, LEFT NECK, ULTRASOUND-GUIDED NEEDLE CORE BIOPSIES:-   METASTATIC, POORLY DIFFERENTIATED BASALOID blood counts (causing infection or bleeding), sore throat, change in taste, hair loss in treated area and dryness of the mouth, dysphagia leading to feeding tube placement which may be permanent. Possible long-term side effects discussed included hyperpigmentation of the skin, increased firmness of the tissues of the neck, permanent dryness of the mouth, permanent change in taste, damage to the nerves (causing numbness, weakness, or paralysis), damage to the brain, damage to the bone (causing necrosis), trismus, hearing loss, decreased thyroid function, and scarring of the lung (causing shortness of breath/cough). Patient was advised on smoking cessation, as it can make toxicities worse during treatment and increase risk of other cancers. After ample time to review the patient's questions, patient will be referred to radiation oncology in [facility] as well as be set up for medical oncology for his concurrent chemotherapy in [place]. Patient will be prescribed Carafate and Prilosec to help with his gastric symptoms and be referred to GI for evaluation for PEG placement as well as his hematemesis. Patient was in agreement with my recommendations. All questions were answered to their satisfaction. Patient was advised to contact us anytime should they have any questions or concerns. I spent greater than 60 minutes counseling and evaluating the different treatment options available to the patient and formulating a plan of action today.       Jayda Brenner MD  Electronically signed by Jayda Brenner MD on 4/8/21 at 3:38 PM EDT      Drugs Prescribed:  New Prescriptions    OMEPRAZOLE (PRILOSEC) 20 MG DELAYED RELEASE CAPSULE    Take 1 capsule by mouth daily    SUCRALFATE (CARAFATE) 1 GM/10ML SUSPENSION    Take 10 mLs by mouth 4 times daily       Orders Placed:     Orders Placed This Encounter   Procedures   Albert Fagan MD, Gastroenterology, [facility]ella Villanueva External Referral To Radiation Oncology CC:  Patient Care Team:  Yun Hollingsworth MD as PCP - General (Family Medicine)  Roddy Galindo MD as Consulting Physician (Oncology)  Mitchell Avalos RN as Nurse Navigator (Oncology)

## 2021-04-08 NOTE — PROGRESS NOTES
Referring Physician: Dr Adair Torres:    04/08/21 1330   BP: 136/89   Pulse: 96   Resp: 18   Temp: 98 °F (36.7 °C)   SpO2: 99%    :  Patient Currently in Pain: Yes  Pain Assessment: 0-10  Pain Level: 5       Wt Readings from Last 1 Encounters:   04/08/21 194 lb 9.6 oz (88.3 kg)        Body mass index is 26.39 kg/m². Immunizations:    Influenza status:    []   Current   [x]   Patient declined    Pneumococcal status:  []   Current  [x]   Patient declined  Covid status:   []  Dose #1:                     []  Dose #2:               [x]   Patient declined    Smoking Status:    [x] Smoker - PPD:1   [] Nonsmoker - Quit Date:               [] Never a smoker      No chief complaint on file. Cancer Staging  No matching staging information was found for the patient. Prior Radiation Therapy? No   If yes, site treated:   Facility:                             Date:    Concurrent Chemo/radiation? No   If yes, start date:    Prior Chemotherapy? No   If yes    Facility:                             Date:    Prior Hormonal Therapy? No   If yes   Facility:                             Date:    Head and Neck Cancer? Yes   If yes, please remind physician to place swallow study order and speech therapy order. Pacemaker/Defibulator/ICD:  No                     No current outpatient medications on file. No current facility-administered medications for this encounter.         Past Medical History:   Diagnosis Date    Alcohol abuse     Anxiety     Cancer ([address]     [facility]     COPD (chronic obstructive pulmonary disease) (HCC)     Depression     Head [facility] with loss of consciousness ([facility]     Headache     Hypertension     Liver disease     Migraine        Past Surgical History:   Procedure Laterality Date    ANKLE SURGERY  age 10    left    ENDOSCOPY, COLON, DIAGNOSTIC      IR PORT PLACEMENT EQUAL OR GREATER THAN 5 YEARS  4/6/2021    IR PORT PLACEMENT EQUAL OR GREATER THAN 5 YEARS 4/6/2021 [facility] SPECIAL PROCEDURES    UPPER GASTROINTESTINAL ENDOSCOPY N/A 9/17/2019    EGD BIOPSY performed by Mira Can MD at [address] [facility]  3/16/2021     LYMPH NODE BIOPSY 3/16/2021 [facility] ULTRASOUND       Family History   Problem Relation Age of Onset    High Blood Pressure Mother     Diabetes Mother        Social History     Socioeconomic History    Marital status: Single     Spouse name: Not on file    Number of children: Not on file    Years of education: Not on file    Highest education level: Not on file   Occupational History    Not on file   Social Needs    Financial resource strain: Not on file    Food insecurity     Worry: Not on file     Inability: Not on file    Transportation needs     Medical: Not on file     Non-medical: Not on file   Tobacco Use    Smoking status: Current Every Day Smoker     Packs/day: 2.00    Smokeless tobacco: Never Used   Substance and Sexual Activity    Alcohol use: Yes     Comment: 12 beers daily    Drug use: Not Currently     Types: Marijuana    Sexual activity: Not on file   Lifestyle    Physical activity     Days per week: Not on file     Minutes per session: Not on file    Stress: Not on file   Relationships    Social connections     Talks on phone: Not on file     Gets together: Not on file     Attends Congregational service: Not on file     Active member of club or organization: Not on file     Attends meetings of clubs or organizations: Not on file     Relationship status: Not on file    Intimate partner violence     Fear of current or ex partner: Not on file     Emotionally abused: Not on file     Physically abused: Not on file     Forced sexual activity: Not on file   Other Topics Concern    Not on file   Social History Narrative    Not on file             FALLS RISK SCREEN  Instructions:  Assess the patient and enter the appropriate indicators that are present for fall risk identification. Total the numbers entered and assign a fall risk score from Table 2.  Reassess patient at a minimum every 12 weeks or with status change. Assessment   Date  4/8/2021     1. Mental Ability: confusion/cognitively impaired 0     2. Elimination Issues: incontinence, frequency 0       3. Ambulatory: use of assistive devices (walker, cane, off-loading devices),        attached to equipment (IV pole, oxygen) 0     4. Sensory Limitations: dizziness, vertigo, impaired vision 0     5. Age less than 65        0     6. Age 72 or greater 0     7. Medication: diuretics, strong analgesics, hypnotics, sedatives,        antihypertensive agents 0   8. Falls:  recent history of falls within the last 3 months (not to include slipping or        tripping) 0   TOTAL 0    If score of 4 or greater was education given? No       TABLE 2   Risk Score Risk Level Plan of Care   0-3 Little or  No Risk 1. Provide assistance as indicated for ambulation activities  2. Reorient confused/cognitively impaired patient  3. Call-light/bell within patient's reach  4. Chair/bed in low position, stretcher/bed with siderails up except when performing patient care activities  5. Educate patient/family/caregiver on falls prevention  6.  Reassess in 12 weeks or with any noted change in patient condition which places them at a risk for a fall   4-6 Moderate Risk 1. Provide assistance as indicated for ambulation activities  2. Reorient confused/cognitively impaired patient  3. Call-light/bell within patient's reach  4. Chair/bed in low position, stretcher/bed with siderails up except when performing patient care activities  5. Educate patient/family/caregiver on falls prevention     7 or   Higher High Risk 1. Place patient in easily observable treatment room  2. Patient attended at all times by family member or staff  3. Provide assistance as indicated for ambulation activities  4.   Reorient confused/cognitively impaired

## 2021-04-08 NOTE — TELEPHONE ENCOUNTER
Office received referral from [facility] on pt for C32.9 - Laryngeal cancer. Does pt need an office appt or is this for a procedure. Please advise.

## 2021-04-08 NOTE — TELEPHONE ENCOUNTER
Name: Wanda Sarah  : 1970  MRN: [id number]    Oncology Navigation Follow-Up Note    Contact Type:  Telephone   Notes: Dr. Melisa Chan alerted writer pt completed consultation & referral placed to [facility], r/t closer to pt's home & pt's preferance. Dr. Melisa Chan stated Dr. Layton Jo updated & pt to receive chemotherapy @ [facility]. Spoke with Annamaria Irving, [facility] oncology nurse navigator, updated on pt. Annamaria Irving stated will resume oncology navigation.     Electronically signed by Dilip Mcgraw RN on 2021 at 3:01 PM

## 2021-04-09 ENCOUNTER — TELEPHONE (OUTPATIENT)
Dept: RADIATION ONCOLOGY | Age: 51
End: 2021-04-09

## 2021-04-09 DIAGNOSIS — Z45.2 ENCOUNTER FOR CARE RELATED TO VASCULAR ACCESS PORT: ICD-10-CM

## 2021-04-09 DIAGNOSIS — C32.9 LARYNGEAL CANCER (HCC): Primary | ICD-10-CM

## 2021-04-09 RX ORDER — HEPARIN SODIUM (PORCINE) LOCK FLUSH IV SOLN 100 UNIT/ML 100 UNIT/ML
500 SOLUTION INTRAVENOUS PRN
Status: CANCELLED | OUTPATIENT
Start: 2021-04-09

## 2021-04-09 RX ORDER — SODIUM CHLORIDE 0.9 % (FLUSH) 0.9 %
5-40 SYRINGE (ML) INJECTION PRN
Status: CANCELLED | OUTPATIENT
Start: 2021-04-09

## 2021-04-09 RX ORDER — SODIUM CHLORIDE 9 MG/ML
25 INJECTION, SOLUTION INTRAVENOUS PRN
Status: CANCELLED | OUTPATIENT
Start: 2021-04-09

## 2021-04-09 NOTE — TELEPHONE ENCOUNTER
Referral made to Dr. Catarina Friedman in [facility] records faxed with referral with confirmation to [phone].

## 2021-04-12 ENCOUNTER — TELEPHONE (OUTPATIENT)
Dept: CASE MANAGEMENT | Age: 51
End: 2021-04-12

## 2021-04-12 NOTE — TELEPHONE ENCOUNTER
Name: Tiara Linares  : 1970  MRN: [id number]    Oncology Navigation Follow-Up Note  Covering Navigator received call from pt. Asking about chemotherapy schedule in [place] office? Pt. Has already heard from Dr. Hilary Sullivan office with appts. And waiting for chemo schedule. Writer explaining to pt. Will need to go through prior auth process and will assist in monitoring status.   Electronically signed by Dedra Reina RN on 2021 at 2:02 PM

## 2021-04-13 ENCOUNTER — TELEPHONE (OUTPATIENT)
Dept: ONCOLOGY | Age: 51
End: 2021-04-13

## 2021-04-13 NOTE — TELEPHONE ENCOUNTER
Since no specific procedure is listed I think this is a New Pt referral to be evaluated by physician for appropriate treatment.

## 2021-04-13 NOTE — TELEPHONE ENCOUNTER
Jolene Initial Nutrition Assessment    Diana Maria is a 44 y.o.  male     Reason for Evaluation: new hn    Subjective/Current Data:  Called pt on listed phone number r/t new hn ca with plans for PEG and concurrent tx. Noted that pt is going to get treated in [place] now instead of in [place]. RD did submit CMN for oral nutrition supplements TID to aid in improved calorie and protein intake. Pt states he is eating well right now, but understands that the feeding tube will be placed in case PO intakes are no longer safe or pt experiences painful swallowing. Pt indicated no questions or concerns at this time.      Past Medical History:  Past Medical History:   Diagnosis Date    Alcohol abuse     Anxiety     Cancer ([address]     COPD (chronic obstructive pulmonary disease) ([address]     COPD (chronic obstructive pulmonary disease) ([address]     Depression     [facility] of [facility]     Headache     Hypertension     Liver disease     Migraine      Past Surgical History:   Procedure Laterality Date    ANKLE SURGERY  age 10    left    ENDOSCOPY, COLON, DIAGNOSTIC      IR PORT PLACEMENT EQUAL OR GREATER THAN 5 YEARS  4/6/2021    IR PORT PLACEMENT EQUAL OR GREATER THAN 5 YEARS 4/6/2021 [facility] SPECIAL PROCEDURES    UPPER GASTROINTESTINAL ENDOSCOPY N/A 9/17/2019    EGD BIOPSY performed by Jay Babcock MD at [address] [facility] [place]  3/16/2021    US LYMPH NODE BIOPSY 3/16/2021 [facility] ULTRASOUND        Anthropometric Measures:  Current Weight:88.3kg  Ideal Body Weight: 80.9 kg  Ideal Body Weight %: 109%  BMI 26.6 Overweight    Nutritional Requirements:  Estimated Energy Needs:  Weight Used: 88.3kg   Method Used: Jayesh Daniels  Estimated kcal Needs: 2012-0874 kcal per day  Protein Needs:  Weight used: 88.3 kg  1.2-1.4 g/kg = 106-123 g per day    Nutrition-focused Physical Findings   GI symptoms: negative  Skin:  Intact    Nutrition Diagnosis and Goal  Problem: Increased energy & protein needs  Etiology/related to: catabolic illness  Symptoms/Signs/as evidenced by: hn ca with plans for PEG and concurrent RO/chemo       Goal: Adequate intake to aide in wt maintenaince, signs and symptoms management  Progress towards goal: stable  Education Needs: none at present time     Malnutrition Assessment  · Unable to assess  Per AND/ASPEN guidelines, will monitor for additional RD, RN and MD documentation re weight status, nutritional intake, fluid accumulation, possible loss of body fat or muscle mass, or possible reduced  strength. NUTRITION RECOMMENDATIONS / MONITORING / EVALUATION  1. CMN submitted for Boost Plus TID to provide additional 1080 kcal, 45g protein if consumed. 2. Encourage nutrition follow up through [place] 2/2 PEG placement, hn ca.     Alyse Leon, KULDEEPA, RD, LD  Registered Dietitian  Zeferino Delacruz  [phone]

## 2021-04-15 ENCOUNTER — TELEPHONE (OUTPATIENT)
Dept: GASTROENTEROLOGY | Age: 51
End: 2021-04-15

## 2021-04-15 ENCOUNTER — TELEPHONE (OUTPATIENT)
Dept: CASE MANAGEMENT | Age: 51
End: 2021-04-15

## 2021-04-15 NOTE — TELEPHONE ENCOUNTER
Next available appt that was available with Dr Kamari Linares was 5/25/21 @ [facility]. Is this ok?

## 2021-04-15 NOTE — TELEPHONE ENCOUNTER
I'm unsure of the urgency and timeframe they want the patient to be seen in our office. I would contact the nurse navigator. If May is too long, patient can be put with .

## 2021-04-15 NOTE — TELEPHONE ENCOUNTER
Pt called back to scheduled. Not sure when I can get pt in, [facility] can you please review and let us know.

## 2021-04-15 NOTE — TELEPHONE ENCOUNTER
Name: Willian Ford  : 1970  MRN: [id number]    Oncology Navigation Follow-Up Note  Navigator spoke with Carlos Enrique Holliday in [facility] office and updates given . Pt. Wanting to receive RTX at [facility]- Dr. Josephine De La Torre , pt. Scheduled to be seen . Writer clarified with Prabhakar Roy orders have been directed to Cyndi. Writer also spoke with GI office and stressed pt. Needing scheduled and may need EGD-Hx of peptic ulcer-see referral. Staff will review referral and discuss MD and reach out to pt. Carlos Enrique Holliday will send updates to [facility].  And Shanice Bangura   Electronically signed by Helga Paget, RN on 4/15/2021 at 9:00 AM

## 2021-04-15 NOTE — TELEPHONE ENCOUNTER
Received a call from Maegan Sage who is a nurse navigator for [email or web address] on the status of the patient's referral.  Would like the patient scheduled soon so that they can start radiation and chemotherapy. Has a history of peptic ulcers and the referral was for laryngeal cancer. Dr. Pierre Bernard is on this referral.  Juan Springer please advise.

## 2021-04-15 NOTE — TELEPHONE ENCOUNTER
Patient is established with Patric and should be scheduled with him first if possible. If patient wishes to switch to [facility], we can see him today at 1pm or Monday at [facility] at 3pm.

## 2021-04-19 ENCOUNTER — TELEPHONE (OUTPATIENT)
Dept: ONCOLOGY | Age: 51
End: 2021-04-19

## 2021-04-19 NOTE — TELEPHONE ENCOUNTER
Name: Marybeth Ramos  : 1970  MRN: [id number]    Oncology Navigation Follow-Up Note    Contact Type:  Telephone  Notes: Pt left VM inquiring on chemotherapy. Spoke with Basilia Woodward, [facility] nurse navigator, updated on VM. Basilia Woodward stated chemotherapy order switched back to [facility] & pt may contact Basilia Woodward @ [phone]. Spoke with pt, updated on conversation with Basilia Woodward. Pt verbalized understanding.     Electronically signed by Musa Garcia RN on 2021 at 9:26 AM

## 2021-04-20 ENCOUNTER — SOCIAL WORK (OUTPATIENT)
Dept: ONCOLOGY | Age: 51
End: 2021-04-20

## 2021-04-20 NOTE — PROGRESS NOTES
SW received a call from patient who had questions about SSA benefits. Patient will be receiving cancer treatment in [place] now. Patient wanting to apply for SSI/SSDI for depression, but he is not currently receiving treatment or taking medication for this diagnosis. SW suggested he apply based on his cancer diagnosis. Application process explained and patient voices understanding. He also asks about admission to a skilled nursing facility \"while I am getting treatment\". MARVEL explained to Jenny Richardson that he would need a skilled need to qualify him and his [facility] Medicaid would need to approve payment. He was told he can call Admissions at a skilled nursing facility in his area to see if he would meet criteria, which he states he will do. SW also told him that if he is weak or doing poorly in the home, he could be seen in an ER for a SNF placement as well. All questions answered and patient will call with any further questions. Sheri Anthony.  Read Dakin

## 2021-04-22 ENCOUNTER — OFFICE VISIT (OUTPATIENT)
Dept: GASTROENTEROLOGY | Age: 51
End: 2021-04-22
Payer: COMMERCIAL

## 2021-04-22 VITALS
RESPIRATION RATE: 20 BRPM | SYSTOLIC BLOOD PRESSURE: 126 MMHG | HEIGHT: 72 IN | BODY MASS INDEX: 26.95 KG/M2 | TEMPERATURE: 97.5 F | WEIGHT: 199 LBS | DIASTOLIC BLOOD PRESSURE: 89 MMHG | HEART RATE: 96 BPM

## 2021-04-22 DIAGNOSIS — C32.9 LARYNGEAL CANCER (HCC): ICD-10-CM

## 2021-04-22 DIAGNOSIS — F10.10 ALCOHOL ABUSE: ICD-10-CM

## 2021-04-22 DIAGNOSIS — K27.9 PEPTIC ULCER DISEASE: Primary | ICD-10-CM

## 2021-04-22 PROCEDURE — G8427 DOCREV CUR MEDS BY ELIG CLIN: HCPCS | Performed by: INTERNAL MEDICINE

## 2021-04-22 PROCEDURE — 3017F COLORECTAL CA SCREEN DOC REV: CPT | Performed by: INTERNAL MEDICINE

## 2021-04-22 PROCEDURE — 99215 OFFICE O/P EST HI 40 MIN: CPT | Performed by: INTERNAL MEDICINE

## 2021-04-22 PROCEDURE — 4004F PT TOBACCO SCREEN RCVD TLK: CPT | Performed by: INTERNAL MEDICINE

## 2021-04-22 PROCEDURE — G8419 CALC BMI OUT NRM PARAM NOF/U: HCPCS | Performed by: INTERNAL MEDICINE

## 2021-04-22 ASSESSMENT — ENCOUNTER SYMPTOMS
COUGH: 0
BLOOD IN STOOL: 1
SHORTNESS OF BREATH: 1
ABDOMINAL DISTENTION: 0
NAUSEA: 1
CHOKING: 0
ABDOMINAL PAIN: 1
TROUBLE SWALLOWING: 1
ANAL BLEEDING: 0
CONSTIPATION: 0
WHEEZING: 0
DIARRHEA: 0
VOMITING: 1
RECTAL PAIN: 0

## 2021-04-22 NOTE — PROGRESS NOTES
 COPD (chronic obstructive pulmonary disease) (HCC)     COPD (chronic obstructive pulmonary disease) (HCC)     Depression     [facility] with loss of [facility] [address] [facility]     Headache     Hypertension     Liver disease     Migraine        Past Surgical History:   Procedure Laterality Date    ANKLE SURGERY  age 10    left    ENDOSCOPY, COLON, DIAGNOSTIC      IR PORT PLACEMENT EQUAL OR GREATER THAN 5 YEARS  4/6/2021    IR PORT PLACEMENT EQUAL OR GREATER THAN 5 YEARS 4/6/2021 [facility] SPECIAL PROCEDURES    UPPER GASTROINTESTINAL ENDOSCOPY N/A 9/17/2019    EGD BIOPSY performed by Karey Campos MD at [address] [facility]  3/16/2021    [address] 3/16/2021 [facility] ULTRASOUND       CURRENT MEDICATIONS:    Current Outpatient Medications:     omeprazole (PRILOSEC) 20 MG delayed release capsule, Take 1 capsule by mouth daily, Disp: 30 capsule, Rfl: 3    sucralfate (CARAFATE) 1 GM/10ML suspension, Take 10 mLs by mouth 4 times daily, Disp: 420 mL, Rfl: 3    ALLERGIES:   No Known Allergies    FAMILY HISTORY:       Problem Relation Age of Onset    High Blood Pressure Mother     Diabetes Mother          SOCIAL HISTORY:   Social History     Socioeconomic History    Marital status: Single     Spouse name: Not on file    Number of children: Not on file    Years of education: Not on file    Highest education level: Not on file   Occupational History    Not on file   Social Needs    Financial resource strain: Not on file    Food insecurity     Worry: Not on file     Inability: Not on file    Transportation needs     Medical: Not on file     Non-medical: Not on file   Tobacco Use    Smoking status: Current Every Day Smoker     Packs/day: 2.00    Smokeless tobacco: Never Used   Substance and Sexual Activity    Alcohol use: Yes     Comment: 12 beers daily- as of 4/22/21 pt states occasional    Drug use: Not Currently     Types: Marijuana    Sexual activity: Not on file   Lifestyle    Physical activity     Days per week: Not on file     Minutes per session: Not on file    Stress: Not on file   Relationships    Social connections     Talks on phone: Not on file     Gets together: Not on file     Attends [employer] service: Not on file     Active member of club or organization: Not on file     Attends meetings of clubs or organizations: Not on file     Relationship status: Not on file    Intimate partner violence     Fear of current or ex partner: Not on file     Emotionally abused: Not on file     Physically abused: Not on file     Forced sexual activity: Not on file   Other Topics Concern    Not on file   Social History Narrative    Not on file       REVIEW OF SYSTEMS: A 12-point review of systemswas obtained and pertinent positives and negatives were enumerated above in the history of present illness. All other reviewed systems / symptoms were negative. Review of Systems   Constitutional: Positive for fatigue and unexpected weight change (decrease). Negative for appetite change. HENT: Positive for trouble swallowing. Eyes: Positive for visual disturbance (glasses). Respiratory: Positive for shortness of breath. Negative for cough, choking and wheezing. Cardiovascular: Positive for chest pain. Negative for palpitations and leg swelling. Gastrointestinal: Positive for abdominal pain, blood in stool, nausea and vomiting. Negative for abdominal distention, anal bleeding, constipation, diarrhea and rectal pain. Genitourinary: Negative for difficulty urinating. Allergic/Immunologic: Negative for environmental allergies and food allergies. Neurological: Positive for headaches. Negative for dizziness, weakness, light-headedness and numbness. Hematological: Bruises/bleeds easily. Psychiatric/Behavioral: Negative for sleep disturbance. The patient is nervous/anxious.             LABORATORY DATA: Reviewed  Lab Results   Component Value Date    WBC 5.9 04/07/2021    HGB 12.4 (L) internal jugular vein. The right side of the patient's neck and upper anterior chest were sterilely prepped and draped. After giving local anesthesia, a 21 gauge needle was used to puncture the patent right internal jugular vein from a low lateral approach under ultrasound guidance, with an image saved. A 0.018 inch wire was then advanced through the needle to the cavoatrial junction, as confirmed fluoroscopically. The needle was exchanged over the wire for a 5 Nigerian coaxial transitional dilator. With the wire tip at the cavoatrial junction, length measurement was made. The wire and inner dilator were then exchanged for a 0.035 inch J-wire which was advanced into the IVC under fluoroscopic guidance. A site on the upper anterior right chest was then anesthetized with lidocaine with epinephrine as was a subcutaneous track connecting this to the venipuncture site. A #15 blade was used to make an approximately 2 cm long incision on the upper anterior right chest. Subcutaneous port pocket was then created using blunt dissection. The port catheter was then tunneled to the venotomy site. The port was then placed in the pocket, and the distal end of the catheter was cut to an appropriate length. The 5 Nigerian dilator in the internal jugular vein was exchanged over the 0.035 inch wire for a 8 Nigerian peel-away sheath. The catheter was then advanced through the peel-away sheath and peel-away sheath was removed. Brief fluoroscopic image was taken to confirm proper placement of the port with no kinking. Additionally, the tip of the catheter was located at the cavoatrial junction. The port was accessed and noted to aspirate and flush well. The pocket was closed with buried subcutaneous 2-0 Vicryl stitches, 4 0 Vicryl subcuticular suture, and skin glue. Venotomy site was also closed with skin glue. The port was again accessed and flushed with 5 cc of 100 unit/ml heparin. Sterile dressings were applied.  The patient appeared to tolerate the procedure well. FINDINGS: Fluoroscopic image demonstrates the tip of the port at the cavo-atrial junction . Successful ultrasound and fluoroscopy guided Port-A-Cath placement     Pet Ct Skull Base To Mid Thigh    Result Date: 4/7/2021  EXAMINATION: WHOLE BODY PET/CT 4/7/2021 TECHNIQUE: Following IV injection of 15.6 mCi of F 18 -FDG, PET  tumor imaging was acquired from the base of the skull to the mid thighs. Computed tomography was used for purposes of attenuation correction and anatomic localization. Fusion imaging was utilized for interpretation. Uptake time 69 mins. Glucose level 90 mg/dl. COMPARISON: CT of the soft tissues of the neck of 15 March 2021 HISTORY: ORDERING SYSTEM PROVIDED HISTORY: Laryngeal cancer ([facility] [address] FINDINGS: HEAD/NECK: Normal physiologic uptake of FDG is noted in the brain parenchyma. Moderate uptake is noted within the submandibular glands. Severe intensity FDG uptake is noted on the left side of the larynx, measuring approximately 3.2 x 2.2 cm, consistent with laryngeal carcinoma. Associated with enlarged left anterior cervical lymph nodes are several areas of moderate FDG uptake, larger of 17 mm extending from the level of the left laryngeal mass distally to the thoracic inlet on the left side. Findings are compatible with metastatic lymph nodes. No abnormal FDG uptake is noted within the right cervical lymph node chain or the axillary region. CHEST: Normal physiologic uptake of FDG is noted within the great vessels and left ventricular myocardium. There is mild FDG uptake associated with a nodule in the right lower lobe abutting the right hemidiaphragm and in two left lower lobe nodules abutting the left hemidiaphragm, consistent with neoplasia. No abnormal radiotracer uptake in the mediastinum is noted. ABDOMEN/PELVIS: Normal physiologic uptake of FDG is noted in the liver, spleen, and urinary collecting systems to include kidneys, ureters and bladder.   There is sounds. No murmur. Pulmonary:      Effort: Pulmonary effort is normal. No respiratory distress. Breath sounds: Normal breath sounds. No wheezing. Abdominal:      General: Bowel sounds are normal. There is no distension. Palpations: Abdomen is soft. There is no mass. Tenderness: There is no abdominal tenderness. There is no guarding or rebound. Musculoskeletal: Normal range of motion. General: No tenderness. Skin:     General: Skin is warm. Coloration: Skin is not pale. Findings: No erythema or rash. Comments: He is not diaphoretic   Neurological:      Mental Status: He is alert and oriented to person, place, and time. Deep Tendon Reflexes: Reflexes are normal and symmetric. Psychiatric:         Behavior: Behavior normal.         Thought Content: Thought content normal.         Judgment: Judgment normal.           IMPRESSION: Mr. Naresh Iglesias is a 44 y.o. male with      Diagnosis Orders   1. Peptic ulcer disease  EGD   2. Laryngeal cancer ([facility] [address]  EGD   3. Alcohol abuse     Will proceed with an EGD follow on the gastric ulcers and also placed PEG tube for him  His CBC liver enzymes PT/INR all normal in this months      Diet/life style/natural hx /complication of the dx were all explained in details   Past medical, past surgical, social history, psychiatric history, medications or allergies, all reviewed and  updated    Thank you for allowing me to participate in the care of Mr. Naresh Iglesias. For any further questions please do not hesitate to contact me. I have reviewed and agree with the ROS entered by the MA/RN. Note is dictated utilizing voice recognition software. Unfortunately this leads to occasional typographical errors. Please contact our office if you have any questions.       Marysolсветлана Randall MD  [facility]  O: #[phone]

## 2021-04-27 RX ORDER — PROCHLORPERAZINE MALEATE 10 MG
10 TABLET ORAL EVERY 6 HOURS PRN
Qty: 60 TABLET | Refills: 2 | Status: SHIPPED | OUTPATIENT
Start: 2021-04-27

## 2021-04-27 RX ORDER — LIDOCAINE AND PRILOCAINE 25; 25 MG/G; MG/G
CREAM TOPICAL
Qty: 1 TUBE | Refills: 3 | Status: SHIPPED | OUTPATIENT
Start: 2021-04-27

## 2021-04-27 RX ORDER — ONDANSETRON 8 MG/1
8 TABLET, ORALLY DISINTEGRATING ORAL EVERY 8 HOURS PRN
Qty: 30 TABLET | Refills: 2 | Status: SHIPPED | OUTPATIENT
Start: 2021-04-27

## 2021-04-28 ENCOUNTER — HOSPITAL ENCOUNTER (OUTPATIENT)
Dept: INFUSION THERAPY | Age: 51
Discharge: HOME | End: 2021-04-28
Payer: COMMERCIAL

## 2021-04-28 ENCOUNTER — TELEPHONE (OUTPATIENT)
Dept: ONCOLOGY | Age: 51
End: 2021-04-28

## 2021-04-28 VITALS
WEIGHT: 198 LBS | HEART RATE: 96 BPM | DIASTOLIC BLOOD PRESSURE: 66 MMHG | HEIGHT: 72 IN | TEMPERATURE: 99.4 F | RESPIRATION RATE: 18 BRPM | SYSTOLIC BLOOD PRESSURE: 117 MMHG | BODY MASS INDEX: 26.82 KG/M2

## 2021-04-28 DIAGNOSIS — Z45.2 ENCOUNTER FOR CARE RELATED TO VASCULAR ACCESS PORT: ICD-10-CM

## 2021-04-28 DIAGNOSIS — C32.9 LARYNGEAL CANCER (HCC): Primary | ICD-10-CM

## 2021-04-28 LAB
ABSOLUTE EOS #: 0.27 K/UL (ref 0–0.44)
ABSOLUTE IMMATURE GRANULOCYTE: 0.03 K/UL (ref 0–0.3)
ABSOLUTE LYMPH #: 0.75 K/UL (ref 1.1–3.7)
ABSOLUTE MONO #: 0.85 K/UL (ref 0.1–1.2)
ALBUMIN SERPL-MCNC: 3.7 G/DL (ref 3.5–5.2)
ALBUMIN/GLOBULIN RATIO: 1.1 (ref 1–2.5)
ALP BLD-CCNC: 133 U/L (ref 40–129)
ALT SERPL-CCNC: 17 U/L (ref 5–41)
ANION GAP SERPL CALCULATED.3IONS-SCNC: 8 MMOL/L (ref 9–17)
AST SERPL-CCNC: 21 U/L
BASOPHILS # BLD: 1 % (ref 0–2)
BASOPHILS ABSOLUTE: 0.07 K/UL (ref 0–0.2)
BILIRUB SERPL-MCNC: 0.23 MG/DL (ref 0.3–1.2)
BUN BLDV-MCNC: 7 MG/DL (ref 6–20)
BUN/CREAT BLD: 11 (ref 9–20)
CALCIUM SERPL-MCNC: 9.6 MG/DL (ref 8.6–10.4)
CHLORIDE BLD-SCNC: 102 MMOL/L (ref 98–107)
CO2: 28 MMOL/L (ref 20–31)
CREAT SERPL-MCNC: 0.64 MG/DL (ref 0.7–1.2)
DIFFERENTIAL TYPE: ABNORMAL
EOSINOPHILS RELATIVE PERCENT: 4 % (ref 1–4)
GFR AFRICAN AMERICAN: >60 ML/MIN
GFR NON-AFRICAN AMERICAN: >60 ML/MIN
GFR SERPL CREATININE-BSD FRML MDRD: ABNORMAL ML/MIN/{1.73_M2}
GFR SERPL CREATININE-BSD FRML MDRD: ABNORMAL ML/MIN/{1.73_M2}
GLUCOSE BLD-MCNC: 119 MG/DL (ref 70–99)
HCT VFR BLD CALC: 33.9 % (ref 40.7–50.3)
HEMOGLOBIN: 11 G/DL (ref 13–17)
IMMATURE GRANULOCYTES: 0 %
LYMPHOCYTES # BLD: 11 % (ref 24–43)
MAGNESIUM: 2 MG/DL (ref 1.6–2.6)
MCH RBC QN AUTO: 32.4 PG (ref 25.2–33.5)
MCHC RBC AUTO-ENTMCNC: 32.4 G/DL (ref 28.4–34.8)
MCV RBC AUTO: 100 FL (ref 82.6–102.9)
MONOCYTES # BLD: 12 % (ref 3–12)
NRBC AUTOMATED: 0 PER 100 WBC
PDW BLD-RTO: 14.5 % (ref 11.8–14.4)
PLATELET # BLD: 190 K/UL (ref 138–453)
PLATELET ESTIMATE: ABNORMAL
PMV BLD AUTO: 10.5 FL (ref 8.1–13.5)
POTASSIUM SERPL-SCNC: 4.2 MMOL/L (ref 3.7–5.3)
RBC # BLD: 3.39 M/UL (ref 4.21–5.77)
RBC # BLD: ABNORMAL 10*6/UL
SEG NEUTROPHILS: 72 % (ref 36–65)
SEGMENTED NEUTROPHILS ABSOLUTE COUNT: 5.19 K/UL (ref 1.5–8.1)
SODIUM BLD-SCNC: 138 MMOL/L (ref 135–144)
TOTAL PROTEIN: 7.2 G/DL (ref 6.4–8.3)
WBC # BLD: 7.2 K/UL (ref 3.5–11.3)
WBC # BLD: ABNORMAL 10*3/UL

## 2021-04-28 PROCEDURE — 96375 TX/PRO/DX INJ NEW DRUG ADDON: CPT

## 2021-04-28 PROCEDURE — 96366 THER/PROPH/DIAG IV INF ADDON: CPT

## 2021-04-28 PROCEDURE — 85025 COMPLETE CBC W/AUTO DIFF WBC: CPT

## 2021-04-28 PROCEDURE — 99214 OFFICE O/P EST MOD 30 MIN: CPT

## 2021-04-28 PROCEDURE — 96413 CHEMO IV INFUSION 1 HR: CPT

## 2021-04-28 PROCEDURE — 83735 ASSAY OF MAGNESIUM: CPT

## 2021-04-28 PROCEDURE — 80053 COMPREHEN METABOLIC PANEL: CPT

## 2021-04-28 PROCEDURE — 6360000002 HC RX W HCPCS: Performed by: INTERNAL MEDICINE

## 2021-04-28 PROCEDURE — 96367 TX/PROPH/DG ADDL SEQ IV INF: CPT

## 2021-04-28 PROCEDURE — 36591 DRAW BLOOD OFF VENOUS DEVICE: CPT

## 2021-04-28 PROCEDURE — 96361 HYDRATE IV INFUSION ADD-ON: CPT

## 2021-04-28 PROCEDURE — 2580000003 HC RX 258: Performed by: INTERNAL MEDICINE

## 2021-04-28 RX ORDER — SODIUM CHLORIDE 9 MG/ML
20 INJECTION, SOLUTION INTRAVENOUS ONCE
Status: CANCELLED | OUTPATIENT
Start: 2021-05-05 | End: 2021-05-05

## 2021-04-28 RX ORDER — EPINEPHRINE 1 MG/ML
0.3 INJECTION, SOLUTION, CONCENTRATE INTRAVENOUS PRN
Status: CANCELLED | OUTPATIENT
Start: 2021-05-12

## 2021-04-28 RX ORDER — SODIUM CHLORIDE 0.9 % (FLUSH) 0.9 %
5 SYRINGE (ML) INJECTION PRN
Status: CANCELLED | OUTPATIENT
Start: 2021-05-05

## 2021-04-28 RX ORDER — SODIUM CHLORIDE 0.9 % (FLUSH) 0.9 %
10 SYRINGE (ML) INJECTION PRN
Status: CANCELLED | OUTPATIENT
Start: 2021-05-05

## 2021-04-28 RX ORDER — HEPARIN SODIUM (PORCINE) LOCK FLUSH IV SOLN 100 UNIT/ML 100 UNIT/ML
500 SOLUTION INTRAVENOUS PRN
Status: CANCELLED | OUTPATIENT
Start: 2021-05-19

## 2021-04-28 RX ORDER — SODIUM CHLORIDE 9 MG/ML
25 INJECTION, SOLUTION INTRAVENOUS PRN
Status: CANCELLED | OUTPATIENT
Start: 2021-05-19

## 2021-04-28 RX ORDER — SODIUM CHLORIDE 0.9 % (FLUSH) 0.9 %
10 SYRINGE (ML) INJECTION PRN
Status: CANCELLED | OUTPATIENT
Start: 2021-05-19

## 2021-04-28 RX ORDER — SODIUM CHLORIDE 0.9 % (FLUSH) 0.9 %
5 SYRINGE (ML) INJECTION PRN
Status: CANCELLED | OUTPATIENT
Start: 2021-05-12

## 2021-04-28 RX ORDER — PALONOSETRON 0.05 MG/ML
0.25 INJECTION, SOLUTION INTRAVENOUS ONCE
Status: CANCELLED | OUTPATIENT
Start: 2021-05-05

## 2021-04-28 RX ORDER — DIPHENHYDRAMINE HYDROCHLORIDE 50 MG/ML
50 INJECTION INTRAMUSCULAR; INTRAVENOUS ONCE
Status: CANCELLED | OUTPATIENT
Start: 2021-05-19 | End: 2021-05-19

## 2021-04-28 RX ORDER — HEPARIN SODIUM (PORCINE) LOCK FLUSH IV SOLN 100 UNIT/ML 100 UNIT/ML
500 SOLUTION INTRAVENOUS PRN
Status: CANCELLED | OUTPATIENT
Start: 2021-05-12

## 2021-04-28 RX ORDER — SODIUM CHLORIDE 0.9 % (FLUSH) 0.9 %
10 SYRINGE (ML) INJECTION PRN
Status: CANCELLED | OUTPATIENT
Start: 2021-05-12

## 2021-04-28 RX ORDER — DIPHENHYDRAMINE HYDROCHLORIDE 50 MG/ML
50 INJECTION INTRAMUSCULAR; INTRAVENOUS ONCE
Status: CANCELLED | OUTPATIENT
Start: 2021-05-12 | End: 2021-05-12

## 2021-04-28 RX ORDER — SODIUM CHLORIDE 9 MG/ML
25 INJECTION, SOLUTION INTRAVENOUS PRN
Status: CANCELLED | OUTPATIENT
Start: 2021-05-05

## 2021-04-28 RX ORDER — SODIUM CHLORIDE 0.9 % (FLUSH) 0.9 %
10 SYRINGE (ML) INJECTION PRN
Status: DISCONTINUED | OUTPATIENT
Start: 2021-04-28 | End: 2021-04-29 | Stop reason: HOSPADM

## 2021-04-28 RX ORDER — EPINEPHRINE 1 MG/ML
0.3 INJECTION, SOLUTION, CONCENTRATE INTRAVENOUS PRN
Status: CANCELLED | OUTPATIENT
Start: 2021-05-19

## 2021-04-28 RX ORDER — HEPARIN SODIUM (PORCINE) LOCK FLUSH IV SOLN 100 UNIT/ML 100 UNIT/ML
500 SOLUTION INTRAVENOUS PRN
Status: DISCONTINUED | OUTPATIENT
Start: 2021-04-28 | End: 2021-04-29 | Stop reason: HOSPADM

## 2021-04-28 RX ORDER — SODIUM CHLORIDE 9 MG/ML
20 INJECTION, SOLUTION INTRAVENOUS ONCE
Status: CANCELLED | OUTPATIENT
Start: 2021-05-12 | End: 2021-05-12

## 2021-04-28 RX ORDER — EPINEPHRINE 1 MG/ML
0.3 INJECTION, SOLUTION, CONCENTRATE INTRAVENOUS PRN
Status: CANCELLED | OUTPATIENT
Start: 2021-05-05

## 2021-04-28 RX ORDER — HEPARIN SODIUM (PORCINE) LOCK FLUSH IV SOLN 100 UNIT/ML 100 UNIT/ML
500 SOLUTION INTRAVENOUS PRN
Status: CANCELLED | OUTPATIENT
Start: 2021-05-05

## 2021-04-28 RX ORDER — SODIUM CHLORIDE 9 MG/ML
INJECTION, SOLUTION INTRAVENOUS CONTINUOUS
Status: CANCELLED | OUTPATIENT
Start: 2021-05-19

## 2021-04-28 RX ORDER — METHYLPREDNISOLONE SODIUM SUCCINATE 125 MG/2ML
125 INJECTION, POWDER, LYOPHILIZED, FOR SOLUTION INTRAMUSCULAR; INTRAVENOUS ONCE
Status: CANCELLED | OUTPATIENT
Start: 2021-05-19 | End: 2021-05-19

## 2021-04-28 RX ORDER — METHYLPREDNISOLONE SODIUM SUCCINATE 125 MG/2ML
125 INJECTION, POWDER, LYOPHILIZED, FOR SOLUTION INTRAMUSCULAR; INTRAVENOUS ONCE
Status: CANCELLED | OUTPATIENT
Start: 2021-05-12 | End: 2021-05-12

## 2021-04-28 RX ORDER — PALONOSETRON 0.05 MG/ML
0.25 INJECTION, SOLUTION INTRAVENOUS ONCE
Status: CANCELLED | OUTPATIENT
Start: 2021-05-12

## 2021-04-28 RX ORDER — SODIUM CHLORIDE 9 MG/ML
20 INJECTION, SOLUTION INTRAVENOUS ONCE
Status: CANCELLED | OUTPATIENT
Start: 2021-05-19 | End: 2021-05-19

## 2021-04-28 RX ORDER — SODIUM CHLORIDE 9 MG/ML
25 INJECTION, SOLUTION INTRAVENOUS PRN
Status: CANCELLED | OUTPATIENT
Start: 2021-05-12

## 2021-04-28 RX ORDER — SODIUM CHLORIDE 9 MG/ML
INJECTION, SOLUTION INTRAVENOUS CONTINUOUS
Status: CANCELLED | OUTPATIENT
Start: 2021-05-12

## 2021-04-28 RX ORDER — SODIUM CHLORIDE 0.9 % (FLUSH) 0.9 %
5 SYRINGE (ML) INJECTION PRN
Status: CANCELLED | OUTPATIENT
Start: 2021-05-19

## 2021-04-28 RX ORDER — SODIUM CHLORIDE 9 MG/ML
20 INJECTION, SOLUTION INTRAVENOUS ONCE
Status: DISCONTINUED | OUTPATIENT
Start: 2021-04-28 | End: 2021-04-29 | Stop reason: HOSPADM

## 2021-04-28 RX ORDER — PALONOSETRON 0.05 MG/ML
0.25 INJECTION, SOLUTION INTRAVENOUS ONCE
Status: CANCELLED | OUTPATIENT
Start: 2021-05-19

## 2021-04-28 RX ORDER — PALONOSETRON 0.05 MG/ML
0.25 INJECTION, SOLUTION INTRAVENOUS ONCE
Status: COMPLETED | OUTPATIENT
Start: 2021-04-28 | End: 2021-04-28

## 2021-04-28 RX ORDER — METHYLPREDNISOLONE SODIUM SUCCINATE 125 MG/2ML
125 INJECTION, POWDER, LYOPHILIZED, FOR SOLUTION INTRAMUSCULAR; INTRAVENOUS ONCE
Status: CANCELLED | OUTPATIENT
Start: 2021-05-05 | End: 2021-05-05

## 2021-04-28 RX ORDER — DIPHENHYDRAMINE HYDROCHLORIDE 50 MG/ML
50 INJECTION INTRAMUSCULAR; INTRAVENOUS ONCE
Status: CANCELLED | OUTPATIENT
Start: 2021-05-05 | End: 2021-05-05

## 2021-04-28 RX ORDER — SODIUM CHLORIDE 9 MG/ML
INJECTION, SOLUTION INTRAVENOUS CONTINUOUS
Status: CANCELLED | OUTPATIENT
Start: 2021-05-05

## 2021-04-28 RX ADMIN — SODIUM CHLORIDE, PRESERVATIVE FREE 10 ML: 5 INJECTION INTRAVENOUS at 14:08

## 2021-04-28 RX ADMIN — POTASSIUM CHLORIDE: 2 INJECTION, SOLUTION, CONCENTRATE INTRAVENOUS at 10:45

## 2021-04-28 RX ADMIN — SODIUM CHLORIDE, PRESERVATIVE FREE 10 ML: 5 INJECTION INTRAVENOUS at 14:07

## 2021-04-28 RX ADMIN — HEPARIN 500 UNITS: 100 SYRINGE at 14:08

## 2021-04-28 RX ADMIN — SODIUM CHLORIDE, PRESERVATIVE FREE 10 ML: 5 INJECTION INTRAVENOUS at 09:17

## 2021-04-28 RX ADMIN — SODIUM CHLORIDE, PRESERVATIVE FREE 10 ML: 5 INJECTION INTRAVENOUS at 09:18

## 2021-04-28 RX ADMIN — FOSAPREPITANT 150 MG: 150 INJECTION, POWDER, LYOPHILIZED, FOR SOLUTION INTRAVENOUS at 10:18

## 2021-04-28 RX ADMIN — DEXAMETHASONE SODIUM PHOSPHATE 12 MG: 4 INJECTION, SOLUTION INTRA-ARTICULAR; INTRALESIONAL; INTRAMUSCULAR; INTRAVENOUS; SOFT TISSUE at 10:03

## 2021-04-28 RX ADMIN — CISPLATIN: 1 INJECTION INTRAVENOUS at 11:49

## 2021-04-28 RX ADMIN — SODIUM CHLORIDE 20 ML/HR: 9 INJECTION, SOLUTION INTRAVENOUS at 09:52

## 2021-04-28 RX ADMIN — POTASSIUM CHLORIDE: 2 INJECTION, SOLUTION, CONCENTRATE INTRAVENOUS at 13:03

## 2021-04-28 RX ADMIN — PALONOSETRON 0.25 MG: 0.05 INJECTION, SOLUTION INTRAVENOUS at 09:53

## 2021-04-28 ASSESSMENT — PAIN DESCRIPTION - ONSET: ONSET: ON-GOING

## 2021-04-28 ASSESSMENT — PAIN DESCRIPTION - PROGRESSION: CLINICAL_PROGRESSION: NOT CHANGED

## 2021-04-28 ASSESSMENT — PAIN SCALES - GENERAL: PAINLEVEL_OUTOF10: 5

## 2021-04-28 ASSESSMENT — PAIN DESCRIPTION - DESCRIPTORS: DESCRIPTORS: SHARP

## 2021-04-28 ASSESSMENT — PAIN DESCRIPTION - PAIN TYPE: TYPE: CHRONIC PAIN

## 2021-04-28 ASSESSMENT — PAIN DESCRIPTION - LOCATION: LOCATION: CHEST

## 2021-04-28 ASSESSMENT — PAIN DESCRIPTION - ORIENTATION: ORIENTATION: MID

## 2021-04-28 ASSESSMENT — PAIN DESCRIPTION - FREQUENCY: FREQUENCY: INTERMITTENT

## 2021-05-05 ENCOUNTER — OFFICE VISIT (OUTPATIENT)
Dept: ONCOLOGY | Age: 51
End: 2021-05-05
Payer: COMMERCIAL

## 2021-05-05 ENCOUNTER — TELEPHONE (OUTPATIENT)
Dept: GASTROENTEROLOGY | Age: 51
End: 2021-05-05

## 2021-05-05 ENCOUNTER — HOSPITAL ENCOUNTER (OUTPATIENT)
Dept: INFUSION THERAPY | Age: 51
Discharge: HOME | End: 2021-05-05
Payer: COMMERCIAL

## 2021-05-05 VITALS — BODY MASS INDEX: 27.09 KG/M2 | WEIGHT: 200 LBS | HEIGHT: 72 IN | HEART RATE: 87 BPM

## 2021-05-05 VITALS
DIASTOLIC BLOOD PRESSURE: 68 MMHG | SYSTOLIC BLOOD PRESSURE: 122 MMHG | WEIGHT: 200 LBS | BODY MASS INDEX: 27.12 KG/M2 | TEMPERATURE: 99 F | RESPIRATION RATE: 18 BRPM

## 2021-05-05 DIAGNOSIS — R22.1 NECK MASS: ICD-10-CM

## 2021-05-05 DIAGNOSIS — C77.0 METASTASIS TO CERVICAL LYMPH NODE (HCC): ICD-10-CM

## 2021-05-05 DIAGNOSIS — C32.9 LARYNGEAL CANCER (HCC): Primary | ICD-10-CM

## 2021-05-05 DIAGNOSIS — Z45.2 ENCOUNTER FOR CARE RELATED TO VASCULAR ACCESS PORT: ICD-10-CM

## 2021-05-05 LAB
ABSOLUTE EOS #: 0.27 K/UL (ref 0–0.44)
ABSOLUTE IMMATURE GRANULOCYTE: 0 K/UL (ref 0–0.3)
ABSOLUTE LYMPH #: 0.41 K/UL (ref 1.1–3.7)
ABSOLUTE MONO #: 0.95 K/UL (ref 0.1–1.2)
ALBUMIN SERPL-MCNC: 3.8 G/DL (ref 3.5–5.2)
ALBUMIN/GLOBULIN RATIO: 1.1 (ref 1–2.5)
ALP BLD-CCNC: 134 U/L (ref 40–129)
ALT SERPL-CCNC: 23 U/L (ref 5–41)
ANION GAP SERPL CALCULATED.3IONS-SCNC: 9 MMOL/L (ref 9–17)
AST SERPL-CCNC: 28 U/L
BASOPHILS # BLD: 0 % (ref 0–2)
BASOPHILS ABSOLUTE: 0 K/UL (ref 0–0.2)
BILIRUB SERPL-MCNC: 0.2 MG/DL (ref 0.3–1.2)
BUN BLDV-MCNC: 10 MG/DL (ref 6–20)
BUN/CREAT BLD: 9 (ref 9–20)
CALCIUM SERPL-MCNC: 9.4 MG/DL (ref 8.6–10.4)
CHLORIDE BLD-SCNC: 101 MMOL/L (ref 98–107)
CO2: 28 MMOL/L (ref 20–31)
CREAT SERPL-MCNC: 1.12 MG/DL (ref 0.7–1.2)
DIFFERENTIAL TYPE: ABNORMAL
EOSINOPHILS RELATIVE PERCENT: 4 % (ref 1–4)
GFR AFRICAN AMERICAN: >60 ML/MIN
GFR NON-AFRICAN AMERICAN: >60 ML/MIN
GFR SERPL CREATININE-BSD FRML MDRD: ABNORMAL ML/MIN/{1.73_M2}
GFR SERPL CREATININE-BSD FRML MDRD: ABNORMAL ML/MIN/{1.73_M2}
GLUCOSE BLD-MCNC: 81 MG/DL (ref 70–99)
HCT VFR BLD CALC: 33.4 % (ref 40.7–50.3)
HEMOGLOBIN: 10.9 G/DL (ref 13–17)
IMMATURE GRANULOCYTES: 0 %
LYMPHOCYTES # BLD: 6 % (ref 24–43)
MAGNESIUM: 2 MG/DL (ref 1.6–2.6)
MCH RBC QN AUTO: 32 PG (ref 25.2–33.5)
MCHC RBC AUTO-ENTMCNC: 32.6 G/DL (ref 28.4–34.8)
MCV RBC AUTO: 97.9 FL (ref 82.6–102.9)
MONOCYTES # BLD: 14 % (ref 3–12)
MORPHOLOGY: NORMAL
NRBC AUTOMATED: 0 PER 100 WBC
PDW BLD-RTO: 14.9 % (ref 11.8–14.4)
PLATELET # BLD: 191 K/UL (ref 138–453)
PLATELET ESTIMATE: ABNORMAL
PMV BLD AUTO: 10.6 FL (ref 8.1–13.5)
POTASSIUM SERPL-SCNC: 4.4 MMOL/L (ref 3.7–5.3)
RBC # BLD: 3.41 M/UL (ref 4.21–5.77)
RBC # BLD: ABNORMAL 10*6/UL
SEG NEUTROPHILS: 76 % (ref 36–65)
SEGMENTED NEUTROPHILS ABSOLUTE COUNT: 5.17 K/UL (ref 1.5–8.1)
SODIUM BLD-SCNC: 138 MMOL/L (ref 135–144)
TOTAL PROTEIN: 7.2 G/DL (ref 6.4–8.3)
WBC # BLD: 6.8 K/UL (ref 3.5–11.3)
WBC # BLD: ABNORMAL 10*3/UL

## 2021-05-05 PROCEDURE — 6360000002 HC RX W HCPCS: Performed by: INTERNAL MEDICINE

## 2021-05-05 PROCEDURE — 3017F COLORECTAL CA SCREEN DOC REV: CPT | Performed by: INTERNAL MEDICINE

## 2021-05-05 PROCEDURE — 4004F PT TOBACCO SCREEN RCVD TLK: CPT | Performed by: INTERNAL MEDICINE

## 2021-05-05 PROCEDURE — 96361 HYDRATE IV INFUSION ADD-ON: CPT

## 2021-05-05 PROCEDURE — 85025 COMPLETE CBC W/AUTO DIFF WBC: CPT

## 2021-05-05 PROCEDURE — 83735 ASSAY OF MAGNESIUM: CPT

## 2021-05-05 PROCEDURE — G8419 CALC BMI OUT NRM PARAM NOF/U: HCPCS | Performed by: INTERNAL MEDICINE

## 2021-05-05 PROCEDURE — G8427 DOCREV CUR MEDS BY ELIG CLIN: HCPCS | Performed by: INTERNAL MEDICINE

## 2021-05-05 PROCEDURE — 99214 OFFICE O/P EST MOD 30 MIN: CPT | Performed by: INTERNAL MEDICINE

## 2021-05-05 PROCEDURE — 2580000003 HC RX 258: Performed by: INTERNAL MEDICINE

## 2021-05-05 PROCEDURE — 96413 CHEMO IV INFUSION 1 HR: CPT

## 2021-05-05 PROCEDURE — 96366 THER/PROPH/DIAG IV INF ADDON: CPT

## 2021-05-05 PROCEDURE — 96367 TX/PROPH/DG ADDL SEQ IV INF: CPT

## 2021-05-05 PROCEDURE — 80053 COMPREHEN METABOLIC PANEL: CPT

## 2021-05-05 PROCEDURE — 96375 TX/PRO/DX INJ NEW DRUG ADDON: CPT

## 2021-05-05 PROCEDURE — 36591 DRAW BLOOD OFF VENOUS DEVICE: CPT

## 2021-05-05 RX ORDER — HEPARIN SODIUM (PORCINE) LOCK FLUSH IV SOLN 100 UNIT/ML 100 UNIT/ML
500 SOLUTION INTRAVENOUS PRN
Status: DISCONTINUED | OUTPATIENT
Start: 2021-05-05 | End: 2021-05-06 | Stop reason: HOSPADM

## 2021-05-05 RX ORDER — SODIUM CHLORIDE 9 MG/ML
20 INJECTION, SOLUTION INTRAVENOUS ONCE
Status: DISCONTINUED | OUTPATIENT
Start: 2021-05-05 | End: 2021-05-06 | Stop reason: HOSPADM

## 2021-05-05 RX ORDER — PALONOSETRON 0.05 MG/ML
0.25 INJECTION, SOLUTION INTRAVENOUS ONCE
Status: COMPLETED | OUTPATIENT
Start: 2021-05-05 | End: 2021-05-05

## 2021-05-05 RX ORDER — SODIUM CHLORIDE 0.9 % (FLUSH) 0.9 %
10 SYRINGE (ML) INJECTION PRN
Status: DISCONTINUED | OUTPATIENT
Start: 2021-05-05 | End: 2021-05-06 | Stop reason: HOSPADM

## 2021-05-05 RX ADMIN — HEPARIN 500 UNITS: 100 SYRINGE at 14:14

## 2021-05-05 RX ADMIN — DEXAMETHASONE SODIUM PHOSPHATE 12 MG: 4 INJECTION, SOLUTION INTRA-ARTICULAR; INTRALESIONAL; INTRAMUSCULAR; INTRAVENOUS; SOFT TISSUE at 09:58

## 2021-05-05 RX ADMIN — POTASSIUM CHLORIDE: 2 INJECTION, SOLUTION, CONCENTRATE INTRAVENOUS at 10:40

## 2021-05-05 RX ADMIN — SODIUM CHLORIDE, PRESERVATIVE FREE 10 ML: 5 INJECTION INTRAVENOUS at 14:13

## 2021-05-05 RX ADMIN — SODIUM CHLORIDE, PRESERVATIVE FREE 10 ML: 5 INJECTION INTRAVENOUS at 09:33

## 2021-05-05 RX ADMIN — PALONOSETRON 0.25 MG: 0.05 INJECTION, SOLUTION INTRAVENOUS at 09:57

## 2021-05-05 RX ADMIN — FOSAPREPITANT 150 MG: 150 INJECTION, POWDER, LYOPHILIZED, FOR SOLUTION INTRAVENOUS at 10:11

## 2021-05-05 RX ADMIN — SODIUM CHLORIDE, PRESERVATIVE FREE 10 ML: 5 INJECTION INTRAVENOUS at 14:14

## 2021-05-05 RX ADMIN — SODIUM CHLORIDE 20 ML/HR: 9 INJECTION, SOLUTION INTRAVENOUS at 09:53

## 2021-05-05 RX ADMIN — CISPLATIN: 1 INJECTION INTRAVENOUS at 11:44

## 2021-05-05 RX ADMIN — POTASSIUM CHLORIDE: 2 INJECTION, SOLUTION, CONCENTRATE INTRAVENOUS at 13:12

## 2021-05-05 ASSESSMENT — PAIN DESCRIPTION - PROGRESSION: CLINICAL_PROGRESSION: NOT CHANGED

## 2021-05-05 ASSESSMENT — PAIN DESCRIPTION - LOCATION: LOCATION: CHEST

## 2021-05-05 ASSESSMENT — PAIN DESCRIPTION - ORIENTATION: ORIENTATION: MID

## 2021-05-05 ASSESSMENT — PAIN DESCRIPTION - DESCRIPTORS: DESCRIPTORS: SHARP

## 2021-05-05 ASSESSMENT — PAIN DESCRIPTION - PAIN TYPE: TYPE: CHRONIC PAIN

## 2021-05-05 ASSESSMENT — PAIN DESCRIPTION - ONSET: ONSET: ON-GOING

## 2021-05-05 ASSESSMENT — PAIN SCALES - GENERAL: PAINLEVEL_OUTOF10: 5

## 2021-05-05 ASSESSMENT — PAIN DESCRIPTION - FREQUENCY: FREQUENCY: INTERMITTENT

## 2021-05-05 NOTE — PROGRESS NOTES
Chief Complaint   Patient presents with    Follow-up     discuss treatment, toxicity evaluation     DIAGNOSIS:       1.   Laryngeal squamous cell carcinoma, locally advanced with LN metastases, [id number]   2. Tobacco avuse  3. ETOH abuse  CURRENT THERAPY:         Plan for combined chemoradiation after completing staging work-up  Chemoradiation was started on 4/28/2021  BRIEF CASE HISTORY:      Diana Maria  is a 44 y.o.  male with history of alcohol abuse, depression, COPD, who is admitted to the hospital for SOB. 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 Patient seen by ENT    he had neck LN biopsy and results  Was positive for cancer involvement   The decision was to proceed with definitive chemoradiation. Considering his multiple comorbidities and poor nutritional status, the decision was to offer him weekly cisplatin with radiation. Chemoradiation was started on 4/28/2021  INTERIM HISTORY:   The patient comes in today for a follow-up. He received chemoradiation starting last Wednesday. Today is day 8 of chemoradiation. He handled the first dose very well. He had no nausea or vomiting, no fever or chills. He unfortunately continues to smoke and smokes about 1 pack/day. We talked about smoking cessation and he will try. I offered him the patches but he is not interested at this point. He has increased phlegm. He is able to swallow. He has some sore throat but it is mild. Past Medical History:   has a past medical history of Alcohol abuse, Anxiety, Cancer ([address] COPD (chronic obstructive pulmonary disease) ([address] COPD (chronic obstructive pulmonary disease) ([address] Depression, Head injury with loss of consciousness ([address] Headache, Hypertension, Liver disease, and Migraine.     Past Surgical History:   has a past surgical history that includes Ankle surgery (age 3/5/20[birth year]) 1 Tube 3     No current facility-administered medications for this visit. Facility-Administered Medications Ordered in Other Visits   Medication Dose Route Frequency Provider Last Rate Last Admin    sodium chloride flush 0.9 % injection 10 mL  10 mL Intravenous PRN Augustina Mack MD        heparin flush 100 UNIT/ML injection 500 Units  500 Units Intracatheter PRN Augustina Mack MD           Allergies:  Patient has no known allergies. Social History:   reports that he has been smoking. He has been smoking about 2.00 packs per day. He has never used smokeless tobacco. He reports current alcohol use. He reports previous drug use. Drug: Marijuana. Family History: family history includes Diabetes in his mother; High Blood Pressure in his mother. REVIEW OF SYSTEMS:    Constitutional: No fever or chills. No night sweats, + weight loss   Eyes: No eye discharge, double vision, or eye pain   HEENT: negative for sore mouth, he has sore  throat and mild dysphagia. Increased phlegm  Respiratory: negative for cough , sputum, dyspnea, wheezing, hemoptysis, chest pain   Cardiovascular: negative for chest pain, dyspnea, palpitations, orthopnea, PND   Gastrointestinal: negative for nausea, vomiting, diarrhea, constipation, abdominal pain, Dysphagia, hematemesis and hematochezia   Genitourinary: negative for frequency, dysuria, nocturia, urinary incontinence, and hematuria   Integument: negative for rash, skin lesions, bruises.    Hematologic/Lymphatic: negative for easy bruising, bleeding, lymphadenopathy, or petechiae   Endocrine: negative for heat or cold intolerance,weight changes, change in bowel habits and hair loss   Musculoskeletal: negative for myalgias, arthralgias, pain, joint swelling,and bone pain   Neurological: negative for headaches, dizziness, seizures, weakness, numbness    PHYSICAL EXAM:      /68 (Site: Left Upper Arm, Position: Sitting, Cuff Size: Medium Adult)   Temp 99 °F (37.2 °C) (Temporal)   Resp 18   Wt 200 lb (90.7 kg)   BMI 27.12 kg/m²    Temp (24hrs), Av.7 °F (37.1 °C), Min:98.5 °F (36.9 °C), Max:98.8 °F (37.1 °C)    General appearance -chronically ill appearing, no in pain or distress   Mental status - alert and cooperative   Eyes - pupils equal and reactive, extraocular eye movements intact   Ears - bilateral TM's and external ear canals normal   Mouth - mucous membranes moist, pharynx normal without lesions   Neck - supple, no significant adenopathy   Lymphatics - ++neck palpable lymphadenopathy, no hepatosplenomegaly   Chest - clear to auscultation, no wheezes, rales or rhonchi, symmetric air entry   Heart - normal rate, regular rhythm, normal S1, S2, no murmurs  Abdomen - soft, nontender, nondistended, no masses or organomegaly   Neurological - alert, oriented, normal speech, no focal findings or movement disorder noted   Musculoskeletal - no joint tenderness, deformity or swelling   Extremities - peripheral pulses normal, no pedal edema, no clubbing or cyanosis   Skin - normal coloration and turgor, no rashes, no suspicious skin lesions noted ,    DATA:    Labs:   CBC:   Lab Results   Component Value Date    WBC 6.8 2021    HGB 10.9 (L) 2021    HCT 33.4 (L) 2021    MCV 97.9 2021     2021       Lab Results   Component Value Date     2021    K 4.2 2021     2021    CO2 28 2021    BUN 7 2021    CREATININE 0.64 (L) 2021    GLUCOSE 119 (H) 2021    CALCIUM 9.6 2021    PROT 7.2 2021    LABALBU 3.7 2021    BILITOT 0.23 (L) 2021    ALKPHOS 133 (H) 2021    AST 21 2021    ALT 17 2021    LABGLOM >60 2021    GFRAA >60 2021    GLOB NOT REPORTED 2021         IMAGING DATA:     Impression   1.  Area of significant FDG uptake along the left side of the larynx   measuring 3.2 x 2.2 cm consistent with known laryngeal carcinoma.  Moderate   uptake within both submandibular glands.       2.  Multiple lymph nodes with abnormal FDG uptake in the left anterior   triangle of the neck extending from the larynx distally to the thoracic inlet   consistent with neoplasia.       3.  Thickened gastric mucosa in the fundus of the stomach with mild FDG   uptake likely related to inflammation. IMPRESSION:   1. Laryngeal squamous cell carcinoma, locally advanced with LN metastases, [id number].   2. Tobacco avuse  3. ETOH abuse  4. Undergoing chemoradiation    RECOMMENDATIONS:  1. I reviewed the laboratory data, imaging studies, biopsy results, diagnosis and treatment recommendations. 2. The patient handled the first week of chemoradiation fairly well  3. Labs were reviewed, so far he is tolerating treatment fairly well. No significant side effect  4. We talked about smoking cessation. He will try, refused the Chantix or the patches  5. Overall, I am encouraged by his excellent tolerance to treatment. We will continue on without any changes. 6. We talked about the importance of his nutrition. We will watch his weight closely. We will continue weekly hydration and supportive care. JOSELINE GORDON [facility] MD Mini  Hematologist/Medical [id number] [facility] hematology oncology physicians                           This note is created with the assistance of a speech recognition program.  While intending to generate a document that actually reflects the content of the visit, the document can still have some errors including those of syntax and sound a like substitutions which may escape proof reading. It such instances, actual meaning can be extrapolated by contextual diversion.

## 2021-05-05 NOTE — TELEPHONE ENCOUNTER
Pt called & left msg on ofv vm 5/5/21 @ 3:37pm stating he has questions regarding his appt dates & times.

## 2021-05-05 NOTE — TELEPHONE ENCOUNTER
Writer returned pt's phone call in regards to which appt date he needed clarification for. He wanted to know where his covid test is sched at on 5/15/21. Writer informed pt he is sched at [facility] for his covid testing. Writer instructed pt where to report. Vipulr asked pt if he completed his PAT phone call appt today 5/5/21 @ 8:30am and he states he did not. He states he was having radiation treatment and might have missed the phone call. [facility]ipul[facility] attempted [place] [facility] to resched PAT phone call appt, but there was no answer. Writer informed pt we will get appt rescheduled and we will call him back w/ appt date & time. Pt voices his understanding.

## 2021-05-05 NOTE — PLAN OF CARE
Problem: KNOWLEDGE DEFICIT  Goal: Patient/S.O. demonstrates understanding of disease process, treatment plan, medications, and discharge instructions.   5/5/2021 0925 by Shan Kan RN  Outcome: Met This Shift  5/5/2021 0923 by Shan Kan RN  Outcome: Met This Shift     Problem: Infection - Central Venous Catheter-Associated Bloodstream Infection:  Goal: Will show no infection signs and symptoms  Description: Will show no infection signs and symptoms  5/5/2021 0925 by Shan Kan RN  Outcome: Met This Shift  5/5/2021 0923 by Shan Kan RN  Outcome: Met This Shift     Problem: Pain:  Goal: Pain level will decrease  Description: Pain level will decrease  Outcome: Met This Shift  Goal: Control of acute pain  Description: Control of acute pain  Outcome: Met This Shift  Goal: Control of chronic pain  Description: Control of chronic pain  Outcome: Met This Shift

## 2021-05-06 NOTE — TELEPHONE ENCOUNTER
Rec'd notification from [facility] that pt missed PAT call on 5/5/21. Writer called pt and LVM informing him to call the office so that it can be r/s'd.

## 2021-05-06 NOTE — TELEPHONE ENCOUNTER
Resched PAT call appt to 5/10/21 @ 8:15am.  Writer left msg on pt's vm he is resched for PAT phone call appt on 5/10/21 @ 8:15am. Writer left msg if this date & time does not work for pt's sched to please call back and we will reschedule.

## 2021-05-10 ENCOUNTER — HOSPITAL ENCOUNTER (OUTPATIENT)
Dept: PREADMISSION TESTING | Age: 51
Discharge: HOME | End: 2021-05-14
Payer: COMMERCIAL

## 2021-05-10 VITALS — HEIGHT: 72 IN | BODY MASS INDEX: 26.95 KG/M2 | WEIGHT: 199 LBS

## 2021-05-10 NOTE — PROGRESS NOTES
Pre-op Instructions For Out-Patient Endoscopy Surgery    Medication Instructions:  · Please stop herbs and any supplements now (includes vitamins and minerals). · Please contact your surgeon and prescribing physician for pre-op instructions for any blood thinners. · If you have inhalers/aerosol treatments at home, please use them the morning of your surgery and bring the inhalers with you to the hospital.    · Please take the following medications the morning of your surgery with a sip of water:    None    Surgery Instructions:  1. After midnight before surgery:  Do not eat or drink anything, including water, mints, gum, and hard candy. You may brush your teeth without swallowing. No smoking, chewing tobacco, or street drugs. 2. Please shower or bathe before surgery. 3. Please do not wear any cologne, lotion, powder, jewelry, piercings, perfume, makeup, nail polish, hair accessories, or hair spray on the day of surgery. Wear loose comfortable clothing. 4. Leave your valuables at home. Bring a storage case for any glasses/contacts. 5. An adult who is responsible for you MUST drive you home and should be with you for the first 24 hours after surgery. The Day of Surgery:  · Arrive at [address] [place] [address] Surgery Entrance at the time directed by your surgeon and check in at the desk. · If you have a living will or healthcare power of , please bring a copy. · You will be taken to the pre-op holding area where you will be prepared for surgery. A physical assessment will be performed by a nurse practitioner or house officer. Your IV will be started and you will meet your anesthesiologist.    · We are currently limiting visitors to only one designated person in the pre-op holding area. When you go to surgery, your family will be directed to the surgical waiting room, where the doctor should speak with them after your surgery.     · After surgery, you will be taken to the

## 2021-05-12 ENCOUNTER — HOSPITAL ENCOUNTER (OUTPATIENT)
Dept: INFUSION THERAPY | Age: 51
Discharge: HOME | End: 2021-05-12
Payer: COMMERCIAL

## 2021-05-12 VITALS
HEART RATE: 88 BPM | SYSTOLIC BLOOD PRESSURE: 121 MMHG | RESPIRATION RATE: 16 BRPM | WEIGHT: 197 LBS | BODY MASS INDEX: 26.72 KG/M2 | TEMPERATURE: 99 F | DIASTOLIC BLOOD PRESSURE: 82 MMHG

## 2021-05-12 DIAGNOSIS — Z45.2 ENCOUNTER FOR CARE RELATED TO VASCULAR ACCESS PORT: ICD-10-CM

## 2021-05-12 DIAGNOSIS — C32.9 LARYNGEAL CANCER (HCC): Primary | ICD-10-CM

## 2021-05-12 LAB
ABSOLUTE EOS #: 0.23 K/UL (ref 0–0.44)
ABSOLUTE IMMATURE GRANULOCYTE: 0 K/UL (ref 0–0.3)
ABSOLUTE LYMPH #: 0.4 K/UL (ref 1.1–3.7)
ABSOLUTE MONO #: 0.51 K/UL (ref 0.1–1.2)
ALBUMIN SERPL-MCNC: 4.1 G/DL (ref 3.5–5.2)
ALBUMIN/GLOBULIN RATIO: 1.2 (ref 1–2.5)
ALP BLD-CCNC: 126 U/L (ref 40–129)
ALT SERPL-CCNC: 22 U/L (ref 5–41)
ANION GAP SERPL CALCULATED.3IONS-SCNC: 10 MMOL/L (ref 9–17)
AST SERPL-CCNC: 28 U/L
BASOPHILS # BLD: 0 % (ref 0–2)
BASOPHILS ABSOLUTE: 0 K/UL (ref 0–0.2)
BILIRUB SERPL-MCNC: 0.36 MG/DL (ref 0.3–1.2)
BUN BLDV-MCNC: 9 MG/DL (ref 6–20)
BUN/CREAT BLD: 15 (ref 9–20)
CALCIUM SERPL-MCNC: 9.7 MG/DL (ref 8.6–10.4)
CHLORIDE BLD-SCNC: 97 MMOL/L (ref 98–107)
CO2: 26 MMOL/L (ref 20–31)
CREAT SERPL-MCNC: 0.61 MG/DL (ref 0.7–1.2)
DIFFERENTIAL TYPE: ABNORMAL
EOSINOPHILS RELATIVE PERCENT: 4 % (ref 1–4)
GFR AFRICAN AMERICAN: >60 ML/MIN
GFR NON-AFRICAN AMERICAN: >60 ML/MIN
GFR SERPL CREATININE-BSD FRML MDRD: ABNORMAL ML/MIN/{1.73_M2}
GFR SERPL CREATININE-BSD FRML MDRD: ABNORMAL ML/MIN/{1.73_M2}
GLUCOSE BLD-MCNC: 88 MG/DL (ref 70–99)
HCT VFR BLD CALC: 35.3 % (ref 40.7–50.3)
HEMOGLOBIN: 11.5 G/DL (ref 13–17)
IMMATURE GRANULOCYTES: 0 %
LYMPHOCYTES # BLD: 7 % (ref 24–43)
MAGNESIUM: 2 MG/DL (ref 1.6–2.6)
MCH RBC QN AUTO: 32 PG (ref 25.2–33.5)
MCHC RBC AUTO-ENTMCNC: 32.6 G/DL (ref 28.4–34.8)
MCV RBC AUTO: 98.3 FL (ref 82.6–102.9)
MONOCYTES # BLD: 9 % (ref 3–12)
MORPHOLOGY: NORMAL
NRBC AUTOMATED: 0 PER 100 WBC
PDW BLD-RTO: 15.4 % (ref 11.8–14.4)
PLATELET # BLD: 169 K/UL (ref 138–453)
PLATELET ESTIMATE: ABNORMAL
PMV BLD AUTO: 10.8 FL (ref 8.1–13.5)
POTASSIUM SERPL-SCNC: 4.2 MMOL/L (ref 3.7–5.3)
RBC # BLD: 3.59 M/UL (ref 4.21–5.77)
RBC # BLD: ABNORMAL 10*6/UL
SEG NEUTROPHILS: 80 % (ref 36–65)
SEGMENTED NEUTROPHILS ABSOLUTE COUNT: 4.56 K/UL (ref 1.5–8.1)
SODIUM BLD-SCNC: 133 MMOL/L (ref 135–144)
TOTAL PROTEIN: 7.5 G/DL (ref 6.4–8.3)
WBC # BLD: 5.7 K/UL (ref 3.5–11.3)
WBC # BLD: ABNORMAL 10*3/UL

## 2021-05-12 PROCEDURE — 6360000002 HC RX W HCPCS: Performed by: INTERNAL MEDICINE

## 2021-05-12 PROCEDURE — 2580000003 HC RX 258: Performed by: INTERNAL MEDICINE

## 2021-05-12 PROCEDURE — 36591 DRAW BLOOD OFF VENOUS DEVICE: CPT

## 2021-05-12 PROCEDURE — 85025 COMPLETE CBC W/AUTO DIFF WBC: CPT

## 2021-05-12 PROCEDURE — 80053 COMPREHEN METABOLIC PANEL: CPT

## 2021-05-12 PROCEDURE — 96375 TX/PRO/DX INJ NEW DRUG ADDON: CPT

## 2021-05-12 PROCEDURE — 96361 HYDRATE IV INFUSION ADD-ON: CPT

## 2021-05-12 PROCEDURE — 96367 TX/PROPH/DG ADDL SEQ IV INF: CPT

## 2021-05-12 PROCEDURE — 96413 CHEMO IV INFUSION 1 HR: CPT

## 2021-05-12 PROCEDURE — 83735 ASSAY OF MAGNESIUM: CPT

## 2021-05-12 RX ORDER — SODIUM CHLORIDE 0.9 % (FLUSH) 0.9 %
5-40 SYRINGE (ML) INJECTION PRN
Status: DISCONTINUED | OUTPATIENT
Start: 2021-05-12 | End: 2021-05-13 | Stop reason: HOSPADM

## 2021-05-12 RX ORDER — HEPARIN SODIUM (PORCINE) LOCK FLUSH IV SOLN 100 UNIT/ML 100 UNIT/ML
500 SOLUTION INTRAVENOUS PRN
Status: CANCELLED | OUTPATIENT
Start: 2021-05-12

## 2021-05-12 RX ORDER — SODIUM CHLORIDE 0.9 % (FLUSH) 0.9 %
5-40 SYRINGE (ML) INJECTION PRN
Status: CANCELLED | OUTPATIENT
Start: 2021-05-12

## 2021-05-12 RX ORDER — HEPARIN SODIUM (PORCINE) LOCK FLUSH IV SOLN 100 UNIT/ML 100 UNIT/ML
500 SOLUTION INTRAVENOUS PRN
Status: DISCONTINUED | OUTPATIENT
Start: 2021-05-12 | End: 2021-05-13 | Stop reason: HOSPADM

## 2021-05-12 RX ORDER — PALONOSETRON 0.05 MG/ML
0.25 INJECTION, SOLUTION INTRAVENOUS ONCE
Status: COMPLETED | OUTPATIENT
Start: 2021-05-12 | End: 2021-05-12

## 2021-05-12 RX ORDER — SODIUM CHLORIDE 9 MG/ML
20 INJECTION, SOLUTION INTRAVENOUS ONCE
Status: COMPLETED | OUTPATIENT
Start: 2021-05-12 | End: 2021-05-12

## 2021-05-12 RX ORDER — SODIUM CHLORIDE 9 MG/ML
25 INJECTION, SOLUTION INTRAVENOUS PRN
Status: CANCELLED | OUTPATIENT
Start: 2021-05-12

## 2021-05-12 RX ADMIN — DEXAMETHASONE SODIUM PHOSPHATE 12 MG: 4 INJECTION, SOLUTION INTRAMUSCULAR; INTRAVENOUS at 10:37

## 2021-05-12 RX ADMIN — SODIUM CHLORIDE, PRESERVATIVE FREE 10 ML: 5 INJECTION INTRAVENOUS at 09:03

## 2021-05-12 RX ADMIN — POTASSIUM CHLORIDE: 2 INJECTION, SOLUTION, CONCENTRATE INTRAVENOUS at 13:52

## 2021-05-12 RX ADMIN — SODIUM CHLORIDE, PRESERVATIVE FREE 10 ML: 5 INJECTION INTRAVENOUS at 09:04

## 2021-05-12 RX ADMIN — SODIUM CHLORIDE, PRESERVATIVE FREE 10 ML: 5 INJECTION INTRAVENOUS at 10:30

## 2021-05-12 RX ADMIN — FOSAPREPITANT 150 MG: 150 INJECTION, POWDER, LYOPHILIZED, FOR SOLUTION INTRAVENOUS at 10:56

## 2021-05-12 RX ADMIN — CISPLATIN: 1 INJECTION INTRAVENOUS at 12:45

## 2021-05-12 RX ADMIN — PALONOSETRON 0.25 MG: 0.05 INJECTION, SOLUTION INTRAVENOUS at 10:36

## 2021-05-12 RX ADMIN — POTASSIUM CHLORIDE: 2 INJECTION, SOLUTION, CONCENTRATE INTRAVENOUS at 11:34

## 2021-05-12 RX ADMIN — SODIUM CHLORIDE 20 ML/HR: 9 INJECTION, SOLUTION INTRAVENOUS at 10:30

## 2021-05-12 RX ADMIN — HEPARIN 500 UNITS: 100 SYRINGE at 15:03

## 2021-05-12 RX ADMIN — SODIUM CHLORIDE, PRESERVATIVE FREE 10 ML: 5 INJECTION INTRAVENOUS at 15:03

## 2021-05-12 ASSESSMENT — PAIN DESCRIPTION - DESCRIPTORS: DESCRIPTORS: CONSTANT

## 2021-05-12 ASSESSMENT — PAIN DESCRIPTION - LOCATION: LOCATION: CHEST

## 2021-05-12 ASSESSMENT — PAIN DESCRIPTION - PAIN TYPE: TYPE: CHRONIC PAIN

## 2021-05-12 ASSESSMENT — PAIN DESCRIPTION - ORIENTATION: ORIENTATION: MID

## 2021-05-12 ASSESSMENT — PAIN DESCRIPTION - FREQUENCY: FREQUENCY: INTERMITTENT

## 2021-05-12 ASSESSMENT — PAIN SCALES - GENERAL: PAINLEVEL_OUTOF10: 3

## 2021-05-12 NOTE — PROGRESS NOTES
Michelle Lehman here for tx, Toxicity assessment completed   Port accessed per policy, good blood return, labs drawn   Labs reviewed, treatment plan released  Pre meds given see MAR   Pre hydration given as ordered see MAR   IV Cisplatin over 60min see MAR   Pt tolerated well without incident   Post hydration given as ordered see MAR   Pt tolerated tx well, no complaints   Discharged to home, pt going to [facility]

## 2021-05-15 ENCOUNTER — HOSPITAL ENCOUNTER (OUTPATIENT)
Dept: LAB | Age: 51
Setting detail: SPECIMEN
Discharge: HOME | End: 2021-05-15
Payer: COMMERCIAL

## 2021-05-15 DIAGNOSIS — Z01.818 PRE-OP TESTING: Primary | ICD-10-CM

## 2021-05-15 PROCEDURE — U0003 INFECTIOUS AGENT DETECTION BY NUCLEIC ACID (DNA OR RNA); SEVERE ACUTE RESPIRATORY SYNDROME CORONAVIRUS 2 (SARS-COV-2) (CORONAVIRUS DISEASE [COVID-19]), AMPLIFIED PROBE TECHNIQUE, MAKING USE OF HIGH THROUGHPUT TECHNOLOGIES AS DESCRIBED BY CMS-2020-01-R: HCPCS

## 2021-05-15 PROCEDURE — U0005 INFEC AGEN DETEC AMPLI PROBE: HCPCS

## 2021-05-16 LAB
SARS-COV-2: NORMAL
SARS-COV-2: NOT DETECTED
SOURCE: NORMAL

## 2021-05-18 ENCOUNTER — ANESTHESIA EVENT (OUTPATIENT)
Dept: ENDOSCOPY | Age: 51
End: 2021-05-18
Payer: COMMERCIAL

## 2021-05-18 ENCOUNTER — HOSPITAL ENCOUNTER (OUTPATIENT)
Dept: INFUSION THERAPY | Age: 51
Discharge: HOME | End: 2021-05-18
Payer: COMMERCIAL

## 2021-05-18 VITALS
DIASTOLIC BLOOD PRESSURE: 73 MMHG | RESPIRATION RATE: 16 BRPM | TEMPERATURE: 98 F | HEART RATE: 80 BPM | BODY MASS INDEX: 26.55 KG/M2 | WEIGHT: 196 LBS | SYSTOLIC BLOOD PRESSURE: 123 MMHG | HEIGHT: 72 IN

## 2021-05-18 DIAGNOSIS — C32.9 LARYNGEAL CANCER (HCC): Primary | ICD-10-CM

## 2021-05-18 DIAGNOSIS — Z45.2 ENCOUNTER FOR CARE RELATED TO VASCULAR ACCESS PORT: ICD-10-CM

## 2021-05-18 LAB
ABSOLUTE EOS #: 0.06 K/UL (ref 0–0.44)
ABSOLUTE IMMATURE GRANULOCYTE: 0 K/UL (ref 0–0.3)
ABSOLUTE LYMPH #: 0.64 K/UL (ref 1.1–3.7)
ABSOLUTE MONO #: 0.41 K/UL (ref 0.1–1.2)
ALBUMIN SERPL-MCNC: 3.9 G/DL (ref 3.5–5.2)
ALBUMIN/GLOBULIN RATIO: 1.2 (ref 1–2.5)
ALP BLD-CCNC: 113 U/L (ref 40–129)
ALT SERPL-CCNC: 17 U/L (ref 5–41)
ANION GAP SERPL CALCULATED.3IONS-SCNC: 10 MMOL/L (ref 9–17)
AST SERPL-CCNC: 17 U/L
BASOPHILS # BLD: 0 % (ref 0–2)
BASOPHILS ABSOLUTE: 0 K/UL (ref 0–0.2)
BILIRUB SERPL-MCNC: 0.31 MG/DL (ref 0.3–1.2)
BUN BLDV-MCNC: 4 MG/DL (ref 6–20)
BUN/CREAT BLD: 8 (ref 9–20)
CALCIUM SERPL-MCNC: 9.5 MG/DL (ref 8.6–10.4)
CHLORIDE BLD-SCNC: 99 MMOL/L (ref 98–107)
CO2: 26 MMOL/L (ref 20–31)
CREAT SERPL-MCNC: 0.52 MG/DL (ref 0.7–1.2)
DIFFERENTIAL TYPE: ABNORMAL
EOSINOPHILS RELATIVE PERCENT: 1 % (ref 1–4)
GFR AFRICAN AMERICAN: >60 ML/MIN
GFR NON-AFRICAN AMERICAN: >60 ML/MIN
GFR SERPL CREATININE-BSD FRML MDRD: ABNORMAL ML/MIN/{1.73_M2}
GFR SERPL CREATININE-BSD FRML MDRD: ABNORMAL ML/MIN/{1.73_M2}
GLUCOSE BLD-MCNC: 131 MG/DL (ref 70–99)
HCT VFR BLD CALC: 35.4 % (ref 40.7–50.3)
HEMOGLOBIN: 11.6 G/DL (ref 13–17)
IMMATURE GRANULOCYTES: 0 %
LYMPHOCYTES # BLD: 11 % (ref 24–43)
MAGNESIUM: 1.9 MG/DL (ref 1.6–2.6)
MCH RBC QN AUTO: 32.4 PG (ref 25.2–33.5)
MCHC RBC AUTO-ENTMCNC: 32.8 G/DL (ref 28.4–34.8)
MCV RBC AUTO: 98.9 FL (ref 82.6–102.9)
MONOCYTES # BLD: 7 % (ref 3–12)
MORPHOLOGY: NORMAL
NRBC AUTOMATED: 0 PER 100 WBC
PDW BLD-RTO: 15.9 % (ref 11.8–14.4)
PLATELET # BLD: ABNORMAL K/UL (ref 138–453)
PLATELET ESTIMATE: ABNORMAL
PLATELET, FLUORESCENCE: 137 K/UL (ref 138–453)
PLATELET, IMMATURE FRACTION: 4 % (ref 1.1–10.3)
PMV BLD AUTO: ABNORMAL FL (ref 8.1–13.5)
POTASSIUM SERPL-SCNC: 3.8 MMOL/L (ref 3.7–5.3)
RBC # BLD: 3.58 M/UL (ref 4.21–5.77)
RBC # BLD: ABNORMAL 10*6/UL
SEG NEUTROPHILS: 81 % (ref 36–65)
SEGMENTED NEUTROPHILS ABSOLUTE COUNT: 4.69 K/UL (ref 1.5–8.1)
SODIUM BLD-SCNC: 135 MMOL/L (ref 135–144)
TOTAL PROTEIN: 7.2 G/DL (ref 6.4–8.3)
WBC # BLD: 5.8 K/UL (ref 3.5–11.3)
WBC # BLD: ABNORMAL 10*3/UL

## 2021-05-18 PROCEDURE — 96375 TX/PRO/DX INJ NEW DRUG ADDON: CPT

## 2021-05-18 PROCEDURE — 96366 THER/PROPH/DIAG IV INF ADDON: CPT

## 2021-05-18 PROCEDURE — 36415 COLL VENOUS BLD VENIPUNCTURE: CPT

## 2021-05-18 PROCEDURE — 96367 TX/PROPH/DG ADDL SEQ IV INF: CPT

## 2021-05-18 PROCEDURE — 85055 RETICULATED PLATELET ASSAY: CPT

## 2021-05-18 PROCEDURE — 80053 COMPREHEN METABOLIC PANEL: CPT

## 2021-05-18 PROCEDURE — 96413 CHEMO IV INFUSION 1 HR: CPT

## 2021-05-18 PROCEDURE — 6360000002 HC RX W HCPCS: Performed by: INTERNAL MEDICINE

## 2021-05-18 PROCEDURE — 36591 DRAW BLOOD OFF VENOUS DEVICE: CPT

## 2021-05-18 PROCEDURE — 96361 HYDRATE IV INFUSION ADD-ON: CPT

## 2021-05-18 PROCEDURE — 85025 COMPLETE CBC W/AUTO DIFF WBC: CPT

## 2021-05-18 PROCEDURE — 83735 ASSAY OF MAGNESIUM: CPT

## 2021-05-18 PROCEDURE — 2580000003 HC RX 258: Performed by: INTERNAL MEDICINE

## 2021-05-18 RX ORDER — SODIUM CHLORIDE 9 MG/ML
20 INJECTION, SOLUTION INTRAVENOUS ONCE
Status: CANCELLED | OUTPATIENT
Start: 2021-06-02 | End: 2021-06-02

## 2021-05-18 RX ORDER — SODIUM CHLORIDE 0.9 % (FLUSH) 0.9 %
10 SYRINGE (ML) INJECTION PRN
Status: CANCELLED | OUTPATIENT
Start: 2021-05-26

## 2021-05-18 RX ORDER — SODIUM CHLORIDE 9 MG/ML
25 INJECTION, SOLUTION INTRAVENOUS PRN
Status: CANCELLED | OUTPATIENT
Start: 2021-06-02

## 2021-05-18 RX ORDER — SODIUM CHLORIDE 0.9 % (FLUSH) 0.9 %
10 SYRINGE (ML) INJECTION PRN
Status: DISCONTINUED | OUTPATIENT
Start: 2021-05-18 | End: 2021-05-19 | Stop reason: HOSPADM

## 2021-05-18 RX ORDER — PALONOSETRON 0.05 MG/ML
0.25 INJECTION, SOLUTION INTRAVENOUS ONCE
Status: CANCELLED | OUTPATIENT
Start: 2021-06-02

## 2021-05-18 RX ORDER — SODIUM CHLORIDE 9 MG/ML
25 INJECTION, SOLUTION INTRAVENOUS PRN
Status: CANCELLED | OUTPATIENT
Start: 2021-05-26

## 2021-05-18 RX ORDER — EPINEPHRINE 1 MG/ML
0.3 INJECTION, SOLUTION, CONCENTRATE INTRAVENOUS PRN
Status: CANCELLED | OUTPATIENT
Start: 2021-05-26

## 2021-05-18 RX ORDER — SODIUM CHLORIDE 9 MG/ML
INJECTION, SOLUTION INTRAVENOUS CONTINUOUS
Status: CANCELLED | OUTPATIENT
Start: 2021-06-02

## 2021-05-18 RX ORDER — SODIUM CHLORIDE 9 MG/ML
20 INJECTION, SOLUTION INTRAVENOUS ONCE
Status: DISCONTINUED | OUTPATIENT
Start: 2021-05-18 | End: 2021-05-19 | Stop reason: HOSPADM

## 2021-05-18 RX ORDER — PALONOSETRON 0.05 MG/ML
0.25 INJECTION, SOLUTION INTRAVENOUS ONCE
Status: CANCELLED | OUTPATIENT
Start: 2021-05-26

## 2021-05-18 RX ORDER — HEPARIN SODIUM (PORCINE) LOCK FLUSH IV SOLN 100 UNIT/ML 100 UNIT/ML
500 SOLUTION INTRAVENOUS PRN
Status: CANCELLED | OUTPATIENT
Start: 2021-05-26

## 2021-05-18 RX ORDER — DIPHENHYDRAMINE HYDROCHLORIDE 50 MG/ML
50 INJECTION INTRAMUSCULAR; INTRAVENOUS ONCE
Status: CANCELLED | OUTPATIENT
Start: 2021-06-02 | End: 2021-06-02

## 2021-05-18 RX ORDER — HEPARIN SODIUM (PORCINE) LOCK FLUSH IV SOLN 100 UNIT/ML 100 UNIT/ML
500 SOLUTION INTRAVENOUS PRN
Status: DISCONTINUED | OUTPATIENT
Start: 2021-05-18 | End: 2021-05-19 | Stop reason: HOSPADM

## 2021-05-18 RX ORDER — SODIUM CHLORIDE 9 MG/ML
20 INJECTION, SOLUTION INTRAVENOUS ONCE
Status: CANCELLED | OUTPATIENT
Start: 2021-05-26 | End: 2021-05-26

## 2021-05-18 RX ORDER — DIPHENHYDRAMINE HYDROCHLORIDE 50 MG/ML
50 INJECTION INTRAMUSCULAR; INTRAVENOUS ONCE
Status: CANCELLED | OUTPATIENT
Start: 2021-05-26 | End: 2021-05-26

## 2021-05-18 RX ORDER — HEPARIN SODIUM (PORCINE) LOCK FLUSH IV SOLN 100 UNIT/ML 100 UNIT/ML
500 SOLUTION INTRAVENOUS PRN
Status: CANCELLED | OUTPATIENT
Start: 2021-06-02

## 2021-05-18 RX ORDER — EPINEPHRINE 1 MG/ML
0.3 INJECTION, SOLUTION, CONCENTRATE INTRAVENOUS PRN
Status: CANCELLED | OUTPATIENT
Start: 2021-06-02

## 2021-05-18 RX ORDER — METHYLPREDNISOLONE SODIUM SUCCINATE 125 MG/2ML
125 INJECTION, POWDER, LYOPHILIZED, FOR SOLUTION INTRAMUSCULAR; INTRAVENOUS ONCE
Status: CANCELLED | OUTPATIENT
Start: 2021-05-26 | End: 2021-05-26

## 2021-05-18 RX ORDER — SODIUM CHLORIDE 0.9 % (FLUSH) 0.9 %
10 SYRINGE (ML) INJECTION PRN
Status: CANCELLED | OUTPATIENT
Start: 2021-06-02

## 2021-05-18 RX ORDER — PALONOSETRON 0.05 MG/ML
0.25 INJECTION, SOLUTION INTRAVENOUS ONCE
Status: COMPLETED | OUTPATIENT
Start: 2021-05-18 | End: 2021-05-18

## 2021-05-18 RX ORDER — SODIUM CHLORIDE 9 MG/ML
INJECTION, SOLUTION INTRAVENOUS CONTINUOUS
Status: CANCELLED | OUTPATIENT
Start: 2021-05-26

## 2021-05-18 RX ORDER — SODIUM CHLORIDE 0.9 % (FLUSH) 0.9 %
5 SYRINGE (ML) INJECTION PRN
Status: CANCELLED | OUTPATIENT
Start: 2021-06-02

## 2021-05-18 RX ORDER — SODIUM CHLORIDE 0.9 % (FLUSH) 0.9 %
5 SYRINGE (ML) INJECTION PRN
Status: CANCELLED | OUTPATIENT
Start: 2021-05-26

## 2021-05-18 RX ORDER — METHYLPREDNISOLONE SODIUM SUCCINATE 125 MG/2ML
125 INJECTION, POWDER, LYOPHILIZED, FOR SOLUTION INTRAMUSCULAR; INTRAVENOUS ONCE
Status: CANCELLED | OUTPATIENT
Start: 2021-06-02 | End: 2021-06-02

## 2021-05-18 RX ADMIN — DEXAMETHASONE SODIUM PHOSPHATE 12 MG: 4 INJECTION, SOLUTION INTRAMUSCULAR; INTRAVENOUS at 09:18

## 2021-05-18 RX ADMIN — POTASSIUM CHLORIDE: 2 INJECTION, SOLUTION, CONCENTRATE INTRAVENOUS at 12:11

## 2021-05-18 RX ADMIN — SODIUM CHLORIDE, PRESERVATIVE FREE 10 ML: 5 INJECTION INTRAVENOUS at 08:10

## 2021-05-18 RX ADMIN — POTASSIUM CHLORIDE: 2 INJECTION, SOLUTION, CONCENTRATE INTRAVENOUS at 09:59

## 2021-05-18 RX ADMIN — SODIUM CHLORIDE 20 ML/HR: 9 INJECTION, SOLUTION INTRAVENOUS at 09:12

## 2021-05-18 RX ADMIN — SODIUM CHLORIDE, PRESERVATIVE FREE 10 ML: 5 INJECTION INTRAVENOUS at 13:15

## 2021-05-18 RX ADMIN — PALONOSETRON 0.25 MG: 0.05 INJECTION, SOLUTION INTRAVENOUS at 09:14

## 2021-05-18 RX ADMIN — SODIUM CHLORIDE, PRESERVATIVE FREE 10 ML: 5 INJECTION INTRAVENOUS at 08:11

## 2021-05-18 RX ADMIN — CISPLATIN: 1 INJECTION INTRAVENOUS at 11:04

## 2021-05-18 RX ADMIN — FOSAPREPITANT 150 MG: 150 INJECTION, POWDER, LYOPHILIZED, FOR SOLUTION INTRAVENOUS at 09:31

## 2021-05-18 RX ADMIN — HEPARIN 500 UNITS: 100 SYRINGE at 13:15

## 2021-05-18 RX ADMIN — SODIUM CHLORIDE, PRESERVATIVE FREE 10 ML: 5 INJECTION INTRAVENOUS at 13:14

## 2021-05-18 ASSESSMENT — PAIN DESCRIPTION - PROGRESSION: CLINICAL_PROGRESSION: NOT CHANGED

## 2021-05-19 ENCOUNTER — HOSPITAL ENCOUNTER (OUTPATIENT)
Age: 51
Setting detail: OUTPATIENT SURGERY
Discharge: HOME | End: 2021-05-19
Attending: INTERNAL MEDICINE | Admitting: INTERNAL MEDICINE
Payer: COMMERCIAL

## 2021-05-19 ENCOUNTER — ANESTHESIA (OUTPATIENT)
Dept: ENDOSCOPY | Age: 51
End: 2021-05-19
Payer: COMMERCIAL

## 2021-05-19 VITALS
TEMPERATURE: 97.8 F | WEIGHT: 197 LBS | RESPIRATION RATE: 16 BRPM | HEIGHT: 72 IN | OXYGEN SATURATION: 99 % | DIASTOLIC BLOOD PRESSURE: 65 MMHG | BODY MASS INDEX: 26.68 KG/M2 | HEART RATE: 74 BPM | SYSTOLIC BLOOD PRESSURE: 114 MMHG

## 2021-05-19 VITALS — OXYGEN SATURATION: 98 % | DIASTOLIC BLOOD PRESSURE: 76 MMHG | SYSTOLIC BLOOD PRESSURE: 123 MMHG

## 2021-05-19 PROCEDURE — 7100000010 HC PHASE II RECOVERY - FIRST 15 MIN: Performed by: INTERNAL MEDICINE

## 2021-05-19 PROCEDURE — 2709999900 HC NON-CHARGEABLE SUPPLY: Performed by: INTERNAL MEDICINE

## 2021-05-19 PROCEDURE — 2580000003 HC RX 258: Performed by: NURSE ANESTHETIST, CERTIFIED REGISTERED

## 2021-05-19 PROCEDURE — 7100000001 HC PACU RECOVERY - ADDTL 15 MIN: Performed by: INTERNAL MEDICINE

## 2021-05-19 PROCEDURE — 2500000003 HC RX 250 WO HCPCS: Performed by: NURSE ANESTHETIST, CERTIFIED REGISTERED

## 2021-05-19 PROCEDURE — 3700000000 HC ANESTHESIA ATTENDED CARE: Performed by: INTERNAL MEDICINE

## 2021-05-19 PROCEDURE — 3700000001 HC ADD 15 MINUTES (ANESTHESIA): Performed by: INTERNAL MEDICINE

## 2021-05-19 PROCEDURE — 43239 EGD BIOPSY SINGLE/MULTIPLE: CPT | Performed by: INTERNAL MEDICINE

## 2021-05-19 PROCEDURE — 7100000030 HC ASPR PHASE II RECOVERY - FIRST 15 MIN: Performed by: INTERNAL MEDICINE

## 2021-05-19 PROCEDURE — 6360000002 HC RX W HCPCS: Performed by: NURSE ANESTHETIST, CERTIFIED REGISTERED

## 2021-05-19 PROCEDURE — 7100000031 HC ASPR PHASE II RECOVERY - ADDTL 15 MIN: Performed by: INTERNAL MEDICINE

## 2021-05-19 PROCEDURE — 88305 TISSUE EXAM BY PATHOLOGIST: CPT

## 2021-05-19 PROCEDURE — 2580000003 HC RX 258: Performed by: ANESTHESIOLOGY

## 2021-05-19 PROCEDURE — 7100000000 HC PACU RECOVERY - FIRST 15 MIN: Performed by: INTERNAL MEDICINE

## 2021-05-19 PROCEDURE — 7100000011 HC PHASE II RECOVERY - ADDTL 15 MIN: Performed by: INTERNAL MEDICINE

## 2021-05-19 PROCEDURE — 2500000003 HC RX 250 WO HCPCS: Performed by: ANESTHESIOLOGY

## 2021-05-19 PROCEDURE — 3609012400 HC EGD TRANSORAL BIOPSY SINGLE/MULTIPLE: Performed by: INTERNAL MEDICINE

## 2021-05-19 RX ORDER — SODIUM CHLORIDE 0.9 % (FLUSH) 0.9 %
5-40 SYRINGE (ML) INJECTION PRN
Status: DISCONTINUED | OUTPATIENT
Start: 2021-05-19 | End: 2021-05-19 | Stop reason: HOSPADM

## 2021-05-19 RX ORDER — LIDOCAINE HYDROCHLORIDE 20 MG/ML
INJECTION, SOLUTION EPIDURAL; INFILTRATION; INTRACAUDAL; PERINEURAL PRN
Status: DISCONTINUED | OUTPATIENT
Start: 2021-05-19 | End: 2021-05-19 | Stop reason: SDUPTHER

## 2021-05-19 RX ORDER — SODIUM CHLORIDE, SODIUM LACTATE, POTASSIUM CHLORIDE, CALCIUM CHLORIDE 600; 310; 30; 20 MG/100ML; MG/100ML; MG/100ML; MG/100ML
INJECTION, SOLUTION INTRAVENOUS CONTINUOUS
Status: DISCONTINUED | OUTPATIENT
Start: 2021-05-19 | End: 2021-05-19 | Stop reason: HOSPADM

## 2021-05-19 RX ORDER — SODIUM CHLORIDE 0.9 % (FLUSH) 0.9 %
5-40 SYRINGE (ML) INJECTION EVERY 12 HOURS SCHEDULED
Status: DISCONTINUED | OUTPATIENT
Start: 2021-05-19 | End: 2021-05-19 | Stop reason: HOSPADM

## 2021-05-19 RX ORDER — SODIUM CHLORIDE 9 MG/ML
25 INJECTION, SOLUTION INTRAVENOUS PRN
Status: DISCONTINUED | OUTPATIENT
Start: 2021-05-19 | End: 2021-05-19 | Stop reason: HOSPADM

## 2021-05-19 RX ORDER — SODIUM CHLORIDE, SODIUM LACTATE, POTASSIUM CHLORIDE, CALCIUM CHLORIDE 600; 310; 30; 20 MG/100ML; MG/100ML; MG/100ML; MG/100ML
INJECTION, SOLUTION INTRAVENOUS CONTINUOUS PRN
Status: DISCONTINUED | OUTPATIENT
Start: 2021-05-19 | End: 2021-05-19 | Stop reason: SDUPTHER

## 2021-05-19 RX ORDER — LIDOCAINE HYDROCHLORIDE 10 MG/ML
1 INJECTION, SOLUTION EPIDURAL; INFILTRATION; INTRACAUDAL; PERINEURAL
Status: COMPLETED | OUTPATIENT
Start: 2021-05-19 | End: 2021-05-19

## 2021-05-19 RX ORDER — PROPOFOL 10 MG/ML
INJECTION, EMULSION INTRAVENOUS CONTINUOUS PRN
Status: DISCONTINUED | OUTPATIENT
Start: 2021-05-19 | End: 2021-05-19 | Stop reason: SDUPTHER

## 2021-05-19 RX ADMIN — PROPOFOL 200 MCG/KG/MIN: 10 INJECTION, EMULSION INTRAVENOUS at 12:25

## 2021-05-19 RX ADMIN — LIDOCAINE HYDROCHLORIDE 1 ML: 10 INJECTION, SOLUTION EPIDURAL; INFILTRATION; INTRACAUDAL; PERINEURAL at 12:08

## 2021-05-19 RX ADMIN — SODIUM CHLORIDE, POTASSIUM CHLORIDE, SODIUM LACTATE AND CALCIUM CHLORIDE: 600; 310; 30; 20 INJECTION, SOLUTION INTRAVENOUS at 12:22

## 2021-05-19 RX ADMIN — SODIUM CHLORIDE, POTASSIUM CHLORIDE, SODIUM LACTATE AND CALCIUM CHLORIDE: 600; 310; 30; 20 INJECTION, SOLUTION INTRAVENOUS at 12:11

## 2021-05-19 RX ADMIN — LIDOCAINE HYDROCHLORIDE 100 MG: 20 INJECTION, SOLUTION EPIDURAL; INFILTRATION; INTRACAUDAL at 12:25

## 2021-05-19 ASSESSMENT — ENCOUNTER SYMPTOMS
VOICE CHANGE: 1
TROUBLE SWALLOWING: 1
WHEEZING: 1
SORE THROAT: 1
RHINORRHEA: 0
SHORTNESS OF BREATH: 1
COUGH: 1

## 2021-05-19 ASSESSMENT — PULMONARY FUNCTION TESTS
PIF_VALUE: 1

## 2021-05-19 ASSESSMENT — LIFESTYLE VARIABLES: SMOKING_STATUS: 1

## 2021-05-19 ASSESSMENT — PAIN SCALES - GENERAL
PAINLEVEL_OUTOF10: 0
PAINLEVEL_OUTOF10: 0

## 2021-05-19 NOTE — H&P
locally advanced with LN metastases, [id number].   2. Tobacco avuse  3. ETOH abuse  4. Undergoing chemoradiation     RECOMMENDATIONS:  1. I reviewed the laboratory data, imaging studies, biopsy results, diagnosis and treatment recommendations. 2. The patient handled the first week of chemoradiation fairly well  3. Labs were reviewed, so far he is tolerating treatment fairly well. No significant side effect  4. We talked about smoking cessation. He will try, refused the Chantix or the patches  5. Overall, I am encouraged by his excellent tolerance to treatment. We will continue on without any changes. 6. We talked about the importance of his nutrition. We will watch his weight closely. We will continue weekly hydration and supportive care. EGD QUESTIONNAIRE  LAST EGD: 2019. ABD PAIN: Intermittent, generalized. CONSTIPATION: Denies. DIARRHEA: Denies. BLOOD IN STOOL/BLACK, TARRY STOOLS: Denies. NAUSEA/VOMITING/HEMATEMESIS: Denies- hx of hemoptysis noted per chart. FEVER/CHILLS: Denies. APPETITE CHANGE: Lack of appetite. RECENT UNINTENDED WEIGHT LOSS: Denies. DIFFICULTY SWALLOWING/PHARYNGITIS/VOICE CHANGE: He does have difficulty swallowing, notes hoarseness of his voice, as well as pharyngitis. ADDITIONAL GI HX: PUD, gastritis. GI MEDICATIONS: Compazine PRN, Zofran PRN, omeprazole, Carafate.   Per Dr. Burciaga Mess note 4-22-21, patient will need PEG in the future- see section of this note below:  HISTORY OF PRESENT ILLNESS: Pedro Hamlin is a 44 y.o. male , referred for evaluation squamous cell carcinoma of the neck, needs for PEG, history of peptic ulcer disease and GI bleed, heavy alcohol use and history of alcoholic liver disease  Patient was seen by our group in 2019 at [facility] at that time he was having bleeding, in addition to issues with alcoholic hepatitis, unfortunately still drinking very heavy 12 packs a day, he also had heavy smoking  And at that time he was also having some suicidal ideation  Patient had an EGD on the last day of his admission by Dr. Jasper Messer, result as below showed multiple tiny gastric ulcers and gastritis. No esophageal or gastric varices. Patient in March presented with some neck pain and found to have large lymph nodes, and diagnosed with a squamous cell carcinoma of the neck as below  Been followed by his radiation oncologist and he was referred to us for EGD to follow on the gastric ulcers, and placed PEG tube because he lost 27 pounds  He does have dysphagia and he feels the food sometimes gets stuck in his throat.   Details about his cancer from the radiation oncology notes is as follows:     history of alcohol abuse and tobacco abuse presents for consultation today for a newly diagnosed laryngeal squamous cell carcinoma.  Patient has been noticing a progressive difficulty with swallowing as well as hemoptysis and hematemesis.  Patient was not sure whether his bleeding was coming from and was having worsening shortness of breath and therefore presented to the ED.  Patient underwent a chest x-ray which showed acute pulmonary processes though he had a CT of the chest for PE which showed no pulmonary embolism but noted abnormal lymph nodes in the left side of the neck.  Patient underwent a CT scan of the neck on March 15, 2021 which showed a 3.3 cm solid mass at the left piriform recess invading the superior margin of the right lobe of the thyroid gland as well as left thyroid cartilage along with the left paravertebral spaces from C4-C7.  There was left-sided level three and four lymphadenopathy.  Patient underwent ultrasound-guided biopsy the next day which confirmed a squamous cell carcinoma.  Patient was seen by oncology who recommended patient undergo a PET scan which was done on April 7, 2021.  Patient's PET scan showed uptake in the neck as well as potentially small pulmonary nodules at the base and mild uptake at the fundus of the stomach.  Patient does report having significant abdominal pain as well as continued hematoemesis and melena.  Patient does have a history of a bleeding gastric ulcer.  Patient last had a EGD in 2019  Stage IVB (cT4b, cN2b, cM0)        egd [facility] 2019:  IMPRESSION:     No signs of active bleeding. No high risk lesions. No residual blood.    1) No esophagitis, no esophageal varices present. Regular Z-line and GEJ  2) Very mild erosive antritis, likely source of prior bleeding. Low risk. 3) Subtle evidence of portal hypertensive gastropathy. 4) No large ulcerations, no gastric varices, no ectopic varices, no large erosions, no MWT. 5) Mild duodenitis     Review of additional significant medical hx:  HTN: Denies chest pain, palpitations, SOB, dizziness, leg swelling, + headache hx, none currently. He does c/o b/l ear pain- states radiation specialist told him this is to be expected. Current medications r/t condition: None. COPD: States he has chronic cough- + phlegm. States mild SOB/wheezing today. Does not use any inhalers, states he quit using them. SEIZURE: States he has had a \"few\"- last per patient was a couple of years ago, thinks ETOH related- not currently on antiepeleptic. He states he currently drinks ETOH occasionally, a 6 pack of beer over a week or so. Liver disease: Noted per chart, patient unaware of hx of this. NSVT: Noted per chart, patient unaware of hx of this. NPO status: Patient states they have been NPO since before midnight. Medications taken TODAY (with sip of water): None. Anticoagulation status: Patient denies taking any anti-coagulants, including aspirin currently. Pertinent family hx: Denies family hx of esophageal CA, sister had colon CA. Denies personal hx of blood clots. Denies personal hx of MRSA infection. Denies any personal or family hx of previous complications w/anesthesia.   Nicotine status: Current smoker- last smoked right before arrival.  PAST MEDICAL HISTORY Past Medical History:   Diagnosis Date    Alcohol abuse     Anemia     Anxiety     COPD (chronic obstructive pulmonary disease) ([address]     Depression     Gastritis 09/24/2019    Mild chronic inactive gastritis    Head injury with loss of consciousness ([address]     Headache     Hypertension     Laryngeal cancer ([address]     W/metastasis to cervical lymph node, currently receiving chemo/radiation    Liver disease     Marijuana use     Migraine     NSVT (nonsustained ventricular tachycardia) ([address] 09/13/2019    Oropharyngeal dysphagia 12/13/2019    PUD (peptic ulcer disease)     Seizure disorder ([address] 09/13/2019    Wernicke-Korsakoff psychosis ([facility] 11/08/2014       SURGICAL HISTORY       Past Surgical History:   Procedure Laterality Date    ANKLE SURGERY  age 10    left    ANKLE SURGERY Right     Plates and screws    ENDOSCOPY, COLON, DIAGNOSTIC      IR PORT PLACEMENT EQUAL OR GREATER THAN 5 YEARS  04/06/2021    IR PORT PLACEMENT EQUAL OR GREATER THAN 5 YEARS 4/6/2021 [facility] SPECIAL PROCEDURES    UPPER GASTROINTESTINAL ENDOSCOPY N/A 09/17/2019    EGD BIOPSY performed by Joe Lynn MD at [address] [place] [address]  03/16/2021    US LYMPH NODE BIOPSY 3/16/2021 [facility]       SOCIAL HISTORY       Social History     Socioeconomic History    Marital status: Single     Spouse name: None    Number of children: None    Years of education: None    Highest education level: None   Occupational History    None   Tobacco Use    Smoking status: Current Every Day Smoker     Packs/day: 2.00    Smokeless tobacco: Never Used   Vaping Use    Vaping Use: Never used   Substance and Sexual Activity    Alcohol use: Yes     Comment: 12 beers daily- as of 4/22/21 pt states occasional    Drug use: Yes     Types: Marijuana     Comment: daily    Sexual activity: None   Other Topics Concern    None   Social History Narrative    None     Social Determinants of Health     Financial Resource Strain:     Difficulty of Paying Living Expenses:    Food Insecurity:     Worried About Running Out of Food in the Last Year:     [address] in the Last Year:    Transportation Needs:     Lack of Transportation (Medical):  Lack of Transportation (Non-Medical):    Physical Activity:     Days of Exercise per Week:     Minutes of Exercise per Session:    Stress:     Feeling of Stress :    Social Connections:     Frequency of Communication with Friends and Family:     Frequency of Social Gatherings with Friends and Family:     Attends Shinto Services:     Active Member of Clubs or Organizations:     Attends Club or Organization Meetings:     Marital Status:    Intimate Partner Violence:     Fear of Current or Ex-Partner:     Emotionally Abused:     Physically Abused:     Sexually Abused:        REVIEW OF SYSTEMS    No Known Allergies    No current facility-administered medications on file prior to encounter. Current Outpatient Medications on File Prior to Encounter   Medication Sig Dispense Refill    prochlorperazine (COMPAZINE) 10 MG tablet Take 1 tablet by mouth every 6 hours as needed (nausea) 60 tablet 2    lidocaine-prilocaine (EMLA) 2.5-2.5 % cream Apply topically to port site 60 minutes before access as needed.  1 Tube 3    ondansetron (ZOFRAN ODT) 8 MG TBDP disintegrating tablet Take 1 tablet by mouth every 8 hours as needed for Nausea 30 tablet 2    omeprazole (PRILOSEC) 20 MG delayed release capsule Take 1 capsule by mouth daily 30 capsule 3    sucralfate (CARAFATE) 1 GM/10ML suspension Take 10 mLs by mouth 4 times daily 420 mL 3    [DISCONTINUED] ibuprofen (IBU) 600 MG tablet Take 1 tablet by mouth every 6 hours as needed for Pain (Patient not taking: Reported on 3/31/2021) 60 tablet 0    [DISCONTINUED] pantoprazole (PROTONIX) 40 MG tablet Take 1 tablet by mouth daily (Patient not taking: Reported on 3/31/2021) 30 tablet 3     (Notation: Medications listed above are not currently reconciled at the signing of this H&P note, to be reconciled in pre-op per RN)    Review of Systems   Constitutional: Negative for chills and fever. HENT: Positive for congestion, ear pain, sore throat, trouble swallowing and voice change. Negative for rhinorrhea. Respiratory: Positive for cough, shortness of breath and wheezing. Cardiovascular: Negative for chest pain, palpitations and leg swelling. Gastrointestinal:        SEE HPI. Genitourinary: Negative. Neurological: Negative for dizziness and headaches. Hematological: Bruises/bleeds easily. Psychiatric/Behavioral: Positive for dysphoric mood (Mood stable- denies SI/HI). GENERAL PHYSICAL EXAM     Vitals: Review current vital signs per RN flow sheet. GENERAL APPEARANCE:   Diana Maria is 44 y.o.,  male, not obese, nourished, conscious, alert. Does not appear to be in any distress or pain at this time. Physical Exam  Constitutional:       General: He is not in acute distress. Appearance: He is well-developed. He is not ill-appearing, toxic-appearing or diaphoretic. HENT:      Head: Normocephalic. Right Ear: Tympanic membrane, ear canal and external ear normal.      Left Ear: Tympanic membrane, ear canal and external ear normal.      Mouth/Throat:      Pharynx: No oropharyngeal exudate or posterior oropharyngeal erythema. Tonsils: No tonsillar abscesses. Eyes:      General:         Right eye: No discharge. Left eye: No discharge. Conjunctiva/sclera: Conjunctivae normal.      Pupils: Pupils are equal, round, and reactive to light. Cardiovascular:      Rate and Rhythm: Normal rate and regular rhythm. Pulses: Intact distal pulses. Radial pulses are 2+ on the right side and 2+ on the left side. Dorsalis pedis pulses are 2+ on the right side and 2+ on the left side. Posterior tibial pulses are 2+ on the right side and 2+ on the left side. Heart sounds: Normal heart sounds. Pulmonary:      Effort: Pulmonary effort is normal. No accessory muscle usage or respiratory distress. Breath sounds: Normal breath sounds. No decreased breath sounds, wheezing, rhonchi or rales. Abdominal:      General: Bowel sounds are normal. There is no distension. Palpations: Abdomen is soft. There is no mass. Tenderness: There is no abdominal tenderness. There is no guarding or rebound. Musculoskeletal:         General: Normal range of motion. Right lower leg: Swelling present. No tenderness. Edema present. Left lower leg: No swelling or tenderness. No edema. Right ankle: Swelling present. Comments: Mild non-pitting swelling to RLE, moderate swelling to right ankle which patient states is chronic since ankle surgery. No erythema, warmth to b/l LE's, negative Yousif's sign b/l. Skin:     General: Skin is warm and dry. Findings: Petechiae (LLE- patient states possibly from itching) present. Neurological:      Mental Status: He is alert and oriented to person, place, and time.    Psychiatric:         Behavior: Behavior normal.         RECENT LAB WORK     Lab Results   Component Value Date     05/18/2021    K 3.8 05/18/2021    CL 99 05/18/2021    CO2 26 05/18/2021    BUN 4 (L) 05/18/2021    CREATININE 0.52 (L) 05/18/2021    GLUCOSE 131 (H) 05/18/2021    CALCIUM 9.5 05/18/2021    PROT 7.2 05/18/2021    LABALBU 3.9 05/18/2021    BILITOT 0.31 05/18/2021    ALKPHOS 113 05/18/2021    AST 17 05/18/2021    ALT 17 05/18/2021    LABGLOM >60 05/18/2021    GFRAA >60 05/18/2021    GLOB NOT REPORTED 03/16/2021     Lab Results   Component Value Date    WBC 5.8 05/18/2021    HGB 11.6 (L) 05/18/2021    HCT 35.4 (L) 05/18/2021    MCV 98.9 05/18/2021    PLT See Reflexed IPF Result 05/18/2021     PROVISIONAL DIAGNOSES / SURGERY:      PEPTIC ULCER DISEASE/ LARYNGEAL CANCER    EGD    Patient Active Problem List    Diagnosis Date Noted    Encounter for care related to vascular access port 04/09/2021    Hemoptysis 04/07/2021    Peptic ulcer disease 04/07/2021    [facility] 03/31/2021    Neck mass 03/15/2021    Frostbite of both feet 03/15/2021    Major depressive disorder, recurrent ([address] 06/08/2020    Chest pain 06/07/2020    Chronic cough 12/13/2019    Primary insomnia 12/13/2019    Oropharyngeal dysphagia 12/13/2019    Major depression, recurrent ([address] 10/10/2019    Gastritis 09/24/2019    Bipolar I disorder, most recent episode depressed ([address] 09/17/2019    Seizure disorder ([address] 09/13/2019    NSVT (nonsustained ventricular tachycardia) ([address] 09/13/2019    Suicidal ideation 09/13/2019    Bipolar affective disorder, currently depressed, mild ([address]     Alcohol use disorder, severe, dependence ([address]     Wernicke-Korsakoff psychosis ([address] 11/08/2014    Hypertension 11/08/2014    Alcoholism ([address] 11/08/2014    Malnutrition ([address] 11/08/2014    Tobacco abuse 11/08/2014           KODY Erazo - CNP on 5/19/2021 at 10:49 AM

## 2021-05-19 NOTE — ANESTHESIA PRE PROCEDURE
Department of Anesthesiology  Preprocedure Note       Name:  Kizzy Szymanski   Age:  44 y.o.  :  1970                                          MRN:  [id number]         Date:  2021      Surgeon: Jorge Anderson):  Sarah Suazo MD    Procedure: Procedure(s):  EGD    Medications prior to admission:   Prior to Admission medications    Medication Sig Start Date End Date Taking? Authorizing Provider   prochlorperazine (COMPAZINE) 10 MG tablet Take 1 tablet by mouth every 6 hours as needed (nausea) 21   Adrian Sawyer MD   lidocaine-prilocaine (EMLA) 2.5-2.5 % cream Apply topically to port site 60 minutes before access as needed.  21   Lisa Bojorquez MD   ondansetron (ZOFRAN ODT) 8 MG TBDP disintegrating tablet Take 1 tablet by mouth every 8 hours as needed for Nausea 21   Lisa Bojorquez MD   omeprazole (PRILOSEC) 20 MG delayed release capsule Take 1 capsule by mouth daily 21   Uma Tong MD   sucralfate (CARAFATE) 1 GM/10ML suspension Take 10 mLs by mouth 4 times daily 21   Uma Tong MD   ibuprofen (IBU) 600 MG tablet Take 1 tablet by mouth every 6 hours as needed for Pain  Patient not taking: Reported on 3/31/2021 2/14/21 4/7/21  Matthew Reyes DO   pantoprazole (PROTONIX) 40 MG tablet Take 1 tablet by mouth daily  Patient not taking: Reported on 3/31/2021 6/16/20 4/7/21  Karine Greenberg MD       Current medications:    Current Facility-Administered Medications   Medication Dose Route Frequency Provider Last Rate Last Admin    lactated ringers infusion   Intravenous Continuous Cuauhtemoc Santos MD        sodium chloride flush 0.9 % injection 5-40 mL  5-40 mL Intravenous 2 times per day Cuauhtemoc Santos MD        sodium chloride flush 0.9 % injection 5-40 mL  5-40 mL Intravenous PRN Cuauhtemoc Santos MD        0.9 % sodium chloride infusion  25 mL Intravenous PRN Cuauhtemoc Santos MD        lidocaine PF 1 % injection 1 mL  1 mL Intradermal Once PRN Linda Mandujano

## 2021-05-19 NOTE — ANESTHESIA POSTPROCEDURE EVALUATION
Department of Anesthesiology  Postprocedure Note    Patient: Luis Ibarra  MRN: [id number]  YOB: 1970  Date of evaluation: 5/19/2021  Time:  3:47 PM     Procedure Summary     Date: 05/19/21 Room / Location: [facility] 01 / Rosita Simpson ENDO    Anesthesia Start: 3208 Anesthesia Stop: 7679    Procedure: EGD BIOPSY (N/A Esophagus) Diagnosis: (PEPTIC ULCER DISEASE / LARYNGEAL CANCER)    Surgeons: Shirley Gates MD Responsible Provider: Nato Mina MD    Anesthesia Type: MAC ASA Status: 3          Anesthesia Type: MAC    Taras Phase I: Taras Score: 10    Taras Phase II: Taras Score: 10    Last vitals: Reviewed and per EMR flowsheets.        Anesthesia Post Evaluation    Comments: POST- ANESTHESIA EVALUATION       Pt Name: Luis Ibarra  MRN: [id number]  YOB: 1970  Date of evaluation: 5/19/2021  Time:  3:47 PM      /65   Pulse 74   Temp 97.8 °F (36.6 °C) (Temporal)   Resp 16   Ht 6' (1.829 m)   Wt 197 lb (89.4 kg)   SpO2 99%   BMI 26.72 kg/m²      Consciousness Level  Awake  Cardiopulmonary Status  Stable  Pain Adequately Treated YES  Nausea / Vomiting  NO  Adequate Hydration  YES  Anesthesia Related Complications NONE      Electronically signed by Nato Mina MD on 5/19/2021 at 3:47 PM

## 2021-05-19 NOTE — OP NOTE
Operative Note  PROCEDURE NOTE    DATE OF PROCEDURE: 5/19/2021     SURGEON: Irasema Frazier MD  Facility: [facility]  ASSISTANT: None  Anesthesia: MAC  PREOPERATIVE DIAGNOSIS:   History of throat cancer  History of peptic ulcer disease  Losing weight, initially the patient was agreeable to have a PEG tube placed but today he does not want to have it. Diagnosis:  Throat lesion most likely where the patient's tumor is in the left side in the piriform sinus     Gastritis biopsies were taken        POSTOPERATIVE DIAGNOSIS: As described below    OPERATION: Upper GI endoscopy with Biopsy    ANESTHESIA: Moderate Sedation     ESTIMATED BLOOD LOSS: Less than 50 ml    COMPLICATIONS: None. SPECIMENS:  Was Obtained:     Throat lesion most likely where the patient's tumor is in the left side in the piriform sinus     Gastritis biopsies were taken    HISTORY: The patient is a 44y.o. year old male with history of above preop diagnosis. I recommended esophagogastroduodenoscopy with possible biopsy and I explained the risk, benefits, expected outcome, and alternatives to the procedure. Risks included but are not limited to bleeding, infection, respiratory distress, hypotension, and perforation of the esophagus, stomach, or duodenum. Patient understands and is in agreement. The patient was counseled at length about the risks of imtiaz Covid-19 during their perioperative period and any recovery window from their procedure. The patient was made aware that imtiaz Covid-19  may worsen their prognosis for recovering from their procedure  and lend to a higher morbidity and/or mortality risk. All material risks, benefits, and reasonable alternatives including postponing the procedure were discussed. The patient does wish to proceed with the procedure at this time. PROCEDURE: The patient was given IV conscious sedation. The patient's SPO2 remained above 90% throughout the procedure. The gastroscope was inserted orally and advanced under direct vision through the esophagus, through the stomach, through the pylorus, and into the descending duodenum. Post sedation note : The patient's SPO2 remained above 90% throughout the procedure. the vital signs remained stable , and no immediate complication form the procedure noted, patient will be ready for d/c when criteria is met . Findings:    Retropharyngeal area Throat lesion most likely where the patient's tumor is in the left side in the piriform sinus         Esophagus: normal    Stomach:    Fundus: abnormal: Gastritis biopsies were taken     Body: abnormal: Gastritis biopsies were taken     Antrum: abnormal: Gastritis biopsies were taken     Duodenum:     Descending: normal    Bulb: normal    The scope was removed and the patient tolerated the procedure well. Recommendations/Plan:   1. F/U Biopsies  2. F/U In Office in 3-4 weeks  3.  Discussed with the family    Electronically signed by Jersey Fernandes MD  on 5/19/2021 at 12:34 PM

## 2021-05-20 LAB — SURGICAL PATHOLOGY REPORT: NORMAL

## 2021-05-26 ENCOUNTER — HOSPITAL ENCOUNTER (OUTPATIENT)
Dept: INFUSION THERAPY | Age: 51
Discharge: HOME | End: 2021-05-26
Payer: COMMERCIAL

## 2021-05-26 ENCOUNTER — OFFICE VISIT (OUTPATIENT)
Dept: ONCOLOGY | Age: 51
End: 2021-05-26
Payer: COMMERCIAL

## 2021-05-26 VITALS
WEIGHT: 193 LBS | RESPIRATION RATE: 20 BRPM | DIASTOLIC BLOOD PRESSURE: 80 MMHG | HEIGHT: 72 IN | TEMPERATURE: 98 F | HEART RATE: 110 BPM | SYSTOLIC BLOOD PRESSURE: 137 MMHG | BODY MASS INDEX: 26.14 KG/M2

## 2021-05-26 VITALS
HEART RATE: 110 BPM | BODY MASS INDEX: 26.14 KG/M2 | HEIGHT: 72 IN | RESPIRATION RATE: 18 BRPM | SYSTOLIC BLOOD PRESSURE: 137 MMHG | TEMPERATURE: 98 F | WEIGHT: 193 LBS | DIASTOLIC BLOOD PRESSURE: 80 MMHG

## 2021-05-26 DIAGNOSIS — C32.9 LARYNGEAL CANCER (HCC): Primary | ICD-10-CM

## 2021-05-26 DIAGNOSIS — Z45.2 ENCOUNTER FOR CARE RELATED TO VASCULAR ACCESS PORT: ICD-10-CM

## 2021-05-26 LAB
ABSOLUTE EOS #: 0.1 K/UL (ref 0–0.44)
ABSOLUTE IMMATURE GRANULOCYTE: 0 K/UL (ref 0–0.3)
ABSOLUTE LYMPH #: 0.5 K/UL (ref 1.1–3.7)
ABSOLUTE MONO #: 0.26 K/UL (ref 0.1–1.2)
ALBUMIN SERPL-MCNC: 3.9 G/DL (ref 3.5–5.2)
ALBUMIN/GLOBULIN RATIO: 1 (ref 1–2.5)
ALP BLD-CCNC: 102 U/L (ref 40–129)
ALT SERPL-CCNC: 26 U/L (ref 5–41)
ANION GAP SERPL CALCULATED.3IONS-SCNC: 15 MMOL/L (ref 9–17)
AST SERPL-CCNC: 33 U/L
BASOPHILS # BLD: 2 % (ref 0–2)
BASOPHILS ABSOLUTE: 0.07 K/UL (ref 0–0.2)
BILIRUB SERPL-MCNC: 0.41 MG/DL (ref 0.3–1.2)
BUN BLDV-MCNC: 4 MG/DL (ref 6–20)
BUN/CREAT BLD: 7 (ref 9–20)
CALCIUM SERPL-MCNC: 9.5 MG/DL (ref 8.6–10.4)
CHLORIDE BLD-SCNC: 98 MMOL/L (ref 98–107)
CO2: 25 MMOL/L (ref 20–31)
CREAT SERPL-MCNC: 0.57 MG/DL (ref 0.7–1.2)
DIFFERENTIAL TYPE: ABNORMAL
EOSINOPHILS RELATIVE PERCENT: 3 % (ref 1–4)
GFR AFRICAN AMERICAN: >60 ML/MIN
GFR NON-AFRICAN AMERICAN: >60 ML/MIN
GFR SERPL CREATININE-BSD FRML MDRD: ABNORMAL ML/MIN/{1.73_M2}
GFR SERPL CREATININE-BSD FRML MDRD: ABNORMAL ML/MIN/{1.73_M2}
GLUCOSE BLD-MCNC: 140 MG/DL (ref 70–99)
HCT VFR BLD CALC: 36 % (ref 40.7–50.3)
HEMOGLOBIN: 11.8 G/DL (ref 13–17)
IMMATURE GRANULOCYTES: 0 %
LYMPHOCYTES # BLD: 15 % (ref 24–43)
MAGNESIUM: 1.9 MG/DL (ref 1.6–2.6)
MCH RBC QN AUTO: 32.2 PG (ref 25.2–33.5)
MCHC RBC AUTO-ENTMCNC: 32.8 G/DL (ref 28.4–34.8)
MCV RBC AUTO: 98.4 FL (ref 82.6–102.9)
MONOCYTES # BLD: 8 % (ref 3–12)
MORPHOLOGY: NORMAL
NRBC AUTOMATED: 0 PER 100 WBC
PDW BLD-RTO: 17.3 % (ref 11.8–14.4)
PLATELET # BLD: 131 K/UL (ref 138–453)
PLATELET ESTIMATE: ABNORMAL
PMV BLD AUTO: 10.8 FL (ref 8.1–13.5)
POTASSIUM SERPL-SCNC: 3.6 MMOL/L (ref 3.7–5.3)
RBC # BLD: 3.66 M/UL (ref 4.21–5.77)
RBC # BLD: ABNORMAL 10*6/UL
SEG NEUTROPHILS: 72 % (ref 36–65)
SEGMENTED NEUTROPHILS ABSOLUTE COUNT: 2.37 K/UL (ref 1.5–8.1)
SODIUM BLD-SCNC: 138 MMOL/L (ref 135–144)
TOTAL PROTEIN: 7.7 G/DL (ref 6.4–8.3)
WBC # BLD: 3.3 K/UL (ref 3.5–11.3)
WBC # BLD: ABNORMAL 10*3/UL

## 2021-05-26 PROCEDURE — 99214 OFFICE O/P EST MOD 30 MIN: CPT | Performed by: INTERNAL MEDICINE

## 2021-05-26 PROCEDURE — 96375 TX/PRO/DX INJ NEW DRUG ADDON: CPT

## 2021-05-26 PROCEDURE — 2580000003 HC RX 258: Performed by: INTERNAL MEDICINE

## 2021-05-26 PROCEDURE — 96366 THER/PROPH/DIAG IV INF ADDON: CPT

## 2021-05-26 PROCEDURE — 3017F COLORECTAL CA SCREEN DOC REV: CPT | Performed by: INTERNAL MEDICINE

## 2021-05-26 PROCEDURE — 83735 ASSAY OF MAGNESIUM: CPT

## 2021-05-26 PROCEDURE — 96361 HYDRATE IV INFUSION ADD-ON: CPT

## 2021-05-26 PROCEDURE — 6360000002 HC RX W HCPCS: Performed by: INTERNAL MEDICINE

## 2021-05-26 PROCEDURE — 4004F PT TOBACCO SCREEN RCVD TLK: CPT | Performed by: INTERNAL MEDICINE

## 2021-05-26 PROCEDURE — G8419 CALC BMI OUT NRM PARAM NOF/U: HCPCS | Performed by: INTERNAL MEDICINE

## 2021-05-26 PROCEDURE — G8427 DOCREV CUR MEDS BY ELIG CLIN: HCPCS | Performed by: INTERNAL MEDICINE

## 2021-05-26 PROCEDURE — 96413 CHEMO IV INFUSION 1 HR: CPT

## 2021-05-26 PROCEDURE — 85025 COMPLETE CBC W/AUTO DIFF WBC: CPT

## 2021-05-26 PROCEDURE — 80053 COMPREHEN METABOLIC PANEL: CPT

## 2021-05-26 PROCEDURE — 36591 DRAW BLOOD OFF VENOUS DEVICE: CPT

## 2021-05-26 PROCEDURE — 96367 TX/PROPH/DG ADDL SEQ IV INF: CPT

## 2021-05-26 RX ORDER — SODIUM CHLORIDE 0.9 % (FLUSH) 0.9 %
10 SYRINGE (ML) INJECTION PRN
Status: CANCELLED | OUTPATIENT
Start: 2021-06-09

## 2021-05-26 RX ORDER — SODIUM CHLORIDE 9 MG/ML
20 INJECTION, SOLUTION INTRAVENOUS ONCE
Status: CANCELLED | OUTPATIENT
Start: 2021-06-09 | End: 2021-06-09

## 2021-05-26 RX ORDER — SODIUM CHLORIDE 9 MG/ML
20 INJECTION, SOLUTION INTRAVENOUS ONCE
Status: DISCONTINUED | OUTPATIENT
Start: 2021-05-26 | End: 2021-05-27 | Stop reason: HOSPADM

## 2021-05-26 RX ORDER — SODIUM CHLORIDE 0.9 % (FLUSH) 0.9 %
10 SYRINGE (ML) INJECTION PRN
Status: DISCONTINUED | OUTPATIENT
Start: 2021-05-26 | End: 2021-05-27 | Stop reason: HOSPADM

## 2021-05-26 RX ORDER — SODIUM CHLORIDE 9 MG/ML
INJECTION, SOLUTION INTRAVENOUS CONTINUOUS
Status: CANCELLED | OUTPATIENT
Start: 2021-06-09

## 2021-05-26 RX ORDER — METHYLPREDNISOLONE SODIUM SUCCINATE 125 MG/2ML
125 INJECTION, POWDER, LYOPHILIZED, FOR SOLUTION INTRAMUSCULAR; INTRAVENOUS ONCE
Status: CANCELLED | OUTPATIENT
Start: 2021-06-09 | End: 2021-06-09

## 2021-05-26 RX ORDER — PALONOSETRON 0.05 MG/ML
0.25 INJECTION, SOLUTION INTRAVENOUS ONCE
Status: CANCELLED | OUTPATIENT
Start: 2021-06-09

## 2021-05-26 RX ORDER — HEPARIN SODIUM (PORCINE) LOCK FLUSH IV SOLN 100 UNIT/ML 100 UNIT/ML
500 SOLUTION INTRAVENOUS PRN
Status: DISCONTINUED | OUTPATIENT
Start: 2021-05-26 | End: 2021-05-27 | Stop reason: HOSPADM

## 2021-05-26 RX ORDER — PALONOSETRON 0.05 MG/ML
0.25 INJECTION, SOLUTION INTRAVENOUS ONCE
Status: COMPLETED | OUTPATIENT
Start: 2021-05-26 | End: 2021-05-26

## 2021-05-26 RX ORDER — SODIUM CHLORIDE 9 MG/ML
25 INJECTION, SOLUTION INTRAVENOUS PRN
Status: CANCELLED | OUTPATIENT
Start: 2021-06-09

## 2021-05-26 RX ORDER — HEPARIN SODIUM (PORCINE) LOCK FLUSH IV SOLN 100 UNIT/ML 100 UNIT/ML
500 SOLUTION INTRAVENOUS PRN
Status: CANCELLED | OUTPATIENT
Start: 2021-06-09

## 2021-05-26 RX ORDER — SODIUM CHLORIDE 0.9 % (FLUSH) 0.9 %
5 SYRINGE (ML) INJECTION PRN
Status: CANCELLED | OUTPATIENT
Start: 2021-06-09

## 2021-05-26 RX ORDER — EPINEPHRINE 1 MG/ML
0.3 INJECTION, SOLUTION, CONCENTRATE INTRAVENOUS PRN
Status: CANCELLED | OUTPATIENT
Start: 2021-06-09

## 2021-05-26 RX ORDER — DIPHENHYDRAMINE HYDROCHLORIDE 50 MG/ML
50 INJECTION INTRAMUSCULAR; INTRAVENOUS ONCE
Status: CANCELLED | OUTPATIENT
Start: 2021-06-09 | End: 2021-06-09

## 2021-05-26 RX ADMIN — SODIUM CHLORIDE, PRESERVATIVE FREE 10 ML: 5 INJECTION INTRAVENOUS at 13:35

## 2021-05-26 RX ADMIN — CISPLATIN: 1 INJECTION INTRAVENOUS at 11:18

## 2021-05-26 RX ADMIN — SODIUM CHLORIDE 20 ML/HR: 9 INJECTION, SOLUTION INTRAVENOUS at 09:21

## 2021-05-26 RX ADMIN — POTASSIUM CHLORIDE: 2 INJECTION, SOLUTION, CONCENTRATE INTRAVENOUS at 10:14

## 2021-05-26 RX ADMIN — POTASSIUM CHLORIDE: 2 INJECTION, SOLUTION, CONCENTRATE INTRAVENOUS at 12:28

## 2021-05-26 RX ADMIN — PALONOSETRON 0.25 MG: 0.05 INJECTION, SOLUTION INTRAVENOUS at 09:22

## 2021-05-26 RX ADMIN — SODIUM CHLORIDE, PRESERVATIVE FREE 10 ML: 5 INJECTION INTRAVENOUS at 08:08

## 2021-05-26 RX ADMIN — FOSAPREPITANT 150 MG: 150 INJECTION, POWDER, LYOPHILIZED, FOR SOLUTION INTRAVENOUS at 09:47

## 2021-05-26 RX ADMIN — SODIUM CHLORIDE, PRESERVATIVE FREE 10 ML: 5 INJECTION INTRAVENOUS at 08:09

## 2021-05-26 RX ADMIN — DEXAMETHASONE SODIUM PHOSPHATE 12 MG: 4 INJECTION, SOLUTION INTRAMUSCULAR; INTRAVENOUS at 09:32

## 2021-05-26 RX ADMIN — HEPARIN 500 UNITS: 100 SYRINGE at 13:35

## 2021-05-26 RX ADMIN — SODIUM CHLORIDE, PRESERVATIVE FREE 10 ML: 5 INJECTION INTRAVENOUS at 13:34

## 2021-05-26 ASSESSMENT — PAIN DESCRIPTION - PROGRESSION: CLINICAL_PROGRESSION: NOT CHANGED

## 2021-05-26 NOTE — PROGRESS NOTES
Chief Complaint   Patient presents with    Follow-up     laryngeal cancer    Numbness     Patient states has some numbness in his feet    Dizziness    Shortness of Breath    Anorexia     Patient states hasnt really been eating much he states that everything tastes bad and he doesnt drink his protein shakes like he should    Rash     Patient presents with red dots on his legs and states it does itch soometimes     DIAGNOSIS:       1.   Laryngeal squamous cell carcinoma, locally advanced with LN metastases, [id number]   2. Tobacco avuse  3. ETOH abuse  CURRENT THERAPY:         Plan for combined chemoradiation after completing staging work-up  Chemoradiation was started on 4/28/2021. Radiation will be completed June 16. BRIEF CASE HISTORY:      Josseline Jones  is a 46 y.o.  male with history of alcohol abuse, depression, COPD, who is admitted to the hospital for SOB. 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 Patient seen by ENT    he had neck LN biopsy and results  Was positive for cancer involvement   The decision was to proceed with definitive chemoradiation. Considering his multiple comorbidities and poor nutritional status, the decision was to offer him weekly cisplatin with radiation. Chemoradiation was started on 4/28/2021  INTERIM HISTORY:   The patient comes in today for a follow-up. He received chemoradiation. He handled the first dose very well. He had no nausea or vomiting, no fever or chills. He unfortunately continues to smoke and smokes about 1 pack/day. We talked about smoking cessation and he will try. I offered him the patches but he is not interested at this point. He has increased phlegm. He is able to swallow. He has some sore throat but it is mild.           Past Medical History:   has a past medical history of Alcohol abuse, Anemia, Anxiety, COPD (chronic obstructive pulmonary disease) ([facility] [address] Depression, Gastritis, Head injury with loss of consciousness ([facility] [address] Headache, Hypertension, Laryngeal cancer ([facility] [address] Liver disease, Marijuana use, Migraine, NSVT (nonsustained ventricular tachycardia) ([facility] [address] Oropharyngeal dysphagia, PUD (peptic ulcer disease), Seizure disorder ([facility] [address] and Wernicke-Korsakoff psychosis ([facility] [address] Past Surgical History:   has a past surgical history that includes Ankle surgery (age 10); Upper gastrointestinal endoscopy (N/A, 09/17/2019); US BIOPSY LYMPH NODE (03/16/2021); Endoscopy, colon, diagnostic; IR PORT PLACEMENT > 5 YEARS (04/06/2021); Ankle surgery (Right); and Upper gastrointestinal endoscopy (N/A, 5/19/2021). Medications:    Prior to Admission medications    Medication Sig Start Date End Date Taking? Authorizing Provider   prochlorperazine (COMPAZINE) 10 MG tablet Take 1 tablet by mouth every 6 hours as needed (nausea) 4/27/21  Yes Alon Sawyer MD   lidocaine-prilocaine (EMLA) 2.5-2.5 % cream Apply topically to port site 60 minutes before access as needed.  4/27/21  Yes Deisy Andre MD   ondansetron (ZOFRAN ODT) 8 MG TBDP disintegrating tablet Take 1 tablet by mouth every 8 hours as needed for Nausea 4/27/21  Yes Deisy Andre MD   omeprazole (PRILOSEC) 20 MG delayed release capsule Take 1 capsule by mouth daily 4/8/21  Yes Saumya Perdue MD   sucralfate (CARAFATE) 1 GM/10ML suspension Take 10 mLs by mouth 4 times daily 4/8/21  Yes Saumya Perdue MD   ibuprofen (IBU) 600 MG tablet Take 1 tablet by mouth every 6 hours as needed for Pain  Patient not taking: Reported on 3/31/2021 2/14/21 4/7/21  Marcelino Reyes DO   pantoprazole (PROTONIX) 40 MG tablet Take 1 tablet by mouth daily  Patient not taking: Reported on 3/31/2021 6/16/20 4/7/21  Juan Valverde MD     Current Outpatient Medications   Medication Sig Dispense Refill    prochlorperazine (COMPAZINE) 10 MG tablet Take 1 tablet by mouth every 6 hours as needed (nausea) 60 tablet 2    lidocaine-prilocaine (EMLA) 2.5-2.5 % cream Apply topically to port site 60 minutes before access as needed. 1 Tube 3    ondansetron (ZOFRAN ODT) 8 MG TBDP disintegrating tablet Take 1 tablet by mouth every 8 hours as needed for Nausea 30 tablet 2    omeprazole (PRILOSEC) 20 MG delayed release capsule Take 1 capsule by mouth daily 30 capsule 3    sucralfate (CARAFATE) 1 GM/10ML suspension Take 10 mLs by mouth 4 times daily 420 mL 3     No current facility-administered medications for this visit. Facility-Administered Medications Ordered in Other Visits   Medication Dose Route Frequency Provider Last Rate Last Admin    sodium chloride flush 0.9 % injection 10 mL  10 mL Intravenous PRN Delia Morse MD   10 mL at 05/26/21 0809    heparin flush 100 UNIT/ML injection 500 Units  500 Units Intracatheter PRN Rachel Arriaga MD           Allergies:  Patient has no known allergies. Social History:   reports that he has been smoking. He has been smoking about 2.00 packs per day. He has never used smokeless tobacco. He reports current alcohol use. He reports current drug use. Drug: Marijuana. Family History: family history includes Colon Cancer in his sister; Diabetes in his mother; High Blood Pressure in his mother; Kidney Disease in his mother. REVIEW OF SYSTEMS:    Constitutional: No fever or chills. No night sweats, + weight loss   Eyes: No eye discharge, double vision, or eye pain   HEENT: negative for sore mouth, he has sore  throat and mild dysphagia.   Increased phlegm  Respiratory: negative for cough , sputum, dyspnea, wheezing, hemoptysis, chest pain   Cardiovascular: negative for chest pain, dyspnea, palpitations, orthopnea, PND   Gastrointestinal: negative for nausea, vomiting, diarrhea, constipation, abdominal pain, Dysphagia, hematemesis and hematochezia   Genitourinary: negative for frequency, dysuria, nocturia, urinary incontinence, and hematuria Integument: negative for rash, skin lesions, bruises.    Hematologic/Lymphatic: negative for easy bruising, bleeding, lymphadenopathy, or petechiae   Endocrine: negative for heat or cold intolerance,weight changes, change in bowel habits and hair loss   Musculoskeletal: negative for myalgias, arthralgias, pain, joint swelling,and bone pain   Neurological: negative for headaches, dizziness, seizures, weakness, numbness    PHYSICAL EXAM:      /80 (Site: Left Upper Arm, Position: Sitting, Cuff Size: Medium Adult)   Pulse 110   Temp 98 °F (36.7 °C) (Temporal)   Resp 20   Ht 6' (1.829 m)   Wt 193 lb (87.5 kg)   BMI 26.18 kg/m²    Temp (24hrs), Av.7 °F (37.1 °C), Min:98.5 °F (36.9 °C), Max:98.8 °F (37.1 °C)    General appearance -chronically ill appearing, no in pain or distress   Mental status - alert and cooperative   Eyes - pupils equal and reactive, extraocular eye movements intact   Ears - bilateral TM's and external ear canals normal   Mouth - mucous membranes moist, pharynx normal without lesions   Neck - supple, no significant adenopathy   Lymphatics - ++neck palpable lymphadenopathy, no hepatosplenomegaly   Chest - clear to auscultation, no wheezes, rales or rhonchi, symmetric air entry   Heart - normal rate, regular rhythm, normal S1, S2, no murmurs  Abdomen - soft, nontender, nondistended, no masses or organomegaly   Neurological - alert, oriented, normal speech, no focal findings or movement disorder noted   Musculoskeletal - no joint tenderness, deformity or swelling   Extremities - peripheral pulses normal, no pedal edema, no clubbing or cyanosis   Skin - normal coloration and turgor, no rashes, no suspicious skin lesions noted ,    DATA:    Labs:   CBC:   Lab Results   Component Value Date    WBC 3.3 (L) 2021    HGB 11.8 (L) 2021    HCT 36.0 (L) 2021    MCV 98.4 2021     (L) 2021       Lab Results   Component Value Date     2021    K 3.6 (L) 05/26/2021    CL 98 05/26/2021    CO2 25 05/26/2021    BUN 4 (L) 05/26/2021    CREATININE 0.57 (L) 05/26/2021    GLUCOSE 140 (H) 05/26/2021    CALCIUM 9.5 05/26/2021    PROT 7.7 05/26/2021    LABALBU 3.9 05/26/2021    BILITOT 0.41 05/26/2021    ALKPHOS 102 05/26/2021    AST 33 05/26/2021    ALT 26 05/26/2021    LABGLOM >60 05/26/2021    GFRAA >60 05/26/2021    GLOB NOT REPORTED 03/16/2021         IMAGING DATA:     Impression   1.  Area of significant FDG uptake along the left side of the larynx   measuring 3.2 x 2.2 cm consistent with known laryngeal carcinoma.  Moderate   uptake within both submandibular glands.       2.  Multiple lymph nodes with abnormal FDG uptake in the left anterior   triangle of the neck extending from the larynx distally to the thoracic inlet   consistent with neoplasia.       3.  Thickened gastric mucosa in the fundus of the stomach with mild FDG   uptake likely related to inflammation. IMPRESSION:   1. Laryngeal squamous cell carcinoma, locally advanced with LN metastases, [id number].   2. Tobacco avuse  3. ETOH abuse  4. Undergoing chemoradiation    RECOMMENDATIONS:  1. I reviewed the laboratory data, imaging studies, biopsy results, diagnosis and treatment recommendations. 2. The patient handled the first week of chemoradiation fairly well  3. Labs were reviewed, so far he is tolerating treatment fairly well. No significant side effect  4. He started having increasing mouth sores due to radiation. I will prescribe Magic mouthwash swish and swallow. 5. We talked about smoking cessation. He will try, refused the Chantix or the patches  6. We will see him periodically through his treatment for toxicity evaluation management of side effects.                              [address] [facility]                            This note is created with the assistance of a speech recognition program.  While intending to generate a document that actually reflects the content of the visit, the document can still have some errors including those of syntax and sound a like substitutions which may escape proof reading. It such instances, actual meaning can be extrapolated by contextual diversion.

## 2021-06-02 ENCOUNTER — HOSPITAL ENCOUNTER (OUTPATIENT)
Dept: INFUSION THERAPY | Age: 51
Discharge: HOME | End: 2021-06-02
Payer: COMMERCIAL

## 2021-06-02 VITALS
DIASTOLIC BLOOD PRESSURE: 73 MMHG | RESPIRATION RATE: 18 BRPM | HEIGHT: 72 IN | TEMPERATURE: 98.5 F | WEIGHT: 190 LBS | SYSTOLIC BLOOD PRESSURE: 118 MMHG | BODY MASS INDEX: 25.73 KG/M2 | HEART RATE: 120 BPM

## 2021-06-02 DIAGNOSIS — Z45.2 ENCOUNTER FOR CARE RELATED TO VASCULAR ACCESS PORT: ICD-10-CM

## 2021-06-02 DIAGNOSIS — C32.9 LARYNGEAL CANCER (HCC): Primary | ICD-10-CM

## 2021-06-02 LAB
ABSOLUTE EOS #: 0.08 K/UL (ref 0–0.44)
ABSOLUTE IMMATURE GRANULOCYTE: 0 K/UL (ref 0–0.3)
ABSOLUTE LYMPH #: 0.39 K/UL (ref 1.1–3.7)
ABSOLUTE MONO #: 0.33 K/UL (ref 0.1–1.2)
ALBUMIN SERPL-MCNC: 4.2 G/DL (ref 3.5–5.2)
ALBUMIN/GLOBULIN RATIO: 1.1 (ref 1–2.5)
ALP BLD-CCNC: 99 U/L (ref 40–129)
ALT SERPL-CCNC: 28 U/L (ref 5–41)
ANION GAP SERPL CALCULATED.3IONS-SCNC: 13 MMOL/L (ref 9–17)
AST SERPL-CCNC: 44 U/L
BASOPHILS # BLD: 0 % (ref 0–2)
BASOPHILS ABSOLUTE: 0 K/UL (ref 0–0.2)
BILIRUB SERPL-MCNC: 0.45 MG/DL (ref 0.3–1.2)
BUN BLDV-MCNC: 6 MG/DL (ref 6–20)
BUN/CREAT BLD: 11 (ref 9–20)
CALCIUM SERPL-MCNC: 9.8 MG/DL (ref 8.6–10.4)
CHLORIDE BLD-SCNC: 99 MMOL/L (ref 98–107)
CO2: 25 MMOL/L (ref 20–31)
CREAT SERPL-MCNC: 0.57 MG/DL (ref 0.7–1.2)
DIFFERENTIAL TYPE: ABNORMAL
EOSINOPHILS RELATIVE PERCENT: 3 % (ref 1–4)
GFR AFRICAN AMERICAN: >60 ML/MIN
GFR NON-AFRICAN AMERICAN: >60 ML/MIN
GFR SERPL CREATININE-BSD FRML MDRD: ABNORMAL ML/MIN/{1.73_M2}
GFR SERPL CREATININE-BSD FRML MDRD: ABNORMAL ML/MIN/{1.73_M2}
GLUCOSE BLD-MCNC: 180 MG/DL (ref 70–99)
HCT VFR BLD CALC: 37.6 % (ref 40.7–50.3)
HEMOGLOBIN: 12.4 G/DL (ref 13–17)
IMMATURE GRANULOCYTES: 0 %
LYMPHOCYTES # BLD: 14 % (ref 24–43)
MAGNESIUM: 1.9 MG/DL (ref 1.6–2.6)
MCH RBC QN AUTO: 32 PG (ref 25.2–33.5)
MCHC RBC AUTO-ENTMCNC: 33 G/DL (ref 28.4–34.8)
MCV RBC AUTO: 97.2 FL (ref 82.6–102.9)
MONOCYTES # BLD: 12 % (ref 3–12)
MORPHOLOGY: NORMAL
NRBC AUTOMATED: 0 PER 100 WBC
NUCLEATED RED BLOOD CELLS: 1 PER 100 WBC (ref 0–5)
PDW BLD-RTO: 18.1 % (ref 11.8–14.4)
PLATELET # BLD: ABNORMAL K/UL (ref 138–453)
PLATELET ESTIMATE: ABNORMAL
PLATELET, FLUORESCENCE: 115 K/UL (ref 138–453)
PLATELET, IMMATURE FRACTION: 5 % (ref 1.1–10.3)
PMV BLD AUTO: ABNORMAL FL (ref 8.1–13.5)
POTASSIUM SERPL-SCNC: 3.8 MMOL/L (ref 3.7–5.3)
RBC # BLD: 3.87 M/UL (ref 4.21–5.77)
RBC # BLD: ABNORMAL 10*6/UL
SEG NEUTROPHILS: 71 % (ref 36–65)
SEGMENTED NEUTROPHILS ABSOLUTE COUNT: 1.97 K/UL (ref 1.5–8.1)
SODIUM BLD-SCNC: 137 MMOL/L (ref 135–144)
TOTAL PROTEIN: 8 G/DL (ref 6.4–8.3)
WBC # BLD: 2.8 K/UL (ref 3.5–11.3)
WBC # BLD: ABNORMAL 10*3/UL

## 2021-06-02 PROCEDURE — 96375 TX/PRO/DX INJ NEW DRUG ADDON: CPT

## 2021-06-02 PROCEDURE — 96413 CHEMO IV INFUSION 1 HR: CPT

## 2021-06-02 PROCEDURE — 96361 HYDRATE IV INFUSION ADD-ON: CPT

## 2021-06-02 PROCEDURE — 36591 DRAW BLOOD OFF VENOUS DEVICE: CPT

## 2021-06-02 PROCEDURE — 85055 RETICULATED PLATELET ASSAY: CPT

## 2021-06-02 PROCEDURE — 2580000003 HC RX 258: Performed by: INTERNAL MEDICINE

## 2021-06-02 PROCEDURE — 96366 THER/PROPH/DIAG IV INF ADDON: CPT

## 2021-06-02 PROCEDURE — 83735 ASSAY OF MAGNESIUM: CPT

## 2021-06-02 PROCEDURE — 85025 COMPLETE CBC W/AUTO DIFF WBC: CPT

## 2021-06-02 PROCEDURE — 96367 TX/PROPH/DG ADDL SEQ IV INF: CPT

## 2021-06-02 PROCEDURE — 80053 COMPREHEN METABOLIC PANEL: CPT

## 2021-06-02 PROCEDURE — 6360000002 HC RX W HCPCS: Performed by: INTERNAL MEDICINE

## 2021-06-02 RX ORDER — PALONOSETRON 0.05 MG/ML
0.25 INJECTION, SOLUTION INTRAVENOUS ONCE
Status: COMPLETED | OUTPATIENT
Start: 2021-06-02 | End: 2021-06-02

## 2021-06-02 RX ORDER — SODIUM CHLORIDE 0.9 % (FLUSH) 0.9 %
10 SYRINGE (ML) INJECTION PRN
Status: DISCONTINUED | OUTPATIENT
Start: 2021-06-02 | End: 2021-06-03 | Stop reason: HOSPADM

## 2021-06-02 RX ORDER — HEPARIN SODIUM (PORCINE) LOCK FLUSH IV SOLN 100 UNIT/ML 100 UNIT/ML
500 SOLUTION INTRAVENOUS PRN
Status: DISCONTINUED | OUTPATIENT
Start: 2021-06-02 | End: 2021-06-03 | Stop reason: HOSPADM

## 2021-06-02 RX ORDER — SODIUM CHLORIDE 9 MG/ML
20 INJECTION, SOLUTION INTRAVENOUS ONCE
Status: DISCONTINUED | OUTPATIENT
Start: 2021-06-02 | End: 2021-06-03 | Stop reason: HOSPADM

## 2021-06-02 RX ADMIN — FOSAPREPITANT 150 MG: 150 INJECTION, POWDER, LYOPHILIZED, FOR SOLUTION INTRAVENOUS at 09:54

## 2021-06-02 RX ADMIN — POTASSIUM CHLORIDE: 2 INJECTION, SOLUTION, CONCENTRATE INTRAVENOUS at 10:16

## 2021-06-02 RX ADMIN — POTASSIUM CHLORIDE: 2 INJECTION, SOLUTION, CONCENTRATE INTRAVENOUS at 12:25

## 2021-06-02 RX ADMIN — DEXAMETHASONE SODIUM PHOSPHATE 12 MG: 4 INJECTION, SOLUTION INTRAMUSCULAR; INTRAVENOUS at 09:42

## 2021-06-02 RX ADMIN — Medication 10 ML: at 08:01

## 2021-06-02 RX ADMIN — SODIUM CHLORIDE 20 ML/HR: 9 INJECTION, SOLUTION INTRAVENOUS at 09:32

## 2021-06-02 RX ADMIN — CISPLATIN: 1 INJECTION INTRAVENOUS at 11:18

## 2021-06-02 RX ADMIN — Medication 10 ML: at 13:29

## 2021-06-02 RX ADMIN — Medication 10 ML: at 08:02

## 2021-06-02 RX ADMIN — PALONOSETRON 0.25 MG: 0.05 INJECTION, SOLUTION INTRAVENOUS at 09:34

## 2021-06-02 RX ADMIN — Medication 10 ML: at 13:28

## 2021-06-02 RX ADMIN — HEPARIN 500 UNITS: 100 SYRINGE at 13:28

## 2021-06-02 ASSESSMENT — PAIN DESCRIPTION - PROGRESSION: CLINICAL_PROGRESSION: NOT CHANGED

## 2021-06-02 ASSESSMENT — PAIN DESCRIPTION - ONSET: ONSET: ON-GOING

## 2021-06-02 ASSESSMENT — PAIN DESCRIPTION - PAIN TYPE: TYPE: CHRONIC PAIN

## 2021-06-02 ASSESSMENT — PAIN - FUNCTIONAL ASSESSMENT: PAIN_FUNCTIONAL_ASSESSMENT: PREVENTS OR INTERFERES SOME ACTIVE ACTIVITIES AND ADLS

## 2021-06-02 ASSESSMENT — PAIN DESCRIPTION - DESCRIPTORS: DESCRIPTORS: NUMBNESS;ACHING

## 2021-06-02 ASSESSMENT — PAIN DESCRIPTION - FREQUENCY: FREQUENCY: INTERMITTENT

## 2021-06-02 ASSESSMENT — PAIN DESCRIPTION - ORIENTATION: ORIENTATION: RIGHT;LEFT

## 2021-06-02 ASSESSMENT — PAIN SCALES - GENERAL: PAINLEVEL_OUTOF10: 5

## 2021-06-02 ASSESSMENT — PAIN DESCRIPTION - LOCATION: LOCATION: KNEE;FOOT

## 2021-06-02 NOTE — PLAN OF CARE
Problem: Pain:  Goal: Pain level will decrease  Description: Pain level will decrease  Outcome: Met This Shift  Goal: Control of acute pain  Description: Control of acute pain  Outcome: Met This Shift  Goal: Control of chronic pain  Description: Control of chronic pain  Outcome: Met This Shift     Problem: KNOWLEDGE DEFICIT  Goal: Patient/S.O. demonstrates understanding of disease process, treatment plan, medications, and discharge instructions.   Outcome: Met This Shift     Problem: Infection - Central Venous Catheter-Associated Bloodstream Infection:  Goal: Will show no infection signs and symptoms  Description: Will show no infection signs and symptoms  Outcome: Met This Shift

## 2021-06-09 ENCOUNTER — OFFICE VISIT (OUTPATIENT)
Dept: ONCOLOGY | Age: 51
End: 2021-06-09
Payer: COMMERCIAL

## 2021-06-09 ENCOUNTER — HOSPITAL ENCOUNTER (OUTPATIENT)
Dept: INFUSION THERAPY | Age: 51
Discharge: HOME | End: 2021-06-09
Payer: COMMERCIAL

## 2021-06-09 VITALS
RESPIRATION RATE: 20 BRPM | TEMPERATURE: 97.5 F | SYSTOLIC BLOOD PRESSURE: 124 MMHG | DIASTOLIC BLOOD PRESSURE: 84 MMHG | BODY MASS INDEX: 25.63 KG/M2 | HEART RATE: 111 BPM | WEIGHT: 189 LBS

## 2021-06-09 VITALS
RESPIRATION RATE: 20 BRPM | SYSTOLIC BLOOD PRESSURE: 124 MMHG | WEIGHT: 189 LBS | HEART RATE: 111 BPM | DIASTOLIC BLOOD PRESSURE: 84 MMHG | BODY MASS INDEX: 25.63 KG/M2 | TEMPERATURE: 97.5 F

## 2021-06-09 DIAGNOSIS — C32.9 LARYNGEAL CANCER (HCC): Primary | ICD-10-CM

## 2021-06-09 DIAGNOSIS — Z45.2 ENCOUNTER FOR CARE RELATED TO VASCULAR ACCESS PORT: ICD-10-CM

## 2021-06-09 LAB
ABSOLUTE EOS #: 0.05 K/UL (ref 0–0.44)
ABSOLUTE IMMATURE GRANULOCYTE: 0 K/UL (ref 0–0.3)
ABSOLUTE LYMPH #: 0.28 K/UL (ref 1.1–3.7)
ABSOLUTE MONO #: 0.3 K/UL (ref 0.1–1.2)
ALBUMIN SERPL-MCNC: 4 G/DL (ref 3.5–5.2)
ALBUMIN/GLOBULIN RATIO: 1.1 (ref 1–2.5)
ALP BLD-CCNC: 99 U/L (ref 40–129)
ALT SERPL-CCNC: 23 U/L (ref 5–41)
ANION GAP SERPL CALCULATED.3IONS-SCNC: 14 MMOL/L (ref 9–17)
AST SERPL-CCNC: 29 U/L
BASOPHILS # BLD: 0 % (ref 0–2)
BASOPHILS ABSOLUTE: 0 K/UL (ref 0–0.2)
BILIRUB SERPL-MCNC: 0.4 MG/DL (ref 0.3–1.2)
BUN BLDV-MCNC: 3 MG/DL (ref 6–20)
BUN/CREAT BLD: 7 (ref 9–20)
CALCIUM SERPL-MCNC: 9.9 MG/DL (ref 8.6–10.4)
CHLORIDE BLD-SCNC: 100 MMOL/L (ref 98–107)
CO2: 25 MMOL/L (ref 20–31)
CREAT SERPL-MCNC: 0.46 MG/DL (ref 0.7–1.2)
DIFFERENTIAL TYPE: ABNORMAL
EOSINOPHILS RELATIVE PERCENT: 2 % (ref 1–4)
GFR AFRICAN AMERICAN: >60 ML/MIN
GFR NON-AFRICAN AMERICAN: >60 ML/MIN
GFR SERPL CREATININE-BSD FRML MDRD: ABNORMAL ML/MIN/{1.73_M2}
GFR SERPL CREATININE-BSD FRML MDRD: ABNORMAL ML/MIN/{1.73_M2}
GLUCOSE BLD-MCNC: 105 MG/DL (ref 70–99)
HCT VFR BLD CALC: 33.1 % (ref 40.7–50.3)
HEMOGLOBIN: 11.3 G/DL (ref 13–17)
IMMATURE GRANULOCYTES: 0 %
LYMPHOCYTES # BLD: 11 % (ref 24–43)
MAGNESIUM: 1.9 MG/DL (ref 1.6–2.6)
MCH RBC QN AUTO: 32.9 PG (ref 25.2–33.5)
MCHC RBC AUTO-ENTMCNC: 34.1 G/DL (ref 28.4–34.8)
MCV RBC AUTO: 96.5 FL (ref 82.6–102.9)
MONOCYTES # BLD: 12 % (ref 3–12)
MORPHOLOGY: ABNORMAL
NRBC AUTOMATED: 0 PER 100 WBC
PDW BLD-RTO: 18.6 % (ref 11.8–14.4)
PLATELET # BLD: ABNORMAL K/UL (ref 138–453)
PLATELET ESTIMATE: ABNORMAL
PLATELET, FLUORESCENCE: 93 K/UL (ref 138–453)
PLATELET, IMMATURE FRACTION: 5.7 % (ref 1.1–10.3)
PMV BLD AUTO: ABNORMAL FL (ref 8.1–13.5)
POTASSIUM SERPL-SCNC: 3.5 MMOL/L (ref 3.7–5.3)
RBC # BLD: 3.43 M/UL (ref 4.21–5.77)
RBC # BLD: ABNORMAL 10*6/UL
SEG NEUTROPHILS: 75 % (ref 36–65)
SEGMENTED NEUTROPHILS ABSOLUTE COUNT: 1.87 K/UL (ref 1.5–8.1)
SODIUM BLD-SCNC: 139 MMOL/L (ref 135–144)
TOTAL PROTEIN: 7.7 G/DL (ref 6.4–8.3)
WBC # BLD: 2.5 K/UL (ref 3.5–11.3)
WBC # BLD: ABNORMAL 10*3/UL

## 2021-06-09 PROCEDURE — 85025 COMPLETE CBC W/AUTO DIFF WBC: CPT

## 2021-06-09 PROCEDURE — G8427 DOCREV CUR MEDS BY ELIG CLIN: HCPCS | Performed by: INTERNAL MEDICINE

## 2021-06-09 PROCEDURE — 36415 COLL VENOUS BLD VENIPUNCTURE: CPT

## 2021-06-09 PROCEDURE — 6370000000 HC RX 637 (ALT 250 FOR IP): Performed by: INTERNAL MEDICINE

## 2021-06-09 PROCEDURE — G8419 CALC BMI OUT NRM PARAM NOF/U: HCPCS | Performed by: INTERNAL MEDICINE

## 2021-06-09 PROCEDURE — 36591 DRAW BLOOD OFF VENOUS DEVICE: CPT

## 2021-06-09 PROCEDURE — 96413 CHEMO IV INFUSION 1 HR: CPT

## 2021-06-09 PROCEDURE — 83735 ASSAY OF MAGNESIUM: CPT

## 2021-06-09 PROCEDURE — 80053 COMPREHEN METABOLIC PANEL: CPT

## 2021-06-09 PROCEDURE — 85055 RETICULATED PLATELET ASSAY: CPT

## 2021-06-09 PROCEDURE — 99214 OFFICE O/P EST MOD 30 MIN: CPT | Performed by: INTERNAL MEDICINE

## 2021-06-09 PROCEDURE — 96367 TX/PROPH/DG ADDL SEQ IV INF: CPT

## 2021-06-09 PROCEDURE — 96361 HYDRATE IV INFUSION ADD-ON: CPT

## 2021-06-09 PROCEDURE — 2580000003 HC RX 258: Performed by: INTERNAL MEDICINE

## 2021-06-09 PROCEDURE — 96375 TX/PRO/DX INJ NEW DRUG ADDON: CPT

## 2021-06-09 PROCEDURE — 6360000002 HC RX W HCPCS: Performed by: INTERNAL MEDICINE

## 2021-06-09 PROCEDURE — 4004F PT TOBACCO SCREEN RCVD TLK: CPT | Performed by: INTERNAL MEDICINE

## 2021-06-09 PROCEDURE — 3017F COLORECTAL CA SCREEN DOC REV: CPT | Performed by: INTERNAL MEDICINE

## 2021-06-09 RX ORDER — PALONOSETRON 0.05 MG/ML
0.25 INJECTION, SOLUTION INTRAVENOUS ONCE
Status: COMPLETED | OUTPATIENT
Start: 2021-06-09 | End: 2021-06-09

## 2021-06-09 RX ORDER — HEPARIN SODIUM (PORCINE) LOCK FLUSH IV SOLN 100 UNIT/ML 100 UNIT/ML
500 SOLUTION INTRAVENOUS PRN
Status: DISCONTINUED | OUTPATIENT
Start: 2021-06-09 | End: 2021-06-10 | Stop reason: HOSPADM

## 2021-06-09 RX ORDER — HEPARIN SODIUM (PORCINE) LOCK FLUSH IV SOLN 100 UNIT/ML 100 UNIT/ML
500 SOLUTION INTRAVENOUS PRN
Status: CANCELLED | OUTPATIENT
Start: 2021-06-09

## 2021-06-09 RX ORDER — SODIUM CHLORIDE 0.9 % (FLUSH) 0.9 %
5-40 SYRINGE (ML) INJECTION PRN
Status: DISCONTINUED | OUTPATIENT
Start: 2021-06-09 | End: 2021-06-10 | Stop reason: HOSPADM

## 2021-06-09 RX ORDER — SODIUM CHLORIDE 9 MG/ML
25 INJECTION, SOLUTION INTRAVENOUS PRN
Status: CANCELLED | OUTPATIENT
Start: 2021-06-09

## 2021-06-09 RX ORDER — SODIUM CHLORIDE 9 MG/ML
20 INJECTION, SOLUTION INTRAVENOUS ONCE
Status: COMPLETED | OUTPATIENT
Start: 2021-06-09 | End: 2021-06-09

## 2021-06-09 RX ORDER — SODIUM CHLORIDE 0.9 % (FLUSH) 0.9 %
5-40 SYRINGE (ML) INJECTION PRN
Status: CANCELLED | OUTPATIENT
Start: 2021-06-09

## 2021-06-09 RX ORDER — POTASSIUM CHLORIDE 750 MG/1
20 TABLET, EXTENDED RELEASE ORAL
Status: COMPLETED | OUTPATIENT
Start: 2021-06-09 | End: 2021-06-09

## 2021-06-09 RX ADMIN — Medication 500 UNITS: at 14:19

## 2021-06-09 RX ADMIN — POTASSIUM CHLORIDE 20 MEQ: 750 TABLET, EXTENDED RELEASE ORAL at 10:58

## 2021-06-09 RX ADMIN — FOSAPREPITANT 150 MG: 150 INJECTION, POWDER, LYOPHILIZED, FOR SOLUTION INTRAVENOUS at 10:19

## 2021-06-09 RX ADMIN — POTASSIUM CHLORIDE 20 MEQ: 750 TABLET, EXTENDED RELEASE ORAL at 09:37

## 2021-06-09 RX ADMIN — SODIUM CHLORIDE 20 ML/HR: 9 INJECTION, SOLUTION INTRAVENOUS at 08:10

## 2021-06-09 RX ADMIN — CISPLATIN: 1 INJECTION INTRAVENOUS at 12:06

## 2021-06-09 RX ADMIN — DEXAMETHASONE SODIUM PHOSPHATE 12 MG: 4 INJECTION, SOLUTION INTRAMUSCULAR; INTRAVENOUS at 09:53

## 2021-06-09 RX ADMIN — SODIUM CHLORIDE, PRESERVATIVE FREE 10 ML: 5 INJECTION INTRAVENOUS at 08:11

## 2021-06-09 RX ADMIN — POTASSIUM CHLORIDE: 2 INJECTION, SOLUTION, CONCENTRATE INTRAVENOUS at 10:59

## 2021-06-09 RX ADMIN — POTASSIUM CHLORIDE: 2 INJECTION, SOLUTION, CONCENTRATE INTRAVENOUS at 13:13

## 2021-06-09 RX ADMIN — SODIUM CHLORIDE, PRESERVATIVE FREE 10 ML: 5 INJECTION INTRAVENOUS at 14:19

## 2021-06-09 RX ADMIN — SODIUM CHLORIDE, PRESERVATIVE FREE 10 ML: 5 INJECTION INTRAVENOUS at 08:10

## 2021-06-09 RX ADMIN — PALONOSETRON 0.25 MG: 0.05 INJECTION, SOLUTION INTRAVENOUS at 10:57

## 2021-06-09 ASSESSMENT — PAIN DESCRIPTION - LOCATION: LOCATION: GENERALIZED

## 2021-06-09 ASSESSMENT — PAIN SCALES - GENERAL: PAINLEVEL_OUTOF10: 6

## 2021-06-09 NOTE — PROGRESS NOTES
Pete Letters here for treatment and md visit   Toxicity assessment completed   Rates pain at 6, generalized pain all over   Port accessed per policy, good blood return, labs drawn   labs reviewed, K 3.5 oral Kcl 20meq x2 doses given 1hr apart   Pre meds given as ordered see MAR   Pre hydration given see MAR   Hydration completed, IV Cisplatin 64mg started over 1hr   See Mar  Pt tolerated well, IV post hydration given over 60min as ordered   Hydration completed and pt discharged to Rad tx   Last week of tx as RT finishes next Wed  RV appts given

## 2021-06-09 NOTE — PROGRESS NOTES
Chief Complaint   Patient presents with    Cancer     Follow up laryngeal cancer, patient states no appetite and hurting all over. DIAGNOSIS:       1.   Laryngeal squamous cell carcinoma, locally advanced with LN metastases, [id number]   2. Tobacco avuse  3. ETOH abuse  CURRENT THERAPY:         Plan for combined chemoradiation after completing staging work-up  Chemoradiation was started on 4/28/2021. Radiation will be completed June 16. Last chemotherapy treatment June 9, 2021  BRIEF CASE HISTORY:      Leena Norman  is a 46 y.o.  male with history of alcohol abuse, depression, COPD, who is admitted to the hospital for SOB. 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 Patient seen by ENT    he had neck LN biopsy and results  Was positive for cancer involvement   The decision was to proceed with definitive chemoradiation. Considering his multiple comorbidities and poor nutritional status, the decision was to offer him weekly cisplatin with radiation. Chemoradiation was started on 4/28/2021  INTERIM HISTORY:   The patient comes in today for a follow-up. He received chemoradiation. He handled the chemotherapy very well. He had no nausea or vomiting, no fever or chills. He unfortunately continues to smoke and smokes about 1 pack/day. We talked about smoking cessation and he will try. I offered him the patches but he is not interested at this point. He has increased phlegm. He is able to swallow. He has some sore throat but it is mild. While using Magic mouthwash. Is using outside cream for the skin burning.           Past Medical History:   has a past medical history of Alcohol abuse, Anemia, Anxiety, COPD (chronic obstructive pulmonary disease) ([address] Depression, Gastritis, Head injury with loss of consciousness ([address] Headache, Hypertension, Laryngeal cancer ([address] Liver disease, Marijuana use, Migraine, NSVT (nonsustained ventricular tachycardia) ([facility]), Oropharyngeal dysphagia, PUD (peptic ulcer disease), Seizure disorder ([facility] [address] and Wernicke-Korsakoff psychosis ([facility] [address] Past Surgical History:   has a past surgical history that includes Ankle surgery (age 10); Upper gastrointestinal endoscopy (N/A, 09/17/2019); US BIOPSY LYMPH NODE (03/16/2021); Endoscopy, colon, diagnostic; IR PORT PLACEMENT > 5 YEARS (04/06/2021); Ankle surgery (Right); and Upper gastrointestinal endoscopy (N/A, 5/19/2021). Medications:    Prior to Admission medications    Medication Sig Start Date End Date Taking? Authorizing Provider   prochlorperazine (COMPAZINE) 10 MG tablet Take 1 tablet by mouth every 6 hours as needed (nausea) 4/27/21  Yes Harriett Sawyer MD   ondansetron (ZOFRAN ODT) 8 MG TBDP disintegrating tablet Take 1 tablet by mouth every 8 hours as needed for Nausea 4/27/21  Yes Valeria Duke MD   omeprazole (PRILOSEC) 20 MG delayed release capsule Take 1 capsule by mouth daily 4/8/21  Yes Luciana Irving MD   sucralfate (CARAFATE) 1 GM/10ML suspension Take 10 mLs by mouth 4 times daily 4/8/21  Yes Luciana Irving MD   Magic Mouthwash (MIRACLE MOUTHWASH) Mix 80 ml maalox, 80 ml benadryl and 40 ml 2%viscous xylocaine. 5-10 ml swish and swallow q 4 hours PRN  Patient not taking: Reported on 6/9/2021 5/26/21   Harriett Sawyer MD   lidocaine-prilocaine (EMLA) 2.5-2.5 % cream Apply topically to port site 60 minutes before access as needed.   Patient not taking: Reported on 6/9/2021 4/27/21   Valeria Duke MD   ibuprofen (IBU) 600 MG tablet Take 1 tablet by mouth every 6 hours as needed for Pain  Patient not taking: Reported on 3/31/2021 2/14/21 4/7/21  Roscoe American Gruenbaum, DO   pantoprazole (PROTONIX) 40 MG tablet Take 1 tablet by mouth daily  Patient not taking: Reported on 3/31/2021 6/16/20 4/7/21  Janette Padilla MD     Current Outpatient Medications   Medication Sig Dispense Refill    prochlorperazine (COMPAZINE) 10 MG tablet Take 1 tablet by mouth every 6 hours as needed (nausea) 60 tablet 2    ondansetron (ZOFRAN ODT) 8 MG TBDP disintegrating tablet Take 1 tablet by mouth every 8 hours as needed for Nausea 30 tablet 2    omeprazole (PRILOSEC) 20 MG delayed release capsule Take 1 capsule by mouth daily 30 capsule 3    sucralfate (CARAFATE) 1 GM/10ML suspension Take 10 mLs by mouth 4 times daily 420 mL 3    Magic Mouthwash (MIRACLE MOUTHWASH) Mix 80 ml maalox, 80 ml benadryl and 40 ml 2%viscous xylocaine. 5-10 ml swish and swallow q 4 hours PRN (Patient not taking: Reported on 6/9/2021) 1 Bottle 2    lidocaine-prilocaine (EMLA) 2.5-2.5 % cream Apply topically to port site 60 minutes before access as needed. (Patient not taking: Reported on 6/9/2021) 1 Tube 3     No current facility-administered medications for this visit. Facility-Administered Medications Ordered in Other Visits   Medication Dose Route Frequency Provider Last Rate Last Admin    sodium chloride flush 0.9 % injection 5-40 mL  5-40 mL Intravenous PRN Patience Sawyer MD   10 mL at 06/09/21 0811    heparin flush 100 UNIT/ML injection 500 Units  500 Units Intracatheter PRN Bryan Bay MD        potassium chloride (KLOR-CON M) extended release tablet 20 mEq  20 mEq Oral Q1H Bryan Bay MD           Allergies:  Patient has no known allergies. Social History:   reports that he has been smoking. He has been smoking about 2.00 packs per day. He has never used smokeless tobacco. He reports current alcohol use. He reports current drug use. Drug: Marijuana. Family History: family history includes Colon Cancer in his sister; Diabetes in his mother; High Blood Pressure in his mother; Kidney Disease in his mother. REVIEW OF SYSTEMS:    Constitutional: No fever or chills.  No night sweats, + weight loss   Eyes: No eye discharge, double vision, or eye pain   HEENT: negative for sore mouth, he has sore  throat and mild dysphagia. Increased phlegm  Respiratory: negative for cough , sputum, dyspnea, wheezing, hemoptysis, chest pain   Cardiovascular: negative for chest pain, dyspnea, palpitations, orthopnea, PND   Gastrointestinal: negative for nausea, vomiting, diarrhea, constipation, abdominal pain, Dysphagia, hematemesis and hematochezia   Genitourinary: negative for frequency, dysuria, nocturia, urinary incontinence, and hematuria   Integument: negative for rash, skin lesions, bruises.    Hematologic/Lymphatic: negative for easy bruising, bleeding, lymphadenopathy, or petechiae   Endocrine: negative for heat or cold intolerance,weight changes, change in bowel habits and hair loss   Musculoskeletal: negative for myalgias, arthralgias, pain, joint swelling,and bone pain   Neurological: negative for headaches, dizziness, seizures, weakness, numbness    PHYSICAL EXAM:      /84   Pulse 111   Temp 97.5 °F (36.4 °C)   Resp 20   Wt 189 lb (85.7 kg)   BMI 25.63 kg/m²    Temp (24hrs), Av.7 °F (37.1 °C), Min:98.5 °F (36.9 °C), Max:98.8 °F (37.1 °C)    General appearance -chronically ill appearing, no in pain or distress   Mental status - alert and cooperative   Eyes - pupils equal and reactive, extraocular eye movements intact   Ears - bilateral TM's and external ear canals normal   Mouth - mucous membranes moist, pharynx normal without lesions   Neck - supple, no significant adenopathy   Lymphatics - ++neck palpable lymphadenopathy, no hepatosplenomegaly   Chest - clear to auscultation, no wheezes, rales or rhonchi, symmetric air entry   Heart - normal rate, regular rhythm, normal S1, S2, no murmurs  Abdomen - soft, nontender, nondistended, no masses or organomegaly   Neurological - alert, oriented, normal speech, no focal findings or movement disorder noted   Musculoskeletal - no joint tenderness, deformity or swelling   Extremities - peripheral pulses normal, no pedal edema, no clubbing or cyanosis Skin - normal coloration and turgor, no rashes, no suspicious skin lesions noted ,    DATA:    Labs:   CBC:   Lab Results   Component Value Date    WBC 2.5 (L) 06/09/2021    HGB 11.3 (L) 06/09/2021    HCT 33.1 (L) 06/09/2021    MCV 96.5 06/09/2021    PLT See Reflexed IPF Result 06/09/2021       Lab Results   Component Value Date     06/09/2021    K 3.5 (L) 06/09/2021     06/09/2021    CO2 25 06/09/2021    BUN 3 (L) 06/09/2021    CREATININE 0.46 (L) 06/09/2021    GLUCOSE 105 (H) 06/09/2021    CALCIUM 9.9 06/09/2021    PROT 7.7 06/09/2021    LABALBU 4.0 06/09/2021    BILITOT 0.40 06/09/2021    ALKPHOS 99 06/09/2021    AST 29 06/09/2021    ALT 23 06/09/2021    LABGLOM >60 06/09/2021    GFRAA >60 06/09/2021    GLOB NOT REPORTED 03/16/2021         IMAGING DATA:     Impression   1.  Area of significant FDG uptake along the left side of the larynx   measuring 3.2 x 2.2 cm consistent with known laryngeal carcinoma.  Moderate   uptake within both submandibular glands.       2.  Multiple lymph nodes with abnormal FDG uptake in the left anterior   triangle of the neck extending from the larynx distally to the thoracic inlet   consistent with neoplasia.       3.  Thickened gastric mucosa in the fundus of the stomach with mild FDG   uptake likely related to inflammation. IMPRESSION:   1. Laryngeal squamous cell carcinoma, locally advanced with LN metastases, [id number].   2. Tobacco avuse  3. ETOH abuse  4. Undergoing chemoradiation    RECOMMENDATIONS:  1. I reviewed the laboratory data, imaging studies, biopsy results, diagnosis and treatment recommendations. 2. The patient handled the chemoradiation fairly well with fewer side effects as expected. He will continue Magic mouthwash. We will continue to use skin ointments. 3. Labs were reviewed, stable labs and adequate to go ahead with treatment. 4. This will be the last chemotherapy treatment.   Patient will continue radiation therapy which will be completed June 16, 2021.  5. We will assess the response to treatment with repeated CT scan about 4 to 5 weeks. PET CT scan will be done in about 3 months later. 6. We talked about smoking cessation. He will try, refused the Chantix or the patches  7. Patient's questions were answered to the best of his satisfaction and he verbalized full understanding and agreement. 6 [facility]                            This note is created with the assistance of a speech recognition program.  While intending to generate a document that actually reflects the content of the visit, the document can still have some errors including those of syntax and sound a like substitutions which may escape proof reading. It such instances, actual meaning can be extrapolated by contextual diversion.

## 2021-06-16 NOTE — PROGRESS NOTES
GI CLINIC FOLLOW UP    INTERVAL HISTORY:   No referring provider defined for this encounter. Chief Complaint   Patient presents with    Follow-up     Patient is f/u on EGD. HISTORY OF PRESENT ILLNESS: Momo Garza is a 46 y.o. male , referred for evaluation of*squamous cell carcinoma of the neck, needs for PEG, history of peptic ulcer disease and GI bleed, heavy alcohol use and history of alcoholic liver disease, marijuana smoking   Charli Lafleur is here for follow-up he does have history of squamous cell carcinoma of the neck  Treated with chemotherapy and radiation  Chemo done    3 more days radiation   Last visit we were going to put a PEG for him but on the day of the EGD he did refuse to have the PEG   He said has been eating okay and he has been taking Ensure and boost but distended loose 10 lb    better now after the chemo was finished and he has only 3 days left for radiation  He did have some gastric ulcers and gastritis in the past  Is denying any abdominal pain denied any bleeding denied any nausea or vomiting    . no more ETOH  Labs 6/9/20201:  Normal LFTs  Pancytopenic       Findings:     Retropharyngeal area Throat lesion most likely where the patient's tumor is in the left side in the piriform sinus       Esophagus: normal     Stomach:    Fundus: abnormal: Gastritis biopsies were taken     Body: abnormal: Gastritis biopsies were taken     Antrum: abnormal: Gastritis biopsies were taken      Duodenum:     Descending: normal    Bulb: normal     The scope was removed and the patient tolerated the procedure well.      Recommendations/Plan:   1. F/U Biopsies  2. F/U In Office in 3-4 weeks  3.  Discussed with the family     Electronically signed by Ozzie Werner MD  on 5/19/2021 at 12:34 PM    Final Diagnosis   STOMACH, BIOPSY:          GASTRIC ANTRAL MUCOSA WITH MILD INACTIVE CHRONIC GASTRITIS AND   FOCAL REACTIVE GASTROPATHY CHANGE     NEGATIVE FOR INTESTINAL METAPLASIA, DYSPLASIA OR MALIGNANCY     NO EVIDENCE FOR H. PYLORI MICROORGANISMS IN H&E STAINED MATERIAL       Mateus Bond M.D.   **Electronically Signed Out**         clj/5/20/2021       Past Medical,Family, and Social History reviewed and does contribute to the patient presentingcondition. Patient's PMH/PSH,SH,PSYCH Hx, MEDs, ALLERGIES, and ROS were all reviewed and updated in the appropriate sections. PAST MEDICAL HISTORY:  Past Medical History:   Diagnosis Date    Alcohol abuse     Anemia     Anxiety     COPD (chronic obstructive pulmonary disease) ([address]     Depression     Gastritis 09/24/2019    Mild chronic inactive gastritis    Head injury with loss of consciousness ([address]     Headache     Hypertension     Laryngeal cancer ([address]     W/metastasis to cervical lymph node, currently receiving chemo/radiation    Liver disease     Marijuana use     Migraine     NSVT (nonsustained ventricular tachycardia) ([address] 09/13/2019    Oropharyngeal dysphagia 12/13/2019    PUD (peptic ulcer disease)     Seizure disorder ([address] 09/13/2019    Wernicke-Korsakoff psychosis ([address] 11/08/2014       Past Surgical History:   Procedure Laterality Date    ANKLE SURGERY  age 10    left    ANKLE SURGERY Right     Plates and screws    ENDOSCOPY, COLON, DIAGNOSTIC      IR PORT PLACEMENT EQUAL OR GREATER THAN 5 YEARS  04/06/2021    IR PORT PLACEMENT EQUAL OR GREATER THAN 5 YEARS 4/6/2021 [facility] SPECIAL PROCEDURES    UPPER GASTROINTESTINAL ENDOSCOPY N/A 09/17/2019    EGD BIOPSY performed by Brendan Pérez MD at [address] [place] [address] N/A 5/19/2021    EGD BIOPSY performed by Laurie aPiz MD at [address] [place] [address]  03/16/2021    US LYMPH NODE BIOPSY 3/16/2021 [facility]       CURRENT MEDICATIONS:    Current Outpatient Medications:     Magic Mouthwash (MIRACLE MOUTHWASH), Mix 80 ml maalox, 80 ml benadryl and 40 ml 2%viscous xylocaine.  5-10 ml swish and swallow q 4 hours PRN, Disp: 1 Bottle, Rfl: 2    prochlorperazine (COMPAZINE) 10 MG tablet, Take 1 tablet by mouth every 6 hours as needed (nausea), Disp: 60 tablet, Rfl: 2    lidocaine-prilocaine (EMLA) 2.5-2.5 % cream, Apply topically to port site 60 minutes before access as needed. , Disp: 1 Tube, Rfl: 3    ondansetron (ZOFRAN ODT) 8 MG TBDP disintegrating tablet, Take 1 tablet by mouth every 8 hours as needed for Nausea, Disp: 30 tablet, Rfl: 2    omeprazole (PRILOSEC) 20 MG delayed release capsule, Take 1 capsule by mouth daily, Disp: 30 capsule, Rfl: 3    sucralfate (CARAFATE) 1 GM/10ML suspension, Take 10 mLs by mouth 4 times daily, Disp: 420 mL, Rfl: 3    ALLERGIES:   No Known Allergies    FAMILY HISTORY:       Problem Relation Age of Onset    Kidney Disease Mother     High Blood Pressure Mother     Diabetes Mother     Colon Cancer Sister          SOCIAL HISTORY:   Social History     Socioeconomic History    Marital status: Single     Spouse name: Not on file    Number of children: Not on file    Years of education: Not on file    Highest education level: Not on file   Occupational History    Not on file   Tobacco Use    Smoking status: Current Every Day Smoker     Packs/day: 2.00    Smokeless tobacco: Never Used   Vaping Use    Vaping Use: Never used   Substance and Sexual Activity    Alcohol use: Yes     Comment: 12 beers daily previously- as of 4/22/21 pt states occasional. As of 5-19-21 patient states he drinks a 6 pack of beer over a week or so.  Drug use: Yes     Types: Marijuana     Comment: daily    Sexual activity: Not on file   Other Topics Concern    Not on file   Social History Narrative    Not on file     Social Determinants of Health     Financial Resource Strain:     Difficulty of Paying Living Expenses:    Food Insecurity:     Worried About Running Out of Food in the Last Year:     [address] in the Last Year:    Transportation Needs:     Lack of Transportation (Medical):      Lack of Transportation (Non-Medical):    Physical Activity:     Days of Exercise per Week:     Minutes of Exercise per Session:    Stress:     Feeling of Stress :    Social Connections:     Frequency of Communication with Friends and Family:     Frequency of Social Gatherings with Friends and Family:     Attends [employer] Services:     Active Member of Clubs or Organizations:     Attends Club or Organization Meetings:     Marital Status:    Intimate Partner Violence:     Fear of Current or Ex-Partner:     Emotionally Abused:     Physically Abused:     Sexually Abused:        REVIEW OF SYSTEMS: A 12-point review of systemswas obtained and pertinent positives and negatives were enumerated above in the history of present illness. All other reviewed systems / symptoms were negative. Review of Systems   Constitutional: Positive for fatigue and unexpected weight change (decrease). Negative for appetite change. HENT: Positive for trouble swallowing. Eyes: Positive for visual disturbance (glasses). Respiratory: Positive for shortness of breath. Negative for cough, choking and wheezing. Cardiovascular: Positive for chest pain. Negative for palpitations and leg swelling. Gastrointestinal: Positive for abdominal pain, blood in stool, diarrhea, nausea and vomiting. Negative for abdominal distention, anal bleeding, constipation and rectal pain. Genitourinary: Negative for difficulty urinating. Allergic/Immunologic: Negative for environmental allergies and food allergies. Neurological: Positive for headaches. Negative for dizziness, weakness, light-headedness and numbness. Hematological: Bruises/bleeds easily. Psychiatric/Behavioral: Negative for sleep disturbance. The patient is nervous/anxious.             LABORATORY DATA: Reviewed  Lab Results   Component Value Date    WBC 2.5 (L) 06/09/2021    HGB 11.3 (L) 06/09/2021    HCT 33.1 (L) 06/09/2021    MCV 96.5 06/09/2021    PLT See Reflexed IPF Result 06/09/2021  06/09/2021    K 3.5 (L) 06/09/2021     06/09/2021    CO2 25 06/09/2021    BUN 3 (L) 06/09/2021    CREATININE 0.46 (L) 06/09/2021    LABALBU 4.0 06/09/2021    BILITOT 0.40 06/09/2021    ALKPHOS 99 06/09/2021    AST 29 06/09/2021    ALT 23 06/09/2021    INR 1.1 04/06/2021         Lab Results   Component Value Date    RBC 3.43 (L) 06/09/2021    HGB 11.3 (L) 06/09/2021    MCV 96.5 06/09/2021    MCH 32.9 06/09/2021    MCHC 34.1 06/09/2021    RDW 18.6 (H) 06/09/2021    MPV NOT REPORTED 06/09/2021    BASOPCT 0 06/09/2021    LYMPHSABS 0.28 (L) 06/09/2021    MONOSABS 0.30 06/09/2021    NEUTROABS 1.87 06/09/2021    EOSABS 0.05 06/09/2021    BASOSABS 0.00 06/09/2021         DIAGNOSTIC TESTING:     No results found. PHYSICAL EXAMINATION: Vital signs reviewed per the nursing documentation. /81   Pulse 126   Temp 97.2 °F (36.2 °C)   Wt 185 lb (83.9 kg)   BMI 25.09 kg/m²   Body mass index is 25.09 kg/m². Physical Exam  Vitals and nursing note reviewed. Constitutional:       General: He is not in acute distress. Appearance: He is well-developed. He is not diaphoretic. HENT:      Head: Normocephalic and atraumatic. Eyes:      General: No scleral icterus. Pupils: Pupils are equal, round, and reactive to light. Neck:      Thyroid: No thyromegaly. Vascular: No JVD. Trachea: No tracheal deviation. Cardiovascular:      Rate and Rhythm: Normal rate and regular rhythm. Heart sounds: Normal heart sounds. No murmur heard. Pulmonary:      Effort: Pulmonary effort is normal. No respiratory distress. Breath sounds: Normal breath sounds. No wheezing. Abdominal:      General: Bowel sounds are normal. There is no distension. Palpations: Abdomen is soft. There is no mass. Tenderness: There is no abdominal tenderness. There is no guarding or rebound. Musculoskeletal:         General: No tenderness. Normal range of motion.       Cervical back: Normal range of

## 2021-06-17 ENCOUNTER — OFFICE VISIT (OUTPATIENT)
Dept: GASTROENTEROLOGY | Age: 51
End: 2021-06-17
Payer: COMMERCIAL

## 2021-06-17 VITALS
SYSTOLIC BLOOD PRESSURE: 116 MMHG | WEIGHT: 185 LBS | DIASTOLIC BLOOD PRESSURE: 81 MMHG | BODY MASS INDEX: 25.09 KG/M2 | TEMPERATURE: 97.2 F | HEART RATE: 126 BPM

## 2021-06-17 DIAGNOSIS — K27.9 PEPTIC ULCER DISEASE: Primary | ICD-10-CM

## 2021-06-17 DIAGNOSIS — D61.818 PANCYTOPENIA (HCC): ICD-10-CM

## 2021-06-17 DIAGNOSIS — F10.10 ALCOHOL ABUSE: ICD-10-CM

## 2021-06-17 DIAGNOSIS — C32.9 LARYNGEAL CANCER (HCC): ICD-10-CM

## 2021-06-17 PROCEDURE — G8419 CALC BMI OUT NRM PARAM NOF/U: HCPCS | Performed by: INTERNAL MEDICINE

## 2021-06-17 PROCEDURE — 3017F COLORECTAL CA SCREEN DOC REV: CPT | Performed by: INTERNAL MEDICINE

## 2021-06-17 PROCEDURE — G8427 DOCREV CUR MEDS BY ELIG CLIN: HCPCS | Performed by: INTERNAL MEDICINE

## 2021-06-17 PROCEDURE — 4004F PT TOBACCO SCREEN RCVD TLK: CPT | Performed by: INTERNAL MEDICINE

## 2021-06-17 PROCEDURE — 99214 OFFICE O/P EST MOD 30 MIN: CPT | Performed by: INTERNAL MEDICINE

## 2021-06-17 ASSESSMENT — ENCOUNTER SYMPTOMS
COUGH: 0
ABDOMINAL PAIN: 1
BLOOD IN STOOL: 1
ANAL BLEEDING: 0
CHOKING: 0
WHEEZING: 0
RECTAL PAIN: 0
SHORTNESS OF BREATH: 1
ABDOMINAL DISTENTION: 0
CONSTIPATION: 0
DIARRHEA: 1
TROUBLE SWALLOWING: 1
VOMITING: 1
NAUSEA: 1

## 2021-07-14 ENCOUNTER — HOSPITAL ENCOUNTER (OUTPATIENT)
Dept: CT IMAGING | Age: 51
Discharge: HOME | End: 2021-07-16
Payer: COMMERCIAL

## 2021-07-14 DIAGNOSIS — C32.9 LARYNGEAL CANCER (HCC): ICD-10-CM

## 2021-07-14 PROCEDURE — 6360000004 HC RX CONTRAST MEDICATION: Performed by: INTERNAL MEDICINE

## 2021-07-14 PROCEDURE — 70491 CT SOFT TISSUE NECK W/DYE: CPT

## 2021-07-14 RX ADMIN — IOPAMIDOL 75 ML: 755 INJECTION, SOLUTION INTRAVENOUS at 09:25

## 2021-07-20 ENCOUNTER — TELEPHONE (OUTPATIENT)
Dept: GASTROENTEROLOGY | Age: 51
End: 2021-07-20

## 2021-07-20 NOTE — TELEPHONE ENCOUNTER
LVM for patient to call the office to reschedule 10/21/21. Has been cancelled due to provider conflict. Letter mailed.

## 2021-08-11 ENCOUNTER — TELEPHONE (OUTPATIENT)
Dept: ONCOLOGY | Age: 51
End: 2021-08-11

## 2021-08-11 NOTE — TELEPHONE ENCOUNTER
Patient was a no show to his appt on 8/11/2021 we did L/M to let him know and too call us to get rescheduled.

## 2021-09-29 ENCOUNTER — OFFICE VISIT (OUTPATIENT)
Dept: ONCOLOGY | Age: 51
End: 2021-09-29
Payer: COMMERCIAL

## 2021-09-29 VITALS
SYSTOLIC BLOOD PRESSURE: 118 MMHG | WEIGHT: 160 LBS | RESPIRATION RATE: 18 BRPM | HEART RATE: 93 BPM | TEMPERATURE: 96.7 F | BODY MASS INDEX: 21.7 KG/M2 | DIASTOLIC BLOOD PRESSURE: 85 MMHG

## 2021-09-29 DIAGNOSIS — C32.9 LARYNGEAL CANCER (HCC): Primary | ICD-10-CM

## 2021-09-29 PROCEDURE — 3017F COLORECTAL CA SCREEN DOC REV: CPT | Performed by: INTERNAL MEDICINE

## 2021-09-29 PROCEDURE — G8420 CALC BMI NORM PARAMETERS: HCPCS | Performed by: INTERNAL MEDICINE

## 2021-09-29 PROCEDURE — 99214 OFFICE O/P EST MOD 30 MIN: CPT | Performed by: INTERNAL MEDICINE

## 2021-09-29 PROCEDURE — 4004F PT TOBACCO SCREEN RCVD TLK: CPT | Performed by: INTERNAL MEDICINE

## 2021-09-29 PROCEDURE — G8427 DOCREV CUR MEDS BY ELIG CLIN: HCPCS | Performed by: INTERNAL MEDICINE

## 2021-09-29 NOTE — PROGRESS NOTES
Chief Complaint   Patient presents with    Follow-up     laryngeal cancer     Leg Swelling     Pa     DIAGNOSIS:       1.   Laryngeal squamous cell carcinoma, locally advanced with LN metastases, [id number]   2. Tobacco avuse  3. ETOH abuse  CURRENT THERAPY:         Plan for combined chemoradiation after completing staging work-up  Chemoradiation was started on 4/28/2021. Radiation will be completed June 16. Last chemotherapy treatment June 9, 2021  BRIEF CASE HISTORY:      Hung Polanco  is a 46 y.o.  male with history of alcohol abuse, depression, COPD, who is admitted to the hospital for SOB. 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 Patient seen by ENT    he had neck LN biopsy and results  Was positive for cancer involvement   The decision was to proceed with definitive chemoradiation. Considering his multiple comorbidities and poor nutritional status, the decision was to offer him weekly cisplatin with radiation. Chemoradiation was started on 4/28/2021  INTERIM HISTORY:   The patient comes in today for a follow-up. He received chemoradiation. Patient continues to have mild difficulty swallowing. Weight is stable. No nausea or vomiting. No chest pain or shortness of breath. No fever or infections. Patient has advanced bedbugs infestation        Past Medical History:   has a past medical history of Alcohol abuse, Anemia, Anxiety, COPD (chronic obstructive pulmonary disease) ([address] Depression, Gastritis, Head injury with loss of consciousness ([address] Headache, Hypertension, Laryngeal cancer ([address] Liver disease, Marijuana use, Migraine, NSVT (nonsustained ventricular tachycardia) ([address] Oropharyngeal dysphagia, PUD (peptic ulcer disease), Seizure disorder ([address] and Wernicke-Korsakoff psychosis ([address]     Past Surgical History:   has a past surgical history that includes Ankle surgery (age 10); Upper gastrointestinal endoscopy (N/A, 09/17/2019); US BIOPSY LYMPH NODE (03/16/2021); Endoscopy, colon, diagnostic; IR PORT PLACEMENT > 5 YEARS (04/06/2021); Ankle surgery (Right); and Upper gastrointestinal endoscopy (N/A, 5/19/2021). Medications:    Prior to Admission medications    Medication Sig Start Date End Date Taking? Authorizing Provider   Magic Mouthwash (MIRACLE MOUTHWASH) Mix 80 ml maalox, 80 ml benadryl and 40 ml 2%viscous xylocaine. 5-10 ml swish and swallow q 4 hours PRN 5/26/21  Yes Jose Sawyer MD   prochlorperazine (COMPAZINE) 10 MG tablet Take 1 tablet by mouth every 6 hours as needed (nausea) 4/27/21  Yes Jose Sawyer MD   ondansetron (ZOFRAN ODT) 8 MG TBDP disintegrating tablet Take 1 tablet by mouth every 8 hours as needed for Nausea 4/27/21  Yes Marian Garces MD   omeprazole (PRILOSEC) 20 MG delayed release capsule Take 1 capsule by mouth daily 4/8/21  Yes Cedrick Belcher MD   sucralfate (CARAFATE) 1 GM/10ML suspension Take 10 mLs by mouth 4 times daily 4/8/21  Yes Cedrick Belcher MD   lidocaine-prilocaine (EMLA) 2.5-2.5 % cream Apply topically to port site 60 minutes before access as needed. Patient not taking: Reported on 9/29/2021 4/27/21   Marian Garces MD   ibuprofen (IBU) 600 MG tablet Take 1 tablet by mouth every 6 hours as needed for Pain  Patient not taking: Reported on 3/31/2021 2/14/21 4/7/21  Erica Reyes DO   pantoprazole (PROTONIX) 40 MG tablet Take 1 tablet by mouth daily  Patient not taking: Reported on 3/31/2021 6/16/20 4/7/21  Lorin Mora MD     Current Outpatient Medications   Medication Sig Dispense Refill    Magic Mouthwash (MIRACLE MOUTHWASH) Mix 80 ml maalox, 80 ml benadryl and 40 ml 2%viscous xylocaine.  5-10 ml swish and swallow q 4 hours PRN 1 Bottle 2    prochlorperazine (COMPAZINE) 10 MG tablet Take 1 tablet by mouth every 6 hours as needed (nausea) 60 tablet 2    ondansetron (ZOFRAN ODT) 8 MG TBDP disintegrating tablet Take 1 tablet by mouth every 8 hours as needed for Nausea 30 tablet 2    omeprazole (PRILOSEC) 20 MG delayed release capsule Take 1 capsule by mouth daily 30 capsule 3    sucralfate (CARAFATE) 1 GM/10ML suspension Take 10 mLs by mouth 4 times daily 420 mL 3    lidocaine-prilocaine (EMLA) 2.5-2.5 % cream Apply topically to port site 60 minutes before access as needed. (Patient not taking: Reported on 9/29/2021) 1 Tube 3     No current facility-administered medications for this visit. Allergies:  Patient has no known allergies. Social History:   reports that he has been smoking. He has been smoking about 2.00 packs per day. He has never used smokeless tobacco. He reports current alcohol use. He reports current drug use. Drug: Marijuana. Family History: family history includes Colon Cancer in his sister; Diabetes in his mother; High Blood Pressure in his mother; Kidney Disease in his mother. REVIEW OF SYSTEMS:    Constitutional: No fever or chills. No night sweats, + weight loss   Eyes: No eye discharge, double vision, or eye pain   HEENT: negative for sore mouth, he has sore  throat and mild dysphagia. Increased phlegm  Respiratory: negative for cough , sputum, dyspnea, wheezing, hemoptysis, chest pain   Cardiovascular: negative for chest pain, dyspnea, palpitations, orthopnea, PND   Gastrointestinal: negative for nausea, vomiting, diarrhea, constipation, abdominal pain, Dysphagia, hematemesis and hematochezia   Genitourinary: negative for frequency, dysuria, nocturia, urinary incontinence, and hematuria   Integument: negative for rash, skin lesions, bruises.    Hematologic/Lymphatic: negative for easy bruising, bleeding, lymphadenopathy, or petechiae   Endocrine: negative for heat or cold intolerance,weight changes, change in bowel habits and hair loss   Musculoskeletal: negative for myalgias, arthralgias, pain, joint swelling,and bone pain   Neurological: negative for headaches, dizziness, seizures, weakness, numbness    PHYSICAL EXAM:      /85 (Site: Left Upper Arm, Position: Sitting, Cuff Size: Medium Adult)   Pulse 93   Temp 96.7 °F (35.9 °C) (Temporal)   Resp 18   Wt 160 lb (72.6 kg)   BMI 21.70 kg/m²    Temp (24hrs), Av.7 °F (37.1 °C), Min:98.5 °F (36.9 °C), Max:98.8 °F (37.1 °C)    General appearance -chronically ill appearing, no in pain or distress   Mental status - alert and cooperative   Eyes - pupils equal and reactive, extraocular eye movements intact   Ears - bilateral TM's and external ear canals normal   Mouth - mucous membranes moist, pharynx normal without lesions   Neck - supple, no significant adenopathy   Lymphatics - ++neck palpable lymphadenopathy, no hepatosplenomegaly   Chest - clear to auscultation, no wheezes, rales or rhonchi, symmetric air entry   Heart - normal rate, regular rhythm, normal S1, S2, no murmurs  Abdomen - soft, nontender, nondistended, no masses or organomegaly   Neurological - alert, oriented, normal speech, no focal findings or movement disorder noted   Musculoskeletal - no joint tenderness, deformity or swelling   Extremities - peripheral pulses normal, no pedal edema, no clubbing or cyanosis   Skin - normal coloration and turgor, no rashes, no suspicious skin lesions noted ,    DATA:    Labs:   CBC:   Lab Results   Component Value Date    WBC 2.5 (L) 2021    HGB 11.3 (L) 2021    HCT 33.1 (L) 2021    MCV 96.5 2021    PLT See Reflexed IPF Result 2021       Lab Results   Component Value Date     2021    K 3.5 (L) 2021     2021    CO2 25 2021    BUN 3 (L) 2021    CREATININE 0.46 (L) 2021    GLUCOSE 105 (H) 2021    CALCIUM 9.9 2021    PROT 7.7 2021    LABALBU 4.0 2021    BILITOT 0.40 2021    ALKPHOS 99 2021    AST 29 2021    ALT 23 2021    LABGLOM >60 2021    GFRAA >60 2021    GLOB NOT REPORTED 03/16/2021         IMAGING DATA:     Impression   1.  Area of significant FDG uptake along the left side of the larynx   measuring 3.2 x 2.2 cm consistent with known laryngeal carcinoma.  Moderate   uptake within both submandibular glands.       2.  Multiple lymph nodes with abnormal FDG uptake in the left anterior   triangle of the neck extending from the larynx distally to the thoracic inlet   consistent with neoplasia.       3.  Thickened gastric mucosa in the fundus of the stomach with mild FDG   uptake likely related to inflammation. IMPRESSION:   1. Laryngeal squamous cell carcinoma, locally advanced with LN metastases, [id number].   2. Tobacco avuse  3. ETOH abuse  4. Bedbugs infestation    RECOMMENDATIONS:  1. I reviewed the laboratory data, imaging studies, biopsy results, diagnosis and treatment recommendations. 2. The patient handled  recovered from chemoradiation with good results. 3. We will arrange for PET CT scan late October. 4. Discussed management of bedbugs and be contacted environmental services. 5. We talked about smoking cessation. He will try, refused the Chantix or the patches  6. Patient's questions were answered to the best of his satisfaction and he verbalized full understanding and agreement. [address] [place] [address] [facility]                            This note is created with the assistance of a speech recognition program.  While intending to generate a document that actually reflects the content of the visit, the document can still have some errors including those of syntax and sound a like substitutions which may escape proof reading. It such instances, actual meaning can be extrapolated by contextual diversion.

## 2021-11-17 ENCOUNTER — HOSPITAL ENCOUNTER (OUTPATIENT)
Dept: PET IMAGING | Age: 51
Discharge: HOME | End: 2021-11-19

## 2021-11-17 DIAGNOSIS — C32.9 LARYNGEAL CANCER (HCC): ICD-10-CM

## 2021-11-24 ENCOUNTER — TELEPHONE (OUTPATIENT)
Dept: GASTROENTEROLOGY | Age: 51
End: 2021-11-24

## 2021-11-24 PROBLEM — K92.2 GI BLEED: Status: ACTIVE | Noted: 2021-11-24

## 2021-11-24 NOTE — TELEPHONE ENCOUNTER
LVM that 2/1/22 has been cancelled due to provider conflict and moved to 8/28/36 at 9 am.  Letter mailed.

## 2022-01-28 ENCOUNTER — TELEPHONE (OUTPATIENT)
Dept: GASTROENTEROLOGY | Age: 52
End: 2022-01-28

## (undated) DEVICE — ENDO KIT W/SYRINGE: Brand: MEDLINE INDUSTRIES, INC.

## (undated) DEVICE — FORCEPS BX L240CM WRK CHN 2.8MM STD CAP W/ NDL MIC MESH

## (undated) DEVICE — BITEBLOCK 54FR W/ DENT RIM BLOX

## (undated) DEVICE — DEFENDO AIR WATER SUCTION AND BIOPSY VALVE KIT FOR  OLYMPUS: Brand: DEFENDO AIR/WATER/SUCTION AND BIOPSY VALVE